# Patient Record
Sex: MALE | Race: BLACK OR AFRICAN AMERICAN | NOT HISPANIC OR LATINO
[De-identification: names, ages, dates, MRNs, and addresses within clinical notes are randomized per-mention and may not be internally consistent; named-entity substitution may affect disease eponyms.]

---

## 2016-11-25 RX ORDER — MYCOPHENOLATE MOFETIL 250 MG/1
1 CAPSULE ORAL
Qty: 42 | Refills: 0 | COMMUNITY
Start: 2016-11-25 | End: 2016-12-09

## 2017-01-02 ENCOUNTER — TRANSCRIPTION ENCOUNTER (OUTPATIENT)
Age: 27
End: 2017-01-02

## 2017-01-03 ENCOUNTER — APPOINTMENT (OUTPATIENT)
Dept: DERMATOLOGY | Facility: CLINIC | Age: 27
End: 2017-01-03

## 2017-01-03 VITALS — SYSTOLIC BLOOD PRESSURE: 120 MMHG | DIASTOLIC BLOOD PRESSURE: 70 MMHG

## 2017-01-03 DIAGNOSIS — L70.0 ACNE VULGARIS: ICD-10-CM

## 2017-01-10 ENCOUNTER — APPOINTMENT (OUTPATIENT)
Dept: ULTRASOUND IMAGING | Facility: HOSPITAL | Age: 27
End: 2017-01-10

## 2017-01-16 ENCOUNTER — FORM ENCOUNTER (OUTPATIENT)
Age: 27
End: 2017-01-16

## 2017-01-16 ENCOUNTER — RESULT REVIEW (OUTPATIENT)
Age: 27
End: 2017-01-16

## 2017-01-17 ENCOUNTER — OUTPATIENT (OUTPATIENT)
Dept: OUTPATIENT SERVICES | Facility: HOSPITAL | Age: 27
LOS: 1 days | End: 2017-01-17
Payer: COMMERCIAL

## 2017-01-17 ENCOUNTER — APPOINTMENT (OUTPATIENT)
Dept: ULTRASOUND IMAGING | Facility: HOSPITAL | Age: 27
End: 2017-01-17

## 2017-01-17 DIAGNOSIS — M32.9 SYSTEMIC LUPUS ERYTHEMATOSUS, UNSPECIFIED: ICD-10-CM

## 2017-01-17 LAB
APTT BLD: 24.9 SEC — LOW (ref 27.5–37.4)
INR BLD: 1.06 RATIO — SIGNIFICANT CHANGE UP (ref 0.88–1.16)
PROTHROM AB SERPL-ACNC: 11.5 SEC — SIGNIFICANT CHANGE UP (ref 10–13.1)

## 2017-01-17 PROCEDURE — 88348 ELECTRON MICROSCOPY DX: CPT

## 2017-01-17 PROCEDURE — 88312 SPECIAL STAINS GROUP 1: CPT

## 2017-01-17 PROCEDURE — 88346 IMFLUOR 1ST 1ANTB STAIN PX: CPT

## 2017-01-17 PROCEDURE — 88313 SPECIAL STAINS GROUP 2: CPT

## 2017-01-17 PROCEDURE — 88305 TISSUE EXAM BY PATHOLOGIST: CPT

## 2017-01-17 PROCEDURE — 85730 THROMBOPLASTIN TIME PARTIAL: CPT

## 2017-01-17 PROCEDURE — 76942 ECHO GUIDE FOR BIOPSY: CPT

## 2017-01-17 PROCEDURE — 85610 PROTHROMBIN TIME: CPT

## 2017-01-17 PROCEDURE — 88350 IMFLUOR EA ADDL 1ANTB STN PX: CPT

## 2017-01-17 PROCEDURE — 50200 RENAL BIOPSY PERQ: CPT

## 2017-01-24 ENCOUNTER — APPOINTMENT (OUTPATIENT)
Dept: RHEUMATOLOGY | Facility: CLINIC | Age: 27
End: 2017-01-24

## 2017-01-24 VITALS
WEIGHT: 154 LBS | SYSTOLIC BLOOD PRESSURE: 127 MMHG | HEIGHT: 68 IN | RESPIRATION RATE: 14 BRPM | OXYGEN SATURATION: 97 % | DIASTOLIC BLOOD PRESSURE: 86 MMHG | BODY MASS INDEX: 23.34 KG/M2 | HEART RATE: 99 BPM

## 2017-01-25 VITALS — SYSTOLIC BLOOD PRESSURE: 110 MMHG | DIASTOLIC BLOOD PRESSURE: 70 MMHG

## 2017-01-25 LAB
ALBUMIN SERPL ELPH-MCNC: 3.1 G/DL
ALP BLD-CCNC: 65 U/L
ALT SERPL-CCNC: 26 U/L
ANION GAP SERPL CALC-SCNC: 12 MMOL/L
APPEARANCE: CLEAR
AST SERPL-CCNC: 20 U/L
BACTERIA: NEGATIVE
BASOPHILS # BLD AUTO: 0.01 K/UL
BASOPHILS NFR BLD AUTO: 0.1 %
BILIRUB SERPL-MCNC: 0.3 MG/DL
BILIRUBIN URINE: NEGATIVE
BLOOD URINE: ABNORMAL
BUN SERPL-MCNC: 15 MG/DL
C3 SERPL-MCNC: 66 MG/DL
C4 SERPL-MCNC: 11 MG/DL
CALCIUM SERPL-MCNC: 8.8 MG/DL
CHLORIDE SERPL-SCNC: 103 MMOL/L
CK SERPL-CCNC: 87 U/L
CO2 SERPL-SCNC: 27 MMOL/L
COLOR: YELLOW
CREAT SERPL-MCNC: 1.11 MG/DL
CREAT SPEC-SCNC: 152 MG/DL
CREAT/PROT UR: 1.2 RATIO
DSDNA AB SER-ACNC: 220 IU/ML
ENA RNP AB SER IA-ACNC: 4.1 AL
ENA SM AB SER IA-ACNC: >8 AL
ENA SS-A AB SER IA-ACNC: 7.3 AL
ENA SS-B AB SER IA-ACNC: <0.2 AL
EOSINOPHIL # BLD AUTO: 0.01 K/UL
EOSINOPHIL NFR BLD AUTO: 0.1 %
GLUCOSE QUALITATIVE U: NORMAL MG/DL
GLUCOSE SERPL-MCNC: 112 MG/DL
HCT VFR BLD CALC: 46.5 %
HGB BLD-MCNC: 14.5 G/DL
HYALINE CASTS: 0 /LPF
IMM GRANULOCYTES NFR BLD AUTO: 0.4 %
INR PPP: 1.98 RATIO
KETONES URINE: NEGATIVE
LEUKOCYTE ESTERASE URINE: NEGATIVE
LYMPHOCYTES # BLD AUTO: 3.04 K/UL
LYMPHOCYTES NFR BLD AUTO: 18.7 %
MAN DIFF?: NORMAL
MCHC RBC-ENTMCNC: 30.3 PG
MCHC RBC-ENTMCNC: 31.2 GM/DL
MCV RBC AUTO: 97.3 FL
MICROSCOPIC-UA: NORMAL
MONOCYTES # BLD AUTO: 0.92 K/UL
MONOCYTES NFR BLD AUTO: 5.7 %
NEUTROPHILS # BLD AUTO: 12.2 K/UL
NEUTROPHILS NFR BLD AUTO: 75 %
NITRITE URINE: NEGATIVE
PH URINE: 6.5
PLATELET # BLD AUTO: 245 K/UL
POTASSIUM SERPL-SCNC: 4.1 MMOL/L
PROT SERPL-MCNC: 5.5 G/DL
PROT UR-MCNC: 186 MG/DL
PROTEIN URINE: 300 MG/DL
PT BLD: 22.5 SEC
RBC # BLD: 4.78 M/UL
RBC # FLD: 14.5 %
RED BLOOD CELLS URINE: 10 /HPF
SODIUM SERPL-SCNC: 142 MMOL/L
SPECIFIC GRAVITY URINE: 1.03
SQUAMOUS EPITHELIAL CELLS: 3 /HPF
UROBILINOGEN URINE: NORMAL MG/DL
WBC # FLD AUTO: 16.25 K/UL
WHITE BLOOD CELLS URINE: 34 /HPF

## 2017-01-26 LAB — SURGICAL PATHOLOGY STUDY: SIGNIFICANT CHANGE UP

## 2017-01-27 ENCOUNTER — APPOINTMENT (OUTPATIENT)
Dept: ELECTROPHYSIOLOGY | Facility: CLINIC | Age: 27
End: 2017-01-27

## 2017-01-27 VITALS
HEART RATE: 83 BPM | BODY MASS INDEX: 24.25 KG/M2 | WEIGHT: 160 LBS | DIASTOLIC BLOOD PRESSURE: 88 MMHG | HEIGHT: 68 IN | SYSTOLIC BLOOD PRESSURE: 134 MMHG | OXYGEN SATURATION: 97 %

## 2017-01-29 ENCOUNTER — MEDICATION RENEWAL (OUTPATIENT)
Age: 27
End: 2017-01-29

## 2017-01-29 LAB
ANA PAT FLD IF-IMP: NORMAL
ANA SER IF-ACNC: ABNORMAL

## 2017-01-31 ENCOUNTER — MEDICATION RENEWAL (OUTPATIENT)
Age: 27
End: 2017-01-31

## 2017-02-06 ENCOUNTER — APPOINTMENT (OUTPATIENT)
Dept: RHEUMATOLOGY | Facility: CLINIC | Age: 27
End: 2017-02-06

## 2017-02-06 ENCOUNTER — APPOINTMENT (OUTPATIENT)
Dept: CV DIAGNOSITCS | Facility: HOSPITAL | Age: 27
End: 2017-02-06

## 2017-02-06 ENCOUNTER — OUTPATIENT (OUTPATIENT)
Dept: OUTPATIENT SERVICES | Facility: HOSPITAL | Age: 27
LOS: 1 days | End: 2017-02-06
Payer: COMMERCIAL

## 2017-02-06 DIAGNOSIS — L95.9 VASCULITIS LIMITED TO THE SKIN, UNSPECIFIED: ICD-10-CM

## 2017-02-06 DIAGNOSIS — I42.9 CARDIOMYOPATHY, UNSPECIFIED: ICD-10-CM

## 2017-02-06 PROCEDURE — 93306 TTE W/DOPPLER COMPLETE: CPT

## 2017-02-06 PROCEDURE — 93306 TTE W/DOPPLER COMPLETE: CPT | Mod: 26

## 2017-02-07 ENCOUNTER — APPOINTMENT (OUTPATIENT)
Dept: RHEUMATOLOGY | Facility: CLINIC | Age: 27
End: 2017-02-07

## 2017-02-07 LAB
BASOPHILS # BLD AUTO: 0.01 K/UL
BASOPHILS NFR BLD AUTO: 0.1 %
CK SERPL-CCNC: 307 U/L
DSDNA AB SER-ACNC: 112 IU/ML
EOSINOPHIL # BLD AUTO: 0 K/UL
EOSINOPHIL NFR BLD AUTO: 0 %
HCT VFR BLD CALC: 45.5 %
HGB BLD-MCNC: 13.7 G/DL
IMM GRANULOCYTES NFR BLD AUTO: 0.6 %
LYMPHOCYTES # BLD AUTO: 1.63 K/UL
LYMPHOCYTES NFR BLD AUTO: 8.4 %
MAN DIFF?: NORMAL
MCHC RBC-ENTMCNC: 29.7 PG
MCHC RBC-ENTMCNC: 30.1 GM/DL
MCV RBC AUTO: 98.5 FL
MONOCYTES # BLD AUTO: 1.08 K/UL
MONOCYTES NFR BLD AUTO: 5.6 %
NEUTROPHILS # BLD AUTO: 16.53 K/UL
NEUTROPHILS NFR BLD AUTO: 85.3 %
PLATELET # BLD AUTO: 269 K/UL
RBC # BLD: 4.62 M/UL
RBC # FLD: 14.3 %
WBC # FLD AUTO: 19.36 K/UL

## 2017-02-08 LAB
APPEARANCE: ABNORMAL
BACTERIA: ABNORMAL
BILIRUBIN URINE: NEGATIVE
BLOOD URINE: ABNORMAL
C3 SERPL-MCNC: 58 MG/DL
C4 SERPL-MCNC: 11 MG/DL
CALCIUM OXALATE CRYSTALS: NEGATIVE
COLOR: YELLOW
GLUCOSE QUALITATIVE U: NORMAL MG/DL
GRANULAR CASTS: 0 /LPF
HYALINE CASTS: 0 /LPF
KETONES URINE: NEGATIVE
LEUKOCYTE ESTERASE URINE: NEGATIVE
MICROSCOPIC-UA: NORMAL
NITRITE URINE: NEGATIVE
PH URINE: 5.5
PROTEIN URINE: 300 MG/DL
RED BLOOD CELLS URINE: 15 /HPF
SPECIFIC GRAVITY URINE: 1.02
SQUAMOUS EPITHELIAL CELLS: 3 /HPF
TRIPLE PHOSPHATE CRYSTALS: NEGATIVE
URIC ACID CRYSTALS: NEGATIVE
UROBILINOGEN URINE: NORMAL MG/DL
WHITE BLOOD CELLS URINE: 10 /HPF

## 2017-03-06 ENCOUNTER — APPOINTMENT (OUTPATIENT)
Dept: RHEUMATOLOGY | Facility: CLINIC | Age: 27
End: 2017-03-06

## 2017-03-06 LAB
ALBUMIN SERPL ELPH-MCNC: 3.3 G/DL
ALP BLD-CCNC: 60 U/L
ALT SERPL-CCNC: 33 U/L
ANION GAP SERPL CALC-SCNC: 18 MMOL/L
AST SERPL-CCNC: 31 U/L
BILIRUB SERPL-MCNC: 0.3 MG/DL
BUN SERPL-MCNC: 21 MG/DL
CALCIUM SERPL-MCNC: 8.7 MG/DL
CHLORIDE SERPL-SCNC: 100 MMOL/L
CO2 SERPL-SCNC: 23 MMOL/L
CREAT SERPL-MCNC: 0.8 MG/DL
CREAT SPEC-SCNC: 122 MG/DL
CREAT/PROT UR: 2.2 RATIO
GLUCOSE SERPL-MCNC: 26 MG/DL
POTASSIUM SERPL-SCNC: 4.2 MMOL/L
PROT SERPL-MCNC: 5.3 G/DL
PROT UR-MCNC: 265 MG/DL
SODIUM SERPL-SCNC: 141 MMOL/L

## 2017-03-20 ENCOUNTER — LABORATORY RESULT (OUTPATIENT)
Age: 27
End: 2017-03-20

## 2017-03-20 ENCOUNTER — APPOINTMENT (OUTPATIENT)
Dept: RHEUMATOLOGY | Facility: CLINIC | Age: 27
End: 2017-03-20

## 2017-03-27 ENCOUNTER — MEDICATION RENEWAL (OUTPATIENT)
Age: 27
End: 2017-03-27

## 2017-03-31 ENCOUNTER — APPOINTMENT (OUTPATIENT)
Dept: CARDIOLOGY | Facility: CLINIC | Age: 27
End: 2017-03-31

## 2017-03-31 VITALS
RESPIRATION RATE: 16 BRPM | HEIGHT: 68 IN | DIASTOLIC BLOOD PRESSURE: 81 MMHG | HEART RATE: 89 BPM | BODY MASS INDEX: 25.61 KG/M2 | SYSTOLIC BLOOD PRESSURE: 130 MMHG | WEIGHT: 169 LBS | OXYGEN SATURATION: 98 %

## 2017-03-31 DIAGNOSIS — Z87.39 PERSONAL HISTORY OF OTHER DISEASES OF THE MUSCULOSKELETAL SYSTEM AND CONNECTIVE TISSUE: ICD-10-CM

## 2017-03-31 DIAGNOSIS — N30.01 ACUTE CYSTITIS WITH HEMATURIA: ICD-10-CM

## 2017-03-31 DIAGNOSIS — I82.409 ACUTE EMBOLISM AND THROMBOSIS OF UNSPECIFIED DEEP VEINS OF UNSPECIFIED LOWER EXTREMITY: ICD-10-CM

## 2017-03-31 DIAGNOSIS — Z86.711 PERSONAL HISTORY OF PULMONARY EMBOLISM: ICD-10-CM

## 2017-03-31 DIAGNOSIS — Z87.898 PERSONAL HISTORY OF OTHER SPECIFIED CONDITIONS: ICD-10-CM

## 2017-03-31 DIAGNOSIS — Z86.718 PERSONAL HISTORY OF OTHER VENOUS THROMBOSIS AND EMBOLISM: ICD-10-CM

## 2017-03-31 DIAGNOSIS — Z86.79 PERSONAL HISTORY OF OTHER DISEASES OF THE CIRCULATORY SYSTEM: ICD-10-CM

## 2017-04-03 ENCOUNTER — APPOINTMENT (OUTPATIENT)
Dept: RHEUMATOLOGY | Facility: CLINIC | Age: 27
End: 2017-04-03

## 2017-04-03 PROBLEM — N30.01 ACUTE CYSTITIS WITH HEMATURIA: Status: RESOLVED | Noted: 2017-03-25 | Resolved: 2017-04-03

## 2017-05-01 ENCOUNTER — APPOINTMENT (OUTPATIENT)
Dept: RHEUMATOLOGY | Facility: CLINIC | Age: 27
End: 2017-05-01

## 2017-05-03 ENCOUNTER — APPOINTMENT (OUTPATIENT)
Dept: CARDIOLOGY | Facility: CLINIC | Age: 27
End: 2017-05-03

## 2017-05-03 VITALS
WEIGHT: 186 LBS | DIASTOLIC BLOOD PRESSURE: 60 MMHG | BODY MASS INDEX: 28.19 KG/M2 | HEIGHT: 68 IN | OXYGEN SATURATION: 99 % | SYSTOLIC BLOOD PRESSURE: 94 MMHG | RESPIRATION RATE: 17 BRPM | HEART RATE: 99 BPM

## 2017-05-03 RX ORDER — CLINDAMYCIN PHOSPHATE 10 MG/ML
1 LOTION TOPICAL TWICE DAILY
Qty: 1 | Refills: 3 | Status: DISCONTINUED | COMMUNITY
Start: 2017-01-03 | End: 2017-05-03

## 2017-05-03 RX ORDER — CIPROFLOXACIN HYDROCHLORIDE 500 MG/1
500 TABLET, FILM COATED ORAL
Qty: 5 | Refills: 1 | Status: DISCONTINUED | COMMUNITY
Start: 2017-03-25 | End: 2017-05-03

## 2017-05-30 ENCOUNTER — APPOINTMENT (OUTPATIENT)
Dept: RHEUMATOLOGY | Facility: CLINIC | Age: 27
End: 2017-05-30

## 2017-05-30 DIAGNOSIS — B35.9 DERMATOPHYTOSIS, UNSPECIFIED: ICD-10-CM

## 2017-06-13 ENCOUNTER — RX RENEWAL (OUTPATIENT)
Age: 27
End: 2017-06-13

## 2017-06-30 ENCOUNTER — APPOINTMENT (OUTPATIENT)
Dept: RHEUMATOLOGY | Facility: CLINIC | Age: 27
End: 2017-06-30

## 2017-06-30 LAB — TSH SERPL-ACNC: 2.02 UIU/ML

## 2017-07-24 ENCOUNTER — APPOINTMENT (OUTPATIENT)
Dept: RHEUMATOLOGY | Facility: CLINIC | Age: 27
End: 2017-07-24

## 2017-08-02 ENCOUNTER — APPOINTMENT (OUTPATIENT)
Dept: CARDIOLOGY | Facility: CLINIC | Age: 27
End: 2017-08-02
Payer: COMMERCIAL

## 2017-08-02 VITALS
SYSTOLIC BLOOD PRESSURE: 120 MMHG | BODY MASS INDEX: 30.01 KG/M2 | HEIGHT: 68 IN | HEART RATE: 81 BPM | OXYGEN SATURATION: 98 % | DIASTOLIC BLOOD PRESSURE: 78 MMHG | RESPIRATION RATE: 18 BRPM | WEIGHT: 198 LBS

## 2017-08-02 PROCEDURE — 93000 ELECTROCARDIOGRAM COMPLETE: CPT

## 2017-08-02 PROCEDURE — 99215 OFFICE O/P EST HI 40 MIN: CPT

## 2017-08-22 ENCOUNTER — MEDICATION RENEWAL (OUTPATIENT)
Age: 27
End: 2017-08-22

## 2017-08-22 ENCOUNTER — APPOINTMENT (OUTPATIENT)
Dept: RHEUMATOLOGY | Facility: CLINIC | Age: 27
End: 2017-08-22

## 2017-09-18 ENCOUNTER — APPOINTMENT (OUTPATIENT)
Dept: RHEUMATOLOGY | Facility: CLINIC | Age: 27
End: 2017-09-18

## 2017-09-21 ENCOUNTER — LABORATORY RESULT (OUTPATIENT)
Age: 27
End: 2017-09-21

## 2017-09-21 ENCOUNTER — APPOINTMENT (OUTPATIENT)
Dept: RHEUMATOLOGY | Facility: CLINIC | Age: 27
End: 2017-09-21

## 2017-10-07 ENCOUNTER — RX RENEWAL (OUTPATIENT)
Age: 27
End: 2017-10-07

## 2017-10-17 ENCOUNTER — APPOINTMENT (OUTPATIENT)
Dept: RHEUMATOLOGY | Facility: CLINIC | Age: 27
End: 2017-10-17

## 2017-11-13 ENCOUNTER — APPOINTMENT (OUTPATIENT)
Dept: RHEUMATOLOGY | Facility: CLINIC | Age: 27
End: 2017-11-13

## 2017-11-17 ENCOUNTER — OTHER (OUTPATIENT)
Age: 27
End: 2017-11-17

## 2017-11-26 ENCOUNTER — INPATIENT (INPATIENT)
Facility: HOSPITAL | Age: 27
LOS: 2 days | Discharge: HOME | DRG: 394 | End: 2017-11-29
Attending: HOSPITALIST | Admitting: EMERGENCY MEDICINE
Payer: COMMERCIAL

## 2017-11-26 VITALS
HEIGHT: 68.5 IN | OXYGEN SATURATION: 95 % | RESPIRATION RATE: 16 BRPM | WEIGHT: 179.9 LBS | TEMPERATURE: 99 F | HEART RATE: 93 BPM | DIASTOLIC BLOOD PRESSURE: 77 MMHG | SYSTOLIC BLOOD PRESSURE: 117 MMHG

## 2017-11-26 DIAGNOSIS — I50.9 HEART FAILURE, UNSPECIFIED: ICD-10-CM

## 2017-11-26 DIAGNOSIS — K52.9 NONINFECTIVE GASTROENTERITIS AND COLITIS, UNSPECIFIED: ICD-10-CM

## 2017-11-26 DIAGNOSIS — R30.0 DYSURIA: ICD-10-CM

## 2017-11-26 DIAGNOSIS — I82.409 ACUTE EMBOLISM AND THROMBOSIS OF UNSPECIFIED DEEP VEINS OF UNSPECIFIED LOWER EXTREMITY: ICD-10-CM

## 2017-11-26 DIAGNOSIS — M32.14 GLOMERULAR DISEASE IN SYSTEMIC LUPUS ERYTHEMATOSUS: ICD-10-CM

## 2017-11-26 DIAGNOSIS — M32.9 SYSTEMIC LUPUS ERYTHEMATOSUS, UNSPECIFIED: ICD-10-CM

## 2017-11-26 DIAGNOSIS — Z29.9 ENCOUNTER FOR PROPHYLACTIC MEASURES, UNSPECIFIED: ICD-10-CM

## 2017-11-26 LAB
ALBUMIN SERPL ELPH-MCNC: 3.9 G/DL — SIGNIFICANT CHANGE UP (ref 3.3–5)
ALP SERPL-CCNC: 71 U/L — SIGNIFICANT CHANGE UP (ref 40–120)
ALT FLD-CCNC: 8 U/L RC — LOW (ref 10–45)
ANION GAP SERPL CALC-SCNC: 14 MMOL/L — SIGNIFICANT CHANGE UP (ref 5–17)
APPEARANCE UR: CLEAR — SIGNIFICANT CHANGE UP
APTT BLD: 35.5 SEC — SIGNIFICANT CHANGE UP (ref 27.5–37.4)
AST SERPL-CCNC: 19 U/L — SIGNIFICANT CHANGE UP (ref 10–40)
BACTERIA # UR AUTO: ABNORMAL /HPF
BASE EXCESS BLDV CALC-SCNC: 1.8 MMOL/L — SIGNIFICANT CHANGE UP (ref -2–2)
BASOPHILS # BLD AUTO: 0.1 K/UL — SIGNIFICANT CHANGE UP (ref 0–0.2)
BASOPHILS NFR BLD AUTO: 0.6 % — SIGNIFICANT CHANGE UP (ref 0–2)
BILIRUB SERPL-MCNC: 0.7 MG/DL — SIGNIFICANT CHANGE UP (ref 0.2–1.2)
BILIRUB UR-MCNC: NEGATIVE — SIGNIFICANT CHANGE UP
BUN SERPL-MCNC: 12 MG/DL — SIGNIFICANT CHANGE UP (ref 7–23)
CA-I SERPL-SCNC: 1.18 MMOL/L — SIGNIFICANT CHANGE UP (ref 1.12–1.3)
CALCIUM SERPL-MCNC: 9.2 MG/DL — SIGNIFICANT CHANGE UP (ref 8.4–10.5)
CHLORIDE BLDV-SCNC: 106 MMOL/L — SIGNIFICANT CHANGE UP (ref 96–108)
CHLORIDE SERPL-SCNC: 102 MMOL/L — SIGNIFICANT CHANGE UP (ref 96–108)
CO2 BLDV-SCNC: 28 MMOL/L — SIGNIFICANT CHANGE UP (ref 22–30)
CO2 SERPL-SCNC: 25 MMOL/L — SIGNIFICANT CHANGE UP (ref 22–31)
COLOR SPEC: YELLOW — SIGNIFICANT CHANGE UP
CREAT SERPL-MCNC: 1.06 MG/DL — SIGNIFICANT CHANGE UP (ref 0.5–1.3)
DIFF PNL FLD: NEGATIVE — SIGNIFICANT CHANGE UP
EOSINOPHIL # BLD AUTO: 0.1 K/UL — SIGNIFICANT CHANGE UP (ref 0–0.5)
EOSINOPHIL NFR BLD AUTO: 0.5 % — SIGNIFICANT CHANGE UP (ref 0–6)
GAS PNL BLDV: 137 MMOL/L — SIGNIFICANT CHANGE UP (ref 136–145)
GAS PNL BLDV: SIGNIFICANT CHANGE UP
GLUCOSE BLDV-MCNC: 89 MG/DL — SIGNIFICANT CHANGE UP (ref 70–99)
GLUCOSE SERPL-MCNC: 93 MG/DL — SIGNIFICANT CHANGE UP (ref 70–99)
GLUCOSE UR QL: NEGATIVE — SIGNIFICANT CHANGE UP
HCO3 BLDV-SCNC: 27 MMOL/L — SIGNIFICANT CHANGE UP (ref 21–29)
HCT VFR BLD CALC: 37.3 % — LOW (ref 39–50)
HCT VFR BLDA CALC: 38 % — LOW (ref 39–50)
HGB BLD CALC-MCNC: 12.5 G/DL — LOW (ref 13–17)
HGB BLD-MCNC: 11.8 G/DL — LOW (ref 13–17)
INR BLD: 1.49 RATIO — HIGH (ref 0.88–1.16)
KETONES UR-MCNC: NEGATIVE — SIGNIFICANT CHANGE UP
LACTATE BLDV-MCNC: 1.6 MMOL/L — SIGNIFICANT CHANGE UP (ref 0.7–2)
LEUKOCYTE ESTERASE UR-ACNC: NEGATIVE — SIGNIFICANT CHANGE UP
LIDOCAIN IGE QN: 31 U/L — SIGNIFICANT CHANGE UP (ref 7–60)
LYMPHOCYTES # BLD AUTO: 2.2 K/UL — SIGNIFICANT CHANGE UP (ref 1–3.3)
LYMPHOCYTES # BLD AUTO: 21.9 % — SIGNIFICANT CHANGE UP (ref 13–44)
MCHC RBC-ENTMCNC: 28.1 PG — SIGNIFICANT CHANGE UP (ref 27–34)
MCHC RBC-ENTMCNC: 31.6 GM/DL — LOW (ref 32–36)
MCV RBC AUTO: 88.9 FL — SIGNIFICANT CHANGE UP (ref 80–100)
MONOCYTES # BLD AUTO: 0.9 K/UL — SIGNIFICANT CHANGE UP (ref 0–0.9)
MONOCYTES NFR BLD AUTO: 8.6 % — SIGNIFICANT CHANGE UP (ref 2–14)
NEUTROPHILS # BLD AUTO: 6.8 K/UL — SIGNIFICANT CHANGE UP (ref 1.8–7.4)
NEUTROPHILS NFR BLD AUTO: 68.4 % — SIGNIFICANT CHANGE UP (ref 43–77)
NITRITE UR-MCNC: NEGATIVE — SIGNIFICANT CHANGE UP
OTHER CELLS CSF MANUAL: 13 ML/DL — LOW (ref 18–22)
PCO2 BLDV: 47 MMHG — SIGNIFICANT CHANGE UP (ref 35–50)
PH BLDV: 7.38 — SIGNIFICANT CHANGE UP (ref 7.35–7.45)
PH UR: 6 — SIGNIFICANT CHANGE UP (ref 5–8)
PLAT MORPH BLD: NORMAL — SIGNIFICANT CHANGE UP
PLATELET # BLD AUTO: 281 K/UL — SIGNIFICANT CHANGE UP (ref 150–400)
PO2 BLDV: 45 MMHG — SIGNIFICANT CHANGE UP (ref 25–45)
POTASSIUM BLDV-SCNC: 4 MMOL/L — SIGNIFICANT CHANGE UP (ref 3.5–5)
POTASSIUM SERPL-MCNC: 3.7 MMOL/L — SIGNIFICANT CHANGE UP (ref 3.5–5.3)
POTASSIUM SERPL-SCNC: 3.7 MMOL/L — SIGNIFICANT CHANGE UP (ref 3.5–5.3)
PROT SERPL-MCNC: 6.9 G/DL — SIGNIFICANT CHANGE UP (ref 6–8.3)
PROT UR-MCNC: 30 MG/DL
PROTHROM AB SERPL-ACNC: 16.4 SEC — HIGH (ref 9.8–12.7)
RBC # BLD: 4.2 M/UL — SIGNIFICANT CHANGE UP (ref 4.2–5.8)
RBC # FLD: 12.4 % — SIGNIFICANT CHANGE UP (ref 10.3–14.5)
RBC BLD AUTO: SIGNIFICANT CHANGE UP
RBC CASTS # UR COMP ASSIST: SIGNIFICANT CHANGE UP /HPF (ref 0–2)
SAO2 % BLDV: 76 % — SIGNIFICANT CHANGE UP (ref 67–88)
SODIUM SERPL-SCNC: 141 MMOL/L — SIGNIFICANT CHANGE UP (ref 135–145)
SP GR SPEC: 1.02 — SIGNIFICANT CHANGE UP (ref 1.01–1.02)
UROBILINOGEN FLD QL: NEGATIVE — SIGNIFICANT CHANGE UP
WBC # BLD: 11.1 K/UL — HIGH (ref 3.8–10.5)
WBC # FLD AUTO: 11.1 K/UL — HIGH (ref 3.8–10.5)
WBC UR QL: SIGNIFICANT CHANGE UP /HPF (ref 0–5)

## 2017-11-26 PROCEDURE — 99285 EMERGENCY DEPT VISIT HI MDM: CPT

## 2017-11-26 PROCEDURE — 74177 CT ABD & PELVIS W/CONTRAST: CPT | Mod: 26

## 2017-11-26 PROCEDURE — 99282 EMERGENCY DEPT VISIT SF MDM: CPT

## 2017-11-26 PROCEDURE — 99223 1ST HOSP IP/OBS HIGH 75: CPT | Mod: GC

## 2017-11-26 PROCEDURE — 71020: CPT | Mod: 26

## 2017-11-26 RX ORDER — CEFEPIME 1 G/1
2000 INJECTION, POWDER, FOR SOLUTION INTRAMUSCULAR; INTRAVENOUS ONCE
Qty: 0 | Refills: 0 | Status: COMPLETED | OUTPATIENT
Start: 2017-11-26 | End: 2017-11-26

## 2017-11-26 RX ORDER — SODIUM CHLORIDE 9 MG/ML
1000 INJECTION INTRAMUSCULAR; INTRAVENOUS; SUBCUTANEOUS ONCE
Qty: 0 | Refills: 0 | Status: COMPLETED | OUTPATIENT
Start: 2017-11-26 | End: 2017-11-26

## 2017-11-26 RX ORDER — LISINOPRIL 2.5 MG/1
10 TABLET ORAL DAILY
Qty: 0 | Refills: 0 | Status: DISCONTINUED | OUTPATIENT
Start: 2017-11-26 | End: 2017-11-29

## 2017-11-26 RX ORDER — ACETAMINOPHEN 500 MG
1000 TABLET ORAL ONCE
Qty: 0 | Refills: 0 | Status: COMPLETED | OUTPATIENT
Start: 2017-11-26 | End: 2017-11-26

## 2017-11-26 RX ORDER — HYDROXYCHLOROQUINE SULFATE 200 MG
200 TABLET ORAL EVERY 12 HOURS
Qty: 0 | Refills: 0 | Status: DISCONTINUED | OUTPATIENT
Start: 2017-11-26 | End: 2017-11-29

## 2017-11-26 RX ORDER — WARFARIN SODIUM 2.5 MG/1
7 TABLET ORAL ONCE
Qty: 0 | Refills: 0 | Status: COMPLETED | OUTPATIENT
Start: 2017-11-26 | End: 2017-11-26

## 2017-11-26 RX ORDER — WARFARIN SODIUM 2.5 MG/1
6 TABLET ORAL ONCE
Qty: 0 | Refills: 0 | Status: DISCONTINUED | OUTPATIENT
Start: 2017-11-26 | End: 2017-11-26

## 2017-11-26 RX ORDER — CARVEDILOL PHOSPHATE 80 MG/1
25 CAPSULE, EXTENDED RELEASE ORAL EVERY 12 HOURS
Qty: 0 | Refills: 0 | Status: DISCONTINUED | OUTPATIENT
Start: 2017-11-26 | End: 2017-11-29

## 2017-11-26 RX ORDER — HEPARIN SODIUM 5000 [USP'U]/ML
5000 INJECTION INTRAVENOUS; SUBCUTANEOUS EVERY 8 HOURS
Qty: 0 | Refills: 0 | Status: DISCONTINUED | OUTPATIENT
Start: 2017-11-26 | End: 2017-11-26

## 2017-11-26 RX ADMIN — SODIUM CHLORIDE 1000 MILLILITER(S): 9 INJECTION INTRAMUSCULAR; INTRAVENOUS; SUBCUTANEOUS at 14:51

## 2017-11-26 RX ADMIN — Medication 1000 MILLIGRAM(S): at 16:38

## 2017-11-26 RX ADMIN — Medication 400 MILLIGRAM(S): at 14:51

## 2017-11-26 RX ADMIN — CEFEPIME 100 MILLIGRAM(S): 1 INJECTION, POWDER, FOR SOLUTION INTRAMUSCULAR; INTRAVENOUS at 19:16

## 2017-11-26 NOTE — H&P ADULT - NSHPSOCIALHISTORY_GEN_ALL_CORE
no tobacco, social etoh, no drugs, live in [place], on disability and prevuious , engaged to girlfriend

## 2017-11-26 NOTE — H&P ADULT - ASSESSMENT
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

## 2017-11-26 NOTE — H&P ADULT - PROBLEM SELECTOR PLAN 2
- will send C3/C4/dsDNA. patient w/o overt signs of typical lupus flare w/ joint swelling/pain however has acute epiploic appendagitis  - patient is noted to be in RCT for belimumab w/ Dr. Mcdonald. Will need day team to inform Rheum during day of admission - will send C3/C4/dsDNA. patient w/o overt signs of typical lupus flare w/ joint swelling/pain however has acute epiploic appendagitis; ?thrombosis  - patient is noted to be in RCT for belimumab w/ Dr. Mcdonald. Will need day team to inform Rheum during day of admission

## 2017-11-26 NOTE — H&P ADULT - PROBLEM SELECTOR PLAN 5
hx of RLE dvt w/ PE  - continue w/ home coumadin dosing  - monitor w/ INR daily c/w hydroxychloroquine home dosing. patient is unsure of home dosing of mycophenolate mofetil and pharmacy could not be reached (med rec indicates possible dosing) and pharmacy closed. Day team to clarify dosing  - check level of mycophenolate

## 2017-11-26 NOTE — H&P ADULT - NSHPLABSRESULTS_GEN_ALL_CORE
Personally reviewed available labs, imaging and ekg     Labs  CBC Full  -  ( 2017 14:45 )  WBC Count : 11.1 K/uL  Hemoglobin : 11.8 g/dL  Hematocrit : 37.3 %  Platelet Count - Automated : 281 K/uL  Mean Cell Volume : 88.9 fl  Mean Cell Hemoglobin : 28.1 pg  Mean Cell Hemoglobin Concentration : 31.6 gm/dL  Auto Neutrophil # : 6.8 K/uL  Auto Lymphocyte # : 2.2 K/uL  Auto Monocyte # : 0.9 K/uL  Auto Eosinophil # : 0.1 K/uL  Auto Basophil # : 0.1 K/uL  Auto Neutrophil % : 68.4 %  Auto Lymphocyte % : 21.9 %  Auto Monocyte % : 8.6 %  Auto Eosinophil % : 0.5 %  Auto Basophil % : 0.6 %        141  |  102  |  12  ----------------------------<  93  3.7   |  25  |  1.06    Ca    9.2      2017 14:45    TPro  6.9  /  Alb  3.9  /  TBili  0.7  /  DBili  x   /  AST  19  /  ALT  8<L>  /  AlkPhos  71      PT/INR - ( 2017 14:45 )   PT: 16.4 sec;   INR: 1.49 ratio         PTT - ( 2017 14:45 )  PTT:35.5 sec  Urinalysis Basic - ( 2017 15:51 )    Color: Yellow / Appearance: Clear / S.023 / pH: x  Gluc: x / Ketone: Negative  / Bili: Negative / Urobili: Negative   Blood: x / Protein: 30 mg/dL / Nitrite: Negative   Leuk Esterase: Negative / RBC: 0-2 /HPF / WBC 3-5 /HPF   Sq Epi: x / Non Sq Epi: x / Bacteria: Few /HPF      CAPILLARY BLOOD GLUCOSE    14:46 - VBG - pH: 7.38  | pCO2: 47    | pO2: 45    | Lactate: 1.6      Imaging  < from: CT Abdomen and Pelvis w/ Oral Cont and w/ IV Cont (17 @ 16:49) >    *** ADDENDUM 2017  ***    The impression should read:    Nonspecific fatty stranding in the left lower quadrant suggestive of   epiploic appendigitis.    < end of copied text >    CXR PA/Lateral: clear lungs Personally reviewed available labs, imaging and ekg     Labs  CBC Full  -  ( 2017 14:45 )  WBC Count : 11.1 K/uL  Hemoglobin : 11.8 g/dL  Hematocrit : 37.3 %  Platelet Count - Automated : 281 K/uL  Mean Cell Volume : 88.9 fl  Mean Cell Hemoglobin : 28.1 pg  Mean Cell Hemoglobin Concentration : 31.6 gm/dL  Auto Neutrophil # : 6.8 K/uL  Auto Lymphocyte # : 2.2 K/uL  Auto Monocyte # : 0.9 K/uL  Auto Eosinophil # : 0.1 K/uL  Auto Basophil # : 0.1 K/uL  Auto Neutrophil % : 68.4 %  Auto Lymphocyte % : 21.9 %  Auto Monocyte % : 8.6 %  Auto Eosinophil % : 0.5 %  Auto Basophil % : 0.6 %        141  |  102  |  12  ----------------------------<  93  3.7   |  25  |  1.06    Ca    9.2      2017 14:45    TPro  6.9  /  Alb  3.9  /  TBili  0.7  /  DBili  x   /  AST  19  /  ALT  8<L>  /  AlkPhos  71      PT/INR - ( 2017 14:45 )   PT: 16.4 sec;   INR: 1.49 ratio         PTT - ( 2017 14:45 )  PTT:35.5 sec  Urinalysis Basic - ( 2017 15:51 )    Color: Yellow / Appearance: Clear / S.023 / pH: x  Gluc: x / Ketone: Negative  / Bili: Negative / Urobili: Negative   Blood: x / Protein: 30 mg/dL / Nitrite: Negative   Leuk Esterase: Negative / RBC: 0-2 /HPF / WBC 3-5 /HPF   Sq Epi: x / Non Sq Epi: x / Bacteria: Few /HPF      CAPILLARY BLOOD GLUCOSE    14:46 - VBG - pH: 7.38  | pCO2: 47    | pO2: 45    | Lactate: 1.6      CT Abdomen and Pelvis w/ Oral Cont and w/ IV Cont reviewed    *** ADDENDUM 2017  ***    The impression should read:    Nonspecific fatty stranding in the left lower quadrant suggestive of   epiploic appendegitis.      CXR PA/Lateral reviewed: clear lungs    Reviewed previous records:  TTE 2017:   mild to moderate global left ventricular systolic dysfunction; EF 40-45%.    EKG 2016: sinus tachycardia

## 2017-11-26 NOTE — ED PROVIDER NOTE - CONSTITUTIONAL, MLM
normal... Well appearing, well nourished, awake, alert, oriented to person, place, time/situation and in no apparent distress. Well appearing, well nourished, awake, alert, oriented to person, place, time/situation and in moderate distress from pain

## 2017-11-26 NOTE — H&P ADULT - PROBLEM SELECTOR PLAN 4
c/w hydroxychloroquine home dosing. patient is unsure of home dosing of mycophenolate mofetil and pharmacy could not be reached (med rec indicates possible dosing) and pharmacy closed. Day team to clarify dosing - UA negative  - no report of hematuria or purulence  - Noted in long term relationship w/ fiance.  - currently symptoms improved as per patient  - no pain on exam. can dos pyridium if continues

## 2017-11-26 NOTE — ED PROVIDER NOTE - OBJECTIVE STATEMENT
28 y/o male hx of lupus complicated by nephritis, myocarditis, dvt and PE in past who presents to the ED for 4 days of abdominal pain. reports mostly localized to llq, febrile to 101.8 at home yesterday. reports was taking tylenol but ran out so has not taken any today. nausea without vomiting or diarrhea. last bm 2 hours ago, normal. on cellcept and plaquenil. no past surgical history

## 2017-11-26 NOTE — ED PROVIDER NOTE - PROGRESS NOTE DETAILS
CT with apparent epiploic appendigitis, otherwise negative workup. given immunocompromise and fever we will admit for monitoring and blood cultures

## 2017-11-26 NOTE — ED ADULT NURSE NOTE - OBJECTIVE STATEMENT
pt presents with left lower abd pain x 4days progressively becoming worse, pt with H/O lupus, pos.nausea, on Coumadin, pt A;Ox3, no acute resp distress noted, v/s stable, abd soft, tender to mid-lower lft pt presents with left lower abd pain x 4days progressively becoming worse, pt with H/O lupus, pos.nausea, on Coumadin, pt A;Ox3, no acute resp distress noted, v/s stable, febrile at home as per patient abd soft, tender to mid-lower lft lower abd pain, described as sharp constant pain, pos. nausea pos urinary frequency, no change in BM, no history of abd sx, pt A;Ox3, no acute resp distress noted, v/s stable, voids freely, ambulates independently without difficulty, MD white, labs sent, will continue to monitor

## 2017-11-26 NOTE — H&P ADULT - PROBLEM SELECTOR PLAN 6
continue w/ coumadin dosing for dvt ppx - UA negative  - no report of hematuria or purulence  - Noted in long term relationship w/ fiance.  - no pain on exam. can dos pyridium if continues hx of RLE dvt w/ PE  - start coumadin 7mg tonight;  - INR subtherapeutic  - monitor w/ INR daily

## 2017-11-26 NOTE — H&P ADULT - HISTORY OF PRESENT ILLNESS
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 Patient reports that 3d ago he developed intermittent sharp, 8/10, nonradiating LLQ pain associated w/ moving or coughing, and alleviated only w/ rest. There has been no reported trauma, no blood per rectum, vomiting, joint pain, joint swelling, rash, or alopecia but the patient does report fever to 101F 2d ago, intermittent burning urination and increased bowel movements however no change in consistency of stool. He has no recent travel or sick contacts. The patient reports that since August he has been involved in RCT w/ placebo/belimumab for his lupus and notes being weaned off steroids since around that time. 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 Patient reports that 3d ago he developed intermittent sharp, 8/10, nonradiating LLQ pain associated w/ moving or coughing, and alleviated only w/ rest. There has been no reported trauma, no blood per rectum, vomiting, joint pain, joint swelling, rash, or alopecia but the patient does report fever to 101F 2d ago, intermittent burning urination(w/o purulence/hematuria) and increased bowel movements however no change in consistency of stool. He has no recent travel or sick contacts. The patient reports that since August he has been involved in RCT w/ placebo/belimumab for his lupus and notes being weaned off steroids since around that time.

## 2017-11-26 NOTE — H&P ADULT - NSHPPHYSICALEXAM_GEN_ALL_CORE
Vital Signs Last 24 Hrs  T(C): 36.7 (26 Nov 2017 19:22), Max: 37.3 (26 Nov 2017 13:47)  T(F): 98 (26 Nov 2017 19:22), Max: 99.1 (26 Nov 2017 13:47)  HR: 87 (26 Nov 2017 19:22) (86 - 93)  BP: 100/58 (26 Nov 2017 19:22) (100/58 - 117/77)  BP(mean): --  RR: 18 (26 Nov 2017 19:22) (16 - 18)  SpO2: 97% (26 Nov 2017 19:22) (95% - 98%)    GENERAL: NAD, well-developed  HEAD:  Atraumatic, Normocephalic  EYES: EOMI, PERRLA, conjunctiva and sclera clear  Mouth: MMM, no lesions  NECK: Supple, no appreciable masses  Lung: normal work of breathing, cta b/l  Chest: S1&S2+, rrr, no m/r/g appreciated  ABDOMEN: bs+, soft, mild LLQ tenderness, nd, no appreciable masses  : No lazo catheter, no CVA tenderness  EXTREMITIES:  Peripheral Pulses Present in all extremities., No clubbing, cyanosis, or edema  Neuro: A&Ox3, no focal deficits  SKIN: warm and dry, no visible rashes, striae on abdomen

## 2017-11-26 NOTE — H&P ADULT - NSHPREVIEWOFSYSTEMS_GEN_ALL_CORE
CONSTITUTIONAL: fever+, no chills  EYES: No eye pain, no visual disturbance  Mouth: no pain in mouth, no cavities  RESPIRATORY: No cough, No sob  CARDIOVASCULAR: No CP, no palpitations  GASTROINTESTINAL: abdominal pain+, no n/v/d  GENITOURINARY: dysuria+, no hematuria  NEUROLOGICAL: No headaches, no weakness  SKIN: No itching, no rashes  MUSCULOSKELETAL: No joint pain, no joint swelling CONSTITUTIONAL: fever+, no chills  EYES: No eye pain, no visual disturbance  Mouth: no pain in mouth, no cavities  RESPIRATORY: No cough, No sob  CARDIOVASCULAR: No CP, no palpitations  GASTROINTESTINAL: abdominal pain+, no n/v/d  GENITOURINARY: dysuria+, no hematuria  NEUROLOGICAL: No headaches, no weakness  SKIN: No itching, no rashes  PSYCH: no depression or anxiety  MUSCULOSKELETAL: No joint pain, no joint swelling

## 2017-11-26 NOTE — CONSULT NOTE ADULT - SUBJECTIVE AND OBJECTIVE BOX
[facility] General Surgery Consultation   Consultation of Dr. SAURABH Guerrero     Patient is a 27y old  Male who presents with a chief complaint of left lower quadrant pain for 4 days. No alleviating factors could be elicited.  He denies any recent trauma. The pain was without chills. He has been passing flatus and having bowel movements. He has tolerated a diet without nausea or vomiting. He endorses a fever at home, 48 hours prior to ED evaluation. He has been afebrile in the ED. He has not had any recent sick exposures and denied having had foreign travel. He has had urinary symptoms of urgency, frequency or dysuria.       MEDICAL & SURGICAL HISTORY: SLE, Myocarditis, DVT  FAMILY HISTORY: (SLE) in mother  SOCIAL HISTORY: NO ETOH/Tobcacco  Home Medications:  Coreg CR 20 mg oral capsule, extended release: 1 cap(s) orally once a day (in the morning) (2017 13:51)  Coumadin 1 mg oral tablet: 6 tab(s) orally once a day (at bedtime) (2017 13:51)  lisinopril 10 mg oral tablet: 1 tab(s) orally once a day (2017 18:50)  Plaquenil 200 mg oral tablet: 1 tab(s) orally 2 times a day (2017 13:51)  Allergies: Vancomycin Hydrochloride (Pruritus; Rash; Hives)    Vital Signs Last 24 Hrs  T(C): 37.1 (2017 14:45), Max: 37.3 (2017 13:47)  HR: 86 (2017 14:45) (86 - 93)  BP: 106/70 (2017 14:45) (106/70 - 117/77)  RR: 18 (2017 14:45) (16 - 18)  SpO2: 98% (2017 14:45) (95% - 98%)  Daily Height in cm: 173.99 (2017 13:47)      Physical Examination  General: WN/WD NAD  Neurology: A&Ox3,   Respiratory: Nonlabored  CV: RRR  Abdominal: Soft, NT, ND no palpable mass  MSK: No edema, + peripheral pulses, FROM all 4 extremity                          11.8   11.1  )-----------( 281      ( 2017 14:45 )             37.3     11-    141  |  102  |  12  ----------------------------<  93  3.7   |  25  |  1.06    Ca    9.2      2017 14:45    TPro  6.9  /  Alb  3.9  /  TBili  0.7  /  DBili  x   /  AST  19  /  ALT  8<L>  /  AlkPhos  71      PT/INR - ( 2017 14:45 )   PT: 16.4 sec;   INR: 1.49 ratio         PTT - ( 2017 14:45 )  PTT:35.5 sec  Urinalysis Basic - ( 2017 15:51 )    Color: Yellow / Appearance: Clear / S.023 / pH: x  Gluc: x / Ketone: Negative  / Bili: Negative / Urobili: Negative   Blood: x / Protein: 30 mg/dL / Nitrite: Negative   Leuk Esterase: Negative / RBC: 0-2 /HPF / WBC 3-5 /HPF   Sq Epi: x / Non Sq Epi: x / Bacteria: Few /HPF        Radiographic Findings:     < from: CT Abdomen and Pelvis w/ Oral Cont and w/ IV Cont (17 @ 16:49) >    EXAM:  CT ABDOMEN AND PELVIS OC IC                            *** ADDENDUM 2017  ***    The impression should read:    Nonspecific fatty stranding in the left lower quadrant suggestive of   epiploic appendigitis.      *** END OF ADDENDUM 2017  ***      PROCEDURE DATE:  2017            INTERPRETATION:  CLINICAL INFORMATION: Left lower quadrant pain.   Abdominal pain. History of lupus.    COMPARISON: CT chest 2016    PROCEDURE:   CT of the Abdomen and Pelvis was performed with intravenous contrast.   Intravenous contrast: 90 ml Omnipaque 350. 10 ml discarded.  Oral contrast: positive contrast was administered.  Sagittal and coronal reformats were performed.    FINDINGS:    LOWER CHEST: Minimal right basilar atelectasis.    LIVER: Within normal limits.  BILE DUCTS: Normal caliber.  GALLBLADDER: Within normal limits.  SPLEEN: Within normal limits.  PANCREAS: Within normal limits.  ADRENALS: Within normal limits.  KIDNEYS/URETERS: Within normal limits.    BLADDER: Within normal limits.  REPRODUCTIVE ORGANS: The prostate is within normal limits.    BOWEL: Nonspecific fatty stranding in the left lower quadrant. The bowel   wall is not thickened. No bowel obstruction. Appendix is normal.  PERITONEUM: No ascites.  VESSELS:  Within normal limits.  RETROPERITONEUM: No lymphadenopathy.    ABDOMINAL WALL: Within normal limits.  BONES: Within normal limits.    IMPRESSION:     Nonspecific fatty stranding in the left lower quadrant suggestive of   epiploic appendicitis..              ***Please see the addendum at the top of this report. It may contain   additional important information or changes.****      ROBERT RODRIGUEZ M.D., RADIOLOGY RESIDENT  This document has been electronically signed.  ROEL DIAZ M.D., ATTENDING RADIOLOGIST  This document has been electronically signed. 2017  5:51PM  Addend:  ROEL DIAZ M.D., ATTENDING RADIOLOGIST  This addendum was electronically signed on: 2017  6:26PM.          < end of copied text >

## 2017-11-26 NOTE — H&P ADULT - PROBLEM SELECTOR PLAN 1
- demonstrated on CT  - CANNOT give NSAID b/c of lupus nephritis. will continue w/ tylenol for pain control. currently 5/10 when at rest. if worsens would use steroids  - surgery consult appreciated and no intervention at this time

## 2017-11-26 NOTE — ED PROVIDER NOTE - ATTENDING CONTRIBUTION TO CARE
ATTENDING MD: I, Ezio Amin, have seen and evaluated this patient with the advance practice clinician.  I have discussed the history, exam ,and aspects of care with the APC.  I have reviewed the APC's note. I agree with the findings  unless other wise stated in the HPI, PE, MDM, and progress notes.

## 2017-11-26 NOTE — ED PROVIDER NOTE - PHYSICAL EXAMINATION
ATTENDING MD Eloisa: well appearing, nontoxic, moderate distress from pain. NCAT, eyes clear and anicteric, extra-occular movements are intact, mucus membranes are moist, oropharynx is within normal limits, neck supple, heart rate regular, S1, S2, no murmurs, rubs, or gallops. lungs clear to auscultation bilaterally, equal breath sounds bilaterally. abd soft, nondistended, LLQ and suprapubic tenderness. skin unremarkable and normal for enthinicty. extremities nontender no swelling. normal mentation, moving all extremities.    PA exam:

## 2017-11-27 DIAGNOSIS — I50.9 HEART FAILURE, UNSPECIFIED: ICD-10-CM

## 2017-11-27 DIAGNOSIS — F32.1 MAJOR DEPRESSIVE DISORDER, SINGLE EPISODE, MODERATE: ICD-10-CM

## 2017-11-27 DIAGNOSIS — R10.32 LEFT LOWER QUADRANT PAIN: ICD-10-CM

## 2017-11-27 DIAGNOSIS — Z86.711 PERSONAL HISTORY OF PULMONARY EMBOLISM: ICD-10-CM

## 2017-11-27 DIAGNOSIS — R19.7 DIARRHEA, UNSPECIFIED: ICD-10-CM

## 2017-11-27 LAB
ANION GAP SERPL CALC-SCNC: 12 MMOL/L — SIGNIFICANT CHANGE UP (ref 5–17)
APTT BLD: 38.6 SEC — HIGH (ref 27.5–37.4)
BASOPHILS # BLD AUTO: 0.02 K/UL — SIGNIFICANT CHANGE UP (ref 0–0.2)
BASOPHILS NFR BLD AUTO: 0.2 % — SIGNIFICANT CHANGE UP (ref 0–2)
BUN SERPL-MCNC: 11 MG/DL — SIGNIFICANT CHANGE UP (ref 7–23)
C3 SERPL-MCNC: 108 MG/DL — SIGNIFICANT CHANGE UP (ref 80–180)
C4 SERPL-MCNC: 21 MG/DL — SIGNIFICANT CHANGE UP (ref 10–45)
CALCIUM SERPL-MCNC: 9.5 MG/DL — SIGNIFICANT CHANGE UP (ref 8.4–10.5)
CHLORIDE SERPL-SCNC: 103 MMOL/L — SIGNIFICANT CHANGE UP (ref 96–108)
CO2 SERPL-SCNC: 24 MMOL/L — SIGNIFICANT CHANGE UP (ref 22–31)
CREAT SERPL-MCNC: 0.93 MG/DL — SIGNIFICANT CHANGE UP (ref 0.5–1.3)
CULTURE RESULTS: NO GROWTH — SIGNIFICANT CHANGE UP
EOSINOPHIL # BLD AUTO: 0.1 K/UL — SIGNIFICANT CHANGE UP (ref 0–0.5)
EOSINOPHIL NFR BLD AUTO: 1.1 % — SIGNIFICANT CHANGE UP (ref 0–6)
GLUCOSE SERPL-MCNC: 70 MG/DL — SIGNIFICANT CHANGE UP (ref 70–99)
HCT VFR BLD CALC: 35.1 % — LOW (ref 39–50)
HGB BLD-MCNC: 11.3 G/DL — LOW (ref 13–17)
IMM GRANULOCYTES NFR BLD AUTO: 0.5 % — SIGNIFICANT CHANGE UP (ref 0–1.5)
INR BLD: 1.59 RATIO — HIGH (ref 0.88–1.16)
LYMPHOCYTES # BLD AUTO: 2.45 K/UL — SIGNIFICANT CHANGE UP (ref 1–3.3)
LYMPHOCYTES # BLD AUTO: 26.3 % — SIGNIFICANT CHANGE UP (ref 13–44)
MAGNESIUM SERPL-MCNC: 2 MG/DL — SIGNIFICANT CHANGE UP (ref 1.6–2.6)
MCHC RBC-ENTMCNC: 27.2 PG — SIGNIFICANT CHANGE UP (ref 27–34)
MCHC RBC-ENTMCNC: 32.2 GM/DL — SIGNIFICANT CHANGE UP (ref 32–36)
MCV RBC AUTO: 84.4 FL — SIGNIFICANT CHANGE UP (ref 80–100)
MONOCYTES # BLD AUTO: 0.83 K/UL — SIGNIFICANT CHANGE UP (ref 0–0.9)
MONOCYTES NFR BLD AUTO: 8.9 % — SIGNIFICANT CHANGE UP (ref 2–14)
NEUTROPHILS # BLD AUTO: 5.88 K/UL — SIGNIFICANT CHANGE UP (ref 1.8–7.4)
NEUTROPHILS NFR BLD AUTO: 63 % — SIGNIFICANT CHANGE UP (ref 43–77)
PHOSPHATE SERPL-MCNC: 3.1 MG/DL — SIGNIFICANT CHANGE UP (ref 2.5–4.5)
PLATELET # BLD AUTO: 312 K/UL — SIGNIFICANT CHANGE UP (ref 150–400)
POTASSIUM SERPL-MCNC: 4.3 MMOL/L — SIGNIFICANT CHANGE UP (ref 3.5–5.3)
POTASSIUM SERPL-SCNC: 4.3 MMOL/L — SIGNIFICANT CHANGE UP (ref 3.5–5.3)
PROTHROM AB SERPL-ACNC: 18.1 SEC — HIGH (ref 10–13.1)
RBC # BLD: 4.16 M/UL — LOW (ref 4.2–5.8)
RBC # FLD: 14.2 % — SIGNIFICANT CHANGE UP (ref 10.3–14.5)
SODIUM SERPL-SCNC: 139 MMOL/L — SIGNIFICANT CHANGE UP (ref 135–145)
SPECIMEN SOURCE: SIGNIFICANT CHANGE UP
WBC # BLD: 9.33 K/UL — SIGNIFICANT CHANGE UP (ref 3.8–10.5)
WBC # FLD AUTO: 9.33 K/UL — SIGNIFICANT CHANGE UP (ref 3.8–10.5)

## 2017-11-27 PROCEDURE — 99233 SBSQ HOSP IP/OBS HIGH 50: CPT | Mod: GC

## 2017-11-27 RX ORDER — MYCOPHENOLATE MOFETIL 250 MG/1
1000 CAPSULE ORAL
Qty: 0 | Refills: 0 | Status: DISCONTINUED | OUTPATIENT
Start: 2017-11-27 | End: 2017-11-29

## 2017-11-27 RX ORDER — HEPARIN SODIUM 5000 [USP'U]/ML
5000 INJECTION INTRAVENOUS; SUBCUTANEOUS EVERY 12 HOURS
Qty: 0 | Refills: 0 | Status: DISCONTINUED | OUTPATIENT
Start: 2017-11-27 | End: 2017-11-28

## 2017-11-27 RX ORDER — TRAMADOL HYDROCHLORIDE 50 MG/1
25 TABLET ORAL EVERY 6 HOURS
Qty: 0 | Refills: 0 | Status: DISCONTINUED | OUTPATIENT
Start: 2017-11-27 | End: 2017-11-28

## 2017-11-27 RX ORDER — ACETAMINOPHEN 500 MG
650 TABLET ORAL EVERY 6 HOURS
Qty: 0 | Refills: 0 | Status: DISCONTINUED | OUTPATIENT
Start: 2017-11-27 | End: 2017-11-28

## 2017-11-27 RX ORDER — HEPARIN SODIUM 5000 [USP'U]/ML
3000 INJECTION INTRAVENOUS; SUBCUTANEOUS EVERY 6 HOURS
Qty: 0 | Refills: 0 | Status: DISCONTINUED | OUTPATIENT
Start: 2017-11-27 | End: 2017-11-27

## 2017-11-27 RX ORDER — HEPARIN SODIUM 5000 [USP'U]/ML
6500 INJECTION INTRAVENOUS; SUBCUTANEOUS EVERY 6 HOURS
Qty: 0 | Refills: 0 | Status: DISCONTINUED | OUTPATIENT
Start: 2017-11-27 | End: 2017-11-27

## 2017-11-27 RX ORDER — MYCOPHENOLATE MOFETIL 250 MG/1
1500 CAPSULE ORAL
Qty: 0 | Refills: 0 | Status: DISCONTINUED | OUTPATIENT
Start: 2017-11-27 | End: 2017-11-29

## 2017-11-27 RX ORDER — WARFARIN SODIUM 2.5 MG/1
7.5 TABLET ORAL ONCE
Qty: 0 | Refills: 0 | Status: COMPLETED | OUTPATIENT
Start: 2017-11-27 | End: 2017-11-27

## 2017-11-27 RX ORDER — HEPARIN SODIUM 5000 [USP'U]/ML
INJECTION INTRAVENOUS; SUBCUTANEOUS
Qty: 25000 | Refills: 0 | Status: DISCONTINUED | OUTPATIENT
Start: 2017-11-27 | End: 2017-11-27

## 2017-11-27 RX ADMIN — TRAMADOL HYDROCHLORIDE 25 MILLIGRAM(S): 50 TABLET ORAL at 21:55

## 2017-11-27 RX ADMIN — MYCOPHENOLATE MOFETIL 1500 MILLIGRAM(S): 250 CAPSULE ORAL at 06:07

## 2017-11-27 RX ADMIN — Medication 200 MILLIGRAM(S): at 06:07

## 2017-11-27 RX ADMIN — Medication 650 MILLIGRAM(S): at 10:30

## 2017-11-27 RX ADMIN — MYCOPHENOLATE MOFETIL 1000 MILLIGRAM(S): 250 CAPSULE ORAL at 21:25

## 2017-11-27 RX ADMIN — TRAMADOL HYDROCHLORIDE 25 MILLIGRAM(S): 50 TABLET ORAL at 21:25

## 2017-11-27 RX ADMIN — LISINOPRIL 10 MILLIGRAM(S): 2.5 TABLET ORAL at 06:07

## 2017-11-27 RX ADMIN — Medication 200 MILLIGRAM(S): at 17:54

## 2017-11-27 RX ADMIN — Medication 650 MILLIGRAM(S): at 10:00

## 2017-11-27 RX ADMIN — WARFARIN SODIUM 7 MILLIGRAM(S): 2.5 TABLET ORAL at 00:24

## 2017-11-27 RX ADMIN — TRAMADOL HYDROCHLORIDE 25 MILLIGRAM(S): 50 TABLET ORAL at 14:49

## 2017-11-27 RX ADMIN — WARFARIN SODIUM 7.5 MILLIGRAM(S): 2.5 TABLET ORAL at 21:26

## 2017-11-27 RX ADMIN — CARVEDILOL PHOSPHATE 25 MILLIGRAM(S): 80 CAPSULE, EXTENDED RELEASE ORAL at 17:58

## 2017-11-27 RX ADMIN — CARVEDILOL PHOSPHATE 25 MILLIGRAM(S): 80 CAPSULE, EXTENDED RELEASE ORAL at 06:07

## 2017-11-27 NOTE — ED ADULT NURSE REASSESSMENT NOTE - COMFORT CARE
ambulated to bathroom
ambulated to bathroom
ambulated to bathroom/plan of care explained/wait time explained
wait time explained/plan of care explained

## 2017-11-27 NOTE — PROGRESS NOTE ADULT - PROBLEM SELECTOR PLAN 3
-Currently follows up with Dr. Jose Wolff (Rheumatology)  -Plaquenil 200 mg BID  -Will continue Cellcept 100 mg am, 1500 mg pm -Currently follows up with Dr. Jose Wolff (Rheumatology)  -Plaquenil 200 mg BID  -Will continue Cellcept 100 mg am, 1500 mg pm  -Ordered C3/C4, DsDNA antibody, nucleated RBC, results pending -Currently follows up with Dr. Jose Wolff (Rheumatology)  -Plaquenil 200 mg BID  -Will continue Cellcept 100 mg am, 1500 mg pm  -Ordered C3/C4, DsDNA antibody, nucleated RBC, results pending  -Will consult rheum to see if adjustments should be made to his regimen in view of possibly GI infection/inflammation. Also, patient is in a clinical trial, so the clinical trial coordinator should be notified.

## 2017-11-27 NOTE — ED ADULT NURSE REASSESSMENT NOTE - NS ED NURSE REASSESS COMMENT FT1
Report given to Holding RN Leann Rodarte. Pt aware of RN re-assignment and that they are still awaiting bed upstairs. Pt AAOx4, NAD, resp nonlabored, resting comfortably in bed. Pt c/o same 0/10 LLQ pain at rest and 6/10 with palpation. Pt denies headache, dizziness, chest pain, palpitations, SOB, n/v/d, fevers, chills, weakness at this time. Vital signs stable. Patient in stable condition. Safety maintained
Pt AAOx4, NAD, resp nonlabored, resting comfortably in bed, pt ambulated independently to bathroom, steady gait noted. Pt reports improvement in LLQ pain to 0/10 at rest and 6/10 "when I lean back or press it." Pt denies headache, dizziness, chest pain, palpitations, SOB, n/v/d, fevers, chills, weakness at this time. Pt taken to CT scan, awaiting CT results. Safety maintained.
Pt AAOx4, NAD, resp nonlabored, resting comfortably in bed, pt ambulates to bathroom independently, steady gait noted. Pt reports same LLQ pain 0/10 at rest and 6/10 with "pressing on it." Pt denies headache, dizziness, chest pain, palpitations, SOB, n/v/d, urinary symptoms, fevers, chills, weakness at this time. Pt awaiting bed. Safety maintained.
Report received from Rosalina CARTER. Pt AAOx4, NAD, resp nonlabored, skin warm/dry, resting comfortably in bed. Pt c/o 8/10 LLQ sharp constant abd pain and urinary frequency. Pt denies headache, dizziness, chest pain, palpitations, SOB, n/v/d, urinary burning/urgency, dysuria, fevers, chills, weakness at this time. Pt awaiting CT and lab results. Safety maintained.

## 2017-11-27 NOTE — PROGRESS NOTE ADULT - PROBLEM SELECTOR PLAN 2
-Finding on CT abdomen, could be explanation for pain however pain appears more localized to L inguinal region  -Blood/urine cultures pending

## 2017-11-27 NOTE — PROGRESS NOTE ADULT - PROBLEM SELECTOR PROBLEM 4
Chronic heart failure, unspecified heart failure type Lupus nephritis Moderate single current episode of major depressive disorder

## 2017-11-27 NOTE — PROGRESS NOTE ADULT - PROBLEM SELECTOR PLAN 1
-Pain control with tylenol -Order urine chlmydia/GC nucleic acid amplification  -Blood/urine cultures pending  -Pt is opiate naive however tylenol not helping with pain, pt cannot take NSAIDS due to lupus nephritis despite Cr of 0.93 currently.  Consider giving tramadol for pain.

## 2017-11-27 NOTE — PROGRESS NOTE ADULT - PROBLEM SELECTOR PLAN 6
-Goal INR 2-3  -INR currently 1.59  -Pt takes 6 mg warfarin at home  -Will increase to 7 mg warfarin until pt becomes therapeutic  -Advise pt to avoid foods high in vitamin K (leafy greens) -History of DVT/PE unprovoked, diagnosed as possible lupus anti-coagulant and put on lifelong anti-coagulation.   -Goal INR 2-3  -INR currently 1.59  -Pt takes 6 mg warfarin at home  -Will increase to 7.5 mg warfarin until pt becomes therapeutic  -Advise pt to avoid foods high in vitamin K (leafy greens)  -Will not bridge with heparin, since patient's INR is often up and down and he usually doesn't require any bridging. Also, patient prefers not to take a heparin drip at this time.

## 2017-11-27 NOTE — PROGRESS NOTE ADULT - ASSESSMENT
Pt is a 28 YO M w/ SLE w/ anticardiolipin ab c/b myocarditis, pericarditis in 2007 on Plaquenil and Cellcept,, lupus nephritis, and history of R DVT/PE on coumadin p/w acute onset LLQ abdominal pain with no fever and CT abdomen significant for nonspecific fatty straining in LLQ suggestive of epiploic appendagitis. Pt is a 26 YO M w/ SLE w/ anticardiolipin ab c/b myocarditis, pericarditis in 2007 on Plaquenil and Cellcept,, lupus nephritis, and history of R DVT/PE on coumadin p/w acute onset LLQ abdominal pain with recorded home temp 101, physical exam findings of L inguinal pain with involuntary guarding, and CT abdomen significant only for nonspecific fatty straining in LLQ suggestive of epiploic appendagitis, however physical exam findings indicate involvement of inguinal region, however CT abdomen did not suggest inguinal hernia or concern for abscess.

## 2017-11-27 NOTE — PROGRESS NOTE ADULT - SUBJECTIVE AND OBJECTIVE BOX
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  No associated fevers, appetite changes, vomiting, nausea, diarrhea, constipation, dysuria, associated with pain.  Pain started 4 days ago, has interfered with regular activities such as being able to walk dog due to pain with walking.  Reports that he used to take ibuprofen for pain but cannot any longer due to his lupus nephritis.  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  Additionally, pt was hospitalized x2 in 2016 for fever.  Pt reports he had been on a high dose of prednisone in the past for "10 year" but was slowly weaned off prednisone and is currently not taking this medication.      Pt additionally endorsing depressed/anxious mood.  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  Pt not interested in medication at this time however is seeking a therapist.    In ED, pt was afebrile (99.1) and hemodynamically stable (HR 93, /77) pt was given 1L IV bolus and IV tylenol on  evening.  INR subtherapeutic (1.59).  BMP WNL (Cr 0.93),  CT abdomen () remarkable for nonspecific fatty straining in LLQ suggestive of epiploic appendigitis.   Blood, urine cultures, C3/C4, DsDNA, and nucleated RBC ordered, results pending.      MEDICATIONS  (STANDING):  carvedilol 25 milliGRAM(s) Oral every 12 hours  hydroxychloroquine 200 milliGRAM(s) Oral every 12 hours  lisinopril 10 milliGRAM(s) Oral daily  mycophenolate mofetil 1500 milliGRAM(s) Oral <User Schedule>  mycophenolate mofetil 1000 milliGRAM(s) Oral <User Schedule>    MEDICATIONS  (PRN):        CAPILLARY BLOOD GLUCOSE        I&O's Summary      PHYSICAL EXAM:  GENERAL: NAD, well-developed  HEAD:  Atraumatic, Normocephalic  EYES: EOMI, PERRLA, conjunctiva and sclera clear  NECK: Supple, No JVD  CHEST/LUNG: Clear to auscultation bilaterally; No wheeze  HEART: Regular rate and rhythm; No murmurs, rubs, or gallops  ABDOMEN: LLQ tenderness with involuntary guarding upon deep palpation, Nondistended; Bowel sounds present  EXTREMITIES:  2+ Peripheral Pulses, No clubbing, cyanosis, or edema  PSYCH: Appears depressed, endorsing depressed mood, anhedonia, increased feelings of guilt, decreased energy, and sleep difficulties, denies SI/HI.  Judgment Fair/ Insight fair; A&O x3  NEUROLOGY: CN II -XII intact, 2/4 achilles reflexes b/l, normal gait, 5/5 strength upper/lower extremities b/l  SKIN: No rashes or lesions    LABS:                        11.3   9.33  )-----------( 312      ( 2017 07:18 )             35.1     WBC Trend: 9.33<--, 11.1<--  1127    139  |  103  |  11  ----------------------------<  70  4.3   |  24  |  0.93    Ca    9.5      2017 07:23  Phos  3.1       Mg     2.0         TPro  6.9  /  Alb  3.9  /  TBili  0.7  /  DBili  x   /  AST  19  /  ALT  8<L>  /  AlkPhos  71      Creatinine Trend: 0.93<--, 1.06<--  PT/INR - ( 2017 07:18 )   PT: 18.1 sec;   INR: 1.59 ratio         PTT - ( 2017 07:18 )  PTT:38.6 sec      Urinalysis Basic - ( 2017 15:51 )    Color: Yellow / Appearance: Clear / S.023 / pH: x  Gluc: x / Ketone: Negative  / Bili: Negative / Urobili: Negative   Blood: x / Protein: 30 mg/dL / Nitrite: Negative   Leuk Esterase: Negative / RBC: 0-2 /HPF / WBC 3-5 /HPF   Sq Epi: x / Non Sq Epi: x / Bacteria: Few /HPF          RADIOLOGY & ADDITIONAL TESTS:    Imaging Personally Reviewed: < from: CT Abdomen and Pelvis w/ Oral Cont and w/ IV Cont (17 @ 16:49) >  FINDINGS:    LOWER CHEST: Minimal right basilar atelectasis.    LIVER: Within normal limits.  BILE DUCTS: Normal caliber.  GALLBLADDER: Within normal limits.  SPLEEN: Within normal limits.  PANCREAS: Within normal limits.  ADRENALS: Within normal limits.  KIDNEYS/URETERS: Within normal limits.    BLADDER: Within normal limits.  REPRODUCTIVE ORGANS: The prostate is within normal limits.    BOWEL: Nonspecific fatty stranding in the left lower quadrant. The bowel   wall is not thickened. No bowel obstruction. Appendix is normal.  PERITONEUM: No ascites.  VESSELS:  Within normal limits.  RETROPERITONEUM: No lymphadenopathy.    ABDOMINAL WALL: Within normal limits.  BONES: Within normal limits.    IMPRESSION:     Nonspecific fatty stranding in the left lower quadrant suggestive of   epiploic appendicitis..    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  Recorded fever at home of 101, denies appetite changes, vomiting, nausea, diarrhea, constipation, dysuria, associated with pain.  Pain started 4 days ago, has interfered with regular activities such as being able to walk dog due to pain with walking.  Reports that he used to take ibuprofen for pain but cannot any longer due to his lupus nephritis.  Of note, pt appears to be opiate naive as he has not been prescribed narcotics at [facility] based on chart review and I-STOP.  Additionally, pt was hospitalized x2 in 2016 for fever.  Pt reports he had been on a high dose of prednisone in the past for "10 year" but was slowly weaned off prednisone and is currently not taking this medication.      Pt additionally endorsing depressed/anxious mood.  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  Pt not interested in medication at this time however is seeking a therapist.    In ED, pt was afebrile (99.1) and hemodynamically stable (HR 93, /77) pt was given 1L IV bolus and IV tylenol on  evening.  INR subtherapeutic (1.59).  BMP WNL (Cr 0.93),  CT abdomen () remarkable for nonspecific fatty straining in LLQ suggestive of epiploic appendagitis.   Blood, urine cultures, C3/C4, DsDNA, and nucleated RBC ordered, results pending.      MEDICATIONS  (STANDING):  carvedilol 25 milliGRAM(s) Oral every 12 hours  hydroxychloroquine 200 milliGRAM(s) Oral every 12 hours  lisinopril 10 milliGRAM(s) Oral daily  mycophenolate mofetil 1500 milliGRAM(s) Oral <User Schedule>  mycophenolate mofetil 1000 milliGRAM(s) Oral <User Schedule>    MEDICATIONS  (PRN):        CAPILLARY BLOOD GLUCOSE        I&O's Summary      PHYSICAL EXAM:  GENERAL: NAD, well-developed  HEAD:  Atraumatic, Normocephalic  EYES: EOMI, PERRLA, conjunctiva and sclera clear  NECK: Supple, No JVD  CHEST/LUNG: Clear to auscultation bilaterally; No wheeze  HEART: Regular rate and rhythm; No murmurs, rubs, or gallops  ABDOMEN: LLQ tenderness with involuntary guarding upon deep palpation, Nondistended; Bowel sounds present  EXTREMITIES:  2+ Peripheral Pulses, No clubbing, cyanosis, or edema  PSYCH: Appears depressed, endorsing depressed mood, anhedonia, increased feelings of guilt, decreased energy, and sleep difficulties, denies SI/HI.  Judgment Fair/ Insight fair; A&O x3  NEUROLOGY: CN II -XII intact, 2/4 achilles reflexes b/l, normal gait, 5/5 strength upper/lower extremities b/l  SKIN: Vertical scab-like lesions over sides of abdomen consistent w/ chronic prednisone use    LABS:                        11.3   9.33  )-----------( 312      ( 2017 07:18 )             35.1     WBC Trend: 9.33<--, 11.1<--  11-27    139  |  103  |  11  ----------------------------<  70  4.3   |  24  |  0.93    Ca    9.5      2017 07:23  Phos  3.1       Mg     2.0         TPro  6.9  /  Alb  3.9  /  TBili  0.7  /  DBili  x   /  AST  19  /  ALT  8<L>  /  AlkPhos  71      Creatinine Trend: 0.93<--, 1.06<--  PT/INR - ( 2017 07:18 )   PT: 18.1 sec;   INR: 1.59 ratio         PTT - ( 2017 07:18 )  PTT:38.6 sec      Urinalysis Basic - ( 2017 15:51 )    Color: Yellow / Appearance: Clear / S.023 / pH: x  Gluc: x / Ketone: Negative  / Bili: Negative / Urobili: Negative   Blood: x / Protein: 30 mg/dL / Nitrite: Negative   Leuk Esterase: Negative / RBC: 0-2 /HPF / WBC 3-5 /HPF   Sq Epi: x / Non Sq Epi: x / Bacteria: Few /HPF          RADIOLOGY & ADDITIONAL TESTS:    Imaging Personally Reviewed: < from: CT Abdomen and Pelvis w/ Oral Cont and w/ IV Cont (17 @ 16:49) >  FINDINGS:    LOWER CHEST: Minimal right basilar atelectasis.    LIVER: Within normal limits.  BILE DUCTS: Normal caliber.  GALLBLADDER: Within normal limits.  SPLEEN: Within normal limits.  PANCREAS: Within normal limits.  ADRENALS: Within normal limits.  KIDNEYS/URETERS: Within normal limits.    BLADDER: Within normal limits.  REPRODUCTIVE ORGANS: The prostate is within normal limits.    BOWEL: Nonspecific fatty stranding in the left lower quadrant. The bowel   wall is not thickened. No bowel obstruction. Appendix is normal.  PERITONEUM: No ascites.  VESSELS:  Within normal limits.  RETROPERITONEUM: No lymphadenopathy.    ABDOMINAL WALL: Within normal limits.  BONES: Within normal limits.    IMPRESSION:     Nonspecific fatty stranding in the left lower quadrant suggestive of   epiploic appendicitis..    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  Recorded fever at home of 101, denies appetite changes, vomiting, nausea, diarrhea, constipation, dysuria, associated with pain.  Pain started 4 days ago, has interfered with regular activities such as being able to walk dog due to pain with walking.  Reports that he used to take ibuprofen for pain but cannot any longer due to his lupus nephritis.  Of note, pt appears to be opiate naive as he has not been prescribed narcotics at [facility] based on chart review and I-STOP.  Additionally, pt was hospitalized x2 in 2016 for fever.  Pt reports he had been on a high dose of prednisone in the past for "10 year" but was slowly weaned off prednisone and is currently not taking this medication.      Pt additionally endorsing depressed/anxious mood.  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  Pt not interested in medication at this time however is seeking a therapist.    Pt reports he lives with his girlfriend however he has not been sexually active for past 2 years, however indicated that he does engage in oral sex regularly.  Pt has no concerns for STI.      In ED, pt was afebrile (99.1) and hemodynamically stable (HR 93, /77) pt was given 1L IV bolus and IV tylenol on  evening.  INR subtherapeutic (1.59).  BMP WNL (Cr 0.93),  CT abdomen () remarkable for nonspecific fatty straining in LLQ suggestive of epiploic appendagitis.   Blood, urine cultures, C3/C4, DsDNA, and nucleated RBC ordered, results pending.      MEDICATIONS  (STANDING):  carvedilol 25 milliGRAM(s) Oral every 12 hours  hydroxychloroquine 200 milliGRAM(s) Oral every 12 hours  lisinopril 10 milliGRAM(s) Oral daily  mycophenolate mofetil 1500 milliGRAM(s) Oral <User Schedule>  mycophenolate mofetil 1000 milliGRAM(s) Oral <User Schedule>    MEDICATIONS  (PRN):        CAPILLARY BLOOD GLUCOSE        I&O's Summary      PHYSICAL EXAM:  GENERAL: NAD, well-developed  HEAD:  Atraumatic, Normocephalic  EYES: EOMI, PERRLA, conjunctiva and sclera clear  NECK: Supple, No JVD  CHEST/LUNG: Clear to auscultation bilaterally; No wheeze  HEART: Regular rate and rhythm; No murmurs, rubs, or gallops  ABDOMEN: LLQ tenderness with involuntary guarding in L inguinal region upon deep palpation, Nondistended; Bowel sounds present  MSK: No flank tenderness  EXTREMITIES:  2+ Peripheral Pulses, No clubbing, cyanosis, or edema  PSYCH: Appears depressed, endorsing depressed mood, anhedonia, increased feelings of guilt, decreased energy, and sleep difficulties, denies SI/HI.  Judgment Fair/ Insight fair; A&O x3  NEUROLOGY: CN II -XII intact, 2/4 achilles reflexes b/l, normal gait, 5/5 strength upper/lower extremities b/l  SKIN: Vertical scab-like lesions over sides of abdomen consistent w/ chronic prednisone use    LABS:                        11.3   9.33  )-----------( 312      ( 2017 07:18 )             35.1     WBC Trend: 9.33<--, 11.1<--      139  |  103  |  11  ----------------------------<  70  4.3   |  24  |  0.93    Ca    9.5      2017 07:23  Phos  3.1       Mg     2.0         TPro  6.9  /  Alb  3.9  /  TBili  0.7  /  DBili  x   /  AST  19  /  ALT  8<L>  /  AlkPhos  71      Creatinine Trend: 0.93<--, 1.06<--  PT/INR - ( 2017 07:18 )   PT: 18.1 sec;   INR: 1.59 ratio         PTT - ( 2017 07:18 )  PTT:38.6 sec      Urinalysis Basic - ( 2017 15:51 )    Color: Yellow / Appearance: Clear / S.023 / pH: x  Gluc: x / Ketone: Negative  / Bili: Negative / Urobili: Negative   Blood: x / Protein: 30 mg/dL / Nitrite: Negative   Leuk Esterase: Negative / RBC: 0-2 /HPF / WBC 3-5 /HPF   Sq Epi: x / Non Sq Epi: x / Bacteria: Few /HPF          RADIOLOGY & ADDITIONAL TESTS:    Imaging Personally Reviewed: < from: CT Abdomen and Pelvis w/ Oral Cont and w/ IV Cont (17 @ 16:49) >  FINDINGS:    LOWER CHEST: Minimal right basilar atelectasis.    LIVER: Within normal limits.  BILE DUCTS: Normal caliber.  GALLBLADDER: Within normal limits.  SPLEEN: Within normal limits.  PANCREAS: Within normal limits.  ADRENALS: Within normal limits.  KIDNEYS/URETERS: Within normal limits.    BLADDER: Within normal limits.  REPRODUCTIVE ORGANS: The prostate is within normal limits.    BOWEL: Nonspecific fatty stranding in the left lower quadrant. The bowel   wall is not thickened. No bowel obstruction. Appendix is normal.  PERITONEUM: No ascites.  VESSELS:  Within normal limits.  RETROPERITONEUM: No lymphadenopathy.    ABDOMINAL WALL: Within normal limits.  BONES: Within normal limits.    IMPRESSION:     Nonspecific fatty stranding in the left lower quadrant suggestive of   epiploic appendicitis..    < end of copied text >

## 2017-11-27 NOTE — ED ADULT NURSE REASSESSMENT NOTE - GENERAL PATIENT STATE
no change observed/resting/sleeping/cooperative/comfortable appearance
smiling/interactive/comfortable appearance/cooperative/improvement verbalized
smiling/interactive/resting/sleeping/comfortable appearance/cooperative/no change observed
resting/sleeping/no change observed/cooperative/comfortable appearance

## 2017-11-27 NOTE — PROGRESS NOTE ADULT - PROBLEM SELECTOR PLAN 8
-DVT prophylaxis with warfarin    Dispo: Discharge home after adequate pain control documented    Matthew Barroso  PGY-1  [phone] -DVT prophylaxis with warfarin, but since subtherapeutic, will add heparin SQ.     Dispo: Discharge home after adequate pain control documented    Matthew Barroso  PGY-1  [phone]

## 2017-11-27 NOTE — ED ADULT NURSE REASSESSMENT NOTE - RESPONSE TO
response to care/treatments:

## 2017-11-27 NOTE — PROGRESS NOTE ADULT - PROBLEM SELECTOR PLAN 7
-Pt asymptomatic currently  -Takes carvedilol "40 mg" daily at home, may be incorrectly reported  -Currently on carvedilol 25 mg BID

## 2017-11-27 NOTE — PROGRESS NOTE ADULT - ATTENDING COMMENTS
-Patient seen and discussed on 11/27/17. -Patient seen and discussed on 11/27/17.  -LLQ pain persists. Will try tramadol 25mg Q6H as needed for pain. Rheum consult. Stool studies for diarrhea. Continue coumadin dosing for DVT/PE history.   -DC planning soon.

## 2017-11-28 LAB
ALBUMIN SERPL ELPH-MCNC: 3.6 G/DL — SIGNIFICANT CHANGE UP (ref 3.3–5)
ALP SERPL-CCNC: 66 U/L — SIGNIFICANT CHANGE UP (ref 40–120)
ALT FLD-CCNC: 7 U/L RC — LOW (ref 10–45)
ANION GAP SERPL CALC-SCNC: 12 MMOL/L — SIGNIFICANT CHANGE UP (ref 5–17)
APTT BLD: 43.2 SEC — HIGH (ref 27.5–37.4)
AST SERPL-CCNC: 18 U/L — SIGNIFICANT CHANGE UP (ref 10–40)
BILIRUB SERPL-MCNC: 0.4 MG/DL — SIGNIFICANT CHANGE UP (ref 0.2–1.2)
BUN SERPL-MCNC: 10 MG/DL — SIGNIFICANT CHANGE UP (ref 7–23)
C TRACH RRNA SPEC QL NAA+PROBE: SIGNIFICANT CHANGE UP
CALCIUM SERPL-MCNC: 8.9 MG/DL — SIGNIFICANT CHANGE UP (ref 8.4–10.5)
CHLORIDE SERPL-SCNC: 102 MMOL/L — SIGNIFICANT CHANGE UP (ref 96–108)
CO2 SERPL-SCNC: 25 MMOL/L — SIGNIFICANT CHANGE UP (ref 22–31)
CREAT SERPL-MCNC: 0.96 MG/DL — SIGNIFICANT CHANGE UP (ref 0.5–1.3)
DSDNA AB SER-ACNC: 90 IU/ML — HIGH
GLUCOSE SERPL-MCNC: 74 MG/DL — SIGNIFICANT CHANGE UP (ref 70–99)
HCT VFR BLD CALC: 37.9 % — LOW (ref 39–50)
HGB BLD-MCNC: 12 G/DL — LOW (ref 13–17)
INR BLD: 2.15 RATIO — HIGH (ref 0.88–1.16)
MAGNESIUM SERPL-MCNC: 2 MG/DL — SIGNIFICANT CHANGE UP (ref 1.6–2.6)
MCHC RBC-ENTMCNC: 28 PG — SIGNIFICANT CHANGE UP (ref 27–34)
MCHC RBC-ENTMCNC: 31.7 GM/DL — LOW (ref 32–36)
MCV RBC AUTO: 88.2 FL — SIGNIFICANT CHANGE UP (ref 80–100)
N GONORRHOEA RRNA SPEC QL NAA+PROBE: SIGNIFICANT CHANGE UP
PHOSPHATE SERPL-MCNC: 3.5 MG/DL — SIGNIFICANT CHANGE UP (ref 2.5–4.5)
PLATELET # BLD AUTO: 296 K/UL — SIGNIFICANT CHANGE UP (ref 150–400)
POTASSIUM SERPL-MCNC: 3.6 MMOL/L — SIGNIFICANT CHANGE UP (ref 3.5–5.3)
POTASSIUM SERPL-SCNC: 3.6 MMOL/L — SIGNIFICANT CHANGE UP (ref 3.5–5.3)
PROT SERPL-MCNC: 6.6 G/DL — SIGNIFICANT CHANGE UP (ref 6–8.3)
PROTHROM AB SERPL-ACNC: 23.6 SEC — HIGH (ref 9.8–12.7)
RBC # BLD: 4.3 M/UL — SIGNIFICANT CHANGE UP (ref 4.2–5.8)
RBC # FLD: 12.5 % — SIGNIFICANT CHANGE UP (ref 10.3–14.5)
SODIUM SERPL-SCNC: 139 MMOL/L — SIGNIFICANT CHANGE UP (ref 135–145)
SPECIMEN SOURCE: SIGNIFICANT CHANGE UP
WBC # BLD: 8.9 K/UL — SIGNIFICANT CHANGE UP (ref 3.8–10.5)
WBC # FLD AUTO: 8.9 K/UL — SIGNIFICANT CHANGE UP (ref 3.8–10.5)

## 2017-11-28 PROCEDURE — 99233 SBSQ HOSP IP/OBS HIGH 50: CPT | Mod: GC

## 2017-11-28 PROCEDURE — 99223 1ST HOSP IP/OBS HIGH 75: CPT | Mod: GC

## 2017-11-28 RX ORDER — OXYCODONE AND ACETAMINOPHEN 5; 325 MG/1; MG/1
1 TABLET ORAL EVERY 6 HOURS
Qty: 0 | Refills: 0 | Status: DISCONTINUED | OUTPATIENT
Start: 2017-11-28 | End: 2017-11-29

## 2017-11-28 RX ORDER — OXYCODONE AND ACETAMINOPHEN 5; 325 MG/1; MG/1
1 TABLET ORAL ONCE
Qty: 0 | Refills: 0 | Status: DISCONTINUED | OUTPATIENT
Start: 2017-11-28 | End: 2017-11-28

## 2017-11-28 RX ORDER — TRAMADOL HYDROCHLORIDE 50 MG/1
50 TABLET ORAL EVERY 6 HOURS
Qty: 0 | Refills: 0 | Status: DISCONTINUED | OUTPATIENT
Start: 2017-11-28 | End: 2017-11-28

## 2017-11-28 RX ORDER — WARFARIN SODIUM 2.5 MG/1
6 TABLET ORAL ONCE
Qty: 0 | Refills: 0 | Status: COMPLETED | OUTPATIENT
Start: 2017-11-28 | End: 2017-11-28

## 2017-11-28 RX ORDER — POTASSIUM CHLORIDE 20 MEQ
20 PACKET (EA) ORAL
Qty: 0 | Refills: 0 | Status: COMPLETED | OUTPATIENT
Start: 2017-11-28 | End: 2017-11-28

## 2017-11-28 RX ADMIN — CARVEDILOL PHOSPHATE 25 MILLIGRAM(S): 80 CAPSULE, EXTENDED RELEASE ORAL at 17:10

## 2017-11-28 RX ADMIN — TRAMADOL HYDROCHLORIDE 25 MILLIGRAM(S): 50 TABLET ORAL at 07:04

## 2017-11-28 RX ADMIN — CARVEDILOL PHOSPHATE 25 MILLIGRAM(S): 80 CAPSULE, EXTENDED RELEASE ORAL at 06:34

## 2017-11-28 RX ADMIN — WARFARIN SODIUM 6 MILLIGRAM(S): 2.5 TABLET ORAL at 22:28

## 2017-11-28 RX ADMIN — TRAMADOL HYDROCHLORIDE 50 MILLIGRAM(S): 50 TABLET ORAL at 13:30

## 2017-11-28 RX ADMIN — Medication 20 MILLIEQUIVALENT(S): at 12:52

## 2017-11-28 RX ADMIN — MYCOPHENOLATE MOFETIL 1500 MILLIGRAM(S): 250 CAPSULE ORAL at 06:34

## 2017-11-28 RX ADMIN — TRAMADOL HYDROCHLORIDE 25 MILLIGRAM(S): 50 TABLET ORAL at 06:34

## 2017-11-28 RX ADMIN — OXYCODONE AND ACETAMINOPHEN 1 TABLET(S): 5; 325 TABLET ORAL at 17:08

## 2017-11-28 RX ADMIN — Medication 200 MILLIGRAM(S): at 17:11

## 2017-11-28 RX ADMIN — LISINOPRIL 10 MILLIGRAM(S): 2.5 TABLET ORAL at 06:34

## 2017-11-28 RX ADMIN — MYCOPHENOLATE MOFETIL 1000 MILLIGRAM(S): 250 CAPSULE ORAL at 22:28

## 2017-11-28 RX ADMIN — Medication 200 MILLIGRAM(S): at 06:34

## 2017-11-28 RX ADMIN — Medication 20 MILLIEQUIVALENT(S): at 10:48

## 2017-11-28 RX ADMIN — TRAMADOL HYDROCHLORIDE 50 MILLIGRAM(S): 50 TABLET ORAL at 12:53

## 2017-11-28 NOTE — CONSULT NOTE ADULT - ATTENDING COMMENTS
Patient seen and examined by me personally with fellow.  Agree with above assessment and plans.  No evidence SLE activity, not likely Rx related either  continue conservative tx and pain rx    Patient is aware and understands our recommendations.  d/w him and fellow at length his ongoing depression, fears of exposure to sun/triggering of SLE flare  Advised he seek counseling w/ a psychologist on d/c   pt agrees    can d/c home
Patient seen and examined.  Chart reviewed.  Resident note confirmed.  Pt is a Patient is a 27y old  Male with a medical history signficant for SLE, Myocarditis, DVT who presents to [facility] with abdominal pain.  Pt reports the pain began 4 days ago and is localized to ronald left lower quadrant pain. There are no assocaited factors.  There are no aggravating or alleviating factors.  He denies any recent trauma. The pain was without chills. He has been passing flatus and having bowel movements. He has tolerated a diet without nausea or vomiting. He has not had any recent sick exposures and denied having had foreign travel. A CT of the abdomen reveals epiploic appendagitis.  Pt is unable to take NSAIDS due to renal disease.  Multimodal medical therapy for pain is suggested.  Mgmt per medical team.  Reconsult as necessary.

## 2017-11-28 NOTE — PROGRESS NOTE ADULT - PROBLEM SELECTOR PLAN 9
-Pt had x2 stools yesterday that were loose/runny, however no stools since, does not meet true definition of diarrhea however as pt is immunocompromised stool studies (ova/parasites, stool culture, CDiff) pending
-Several episodes of watery diarrhea. ?Related to the LLQ epiploic appendagitis.   -Stool studies.   -?Related to immunosuppression.

## 2017-11-28 NOTE — PROGRESS NOTE ADULT - PROBLEM SELECTOR PLAN 1
-Order urine chlmydia/GC nucleic acid amplification  -Blood/urine cultures pending  -Pt is opiate naive however tylenol not helping with pain, pt cannot take NSAIDS due to lupus nephritis despite Cr of 0.93 currently.  Consider giving tramadol for pain. -Order urine chlmydia/GC nucleic acid amplification  -Blood/urine cultures pending  -Pt is opiate naive however tylenol not helping with pain, pt cannot take NSAIDS due to lupus nephritis  -Tramadol 25 mg PO Q6H PRN not sufficient, will increase to 50 mg Q6H PRN -Urine chlamydia/GC nucleic acid amplification negative.   -Blood/urine cultures no growth to date.   -Patient denies any genital rash or inguinal swelling or recent risky sexual exposures to suggest STD related inguinal pain.   -Pt is opiate naive however tylenol not helping with pain, pt cannot take NSAIDS due to lupus nephritis  -Tramadol 25 mg PO Q6H PRN not sufficient, will increase to 50 mg Q6H PRN

## 2017-11-28 NOTE — PROGRESS NOTE ADULT - ASSESSMENT
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  INR now therapeutic.  Of note, chart review from [employer] reveals that pt does not have a history of positive anticardiolipin antibody, however had an unprovoked DVT/PE in 2007/2008 that was significant enough to warrant indefinite anticoagulation.

## 2017-11-28 NOTE — PROGRESS NOTE ADULT - SUBJECTIVE AND OBJECTIVE BOX
Patient is a 27y old  Male who presents with a chief complaint of 27M w/ 3d of LLQ pain (2017 20:26)    SUBJECTIVE / OVERNIGHT EVENTS:  Pt still in pain however is ambulating appropriately.  Did not sleep well due to noise involving other patient in room.  Denies fever, chills, chest pain, nausea, vomiting, dysuria.  Pt refused heparin overnight, reported that generally when he is in the hospital and subtherapeutic, INR increases following day after increase of coumadin dose.  Pt stating that he plans to contact his  regarding work however is anxious about this potential discussion.  Pt still not interested in starting antidepressant medication.      MEDICATIONS  (STANDING):  carvedilol 25 milliGRAM(s) Oral every 12 hours  heparin  Injectable 5000 Unit(s) SubCutaneous every 12 hours  hydroxychloroquine 200 milliGRAM(s) Oral every 12 hours  lisinopril 10 milliGRAM(s) Oral daily  mycophenolate mofetil 1500 milliGRAM(s) Oral <User Schedule>  mycophenolate mofetil 1000 milliGRAM(s) Oral <User Schedule>    MEDICATIONS  (PRN):  acetaminophen   Tablet. 650 milliGRAM(s) Oral every 6 hours PRN Moderate Pain (4 - 6) or fever T >100.4  traMADol 50 milliGRAM(s) Oral every 6 hours PRN Severe Pain (7 - 10)      Vital Signs Last 24 Hrs  T(C): 36.7 (2017 06:27), Max: 36.8 (2017 21:27)  T(F): 98.1 (2017 06:27), Max: 98.3 (2017 21:27)  HR: 78 (2017 06:27) (77 - 89)  BP: 111/68 (2017 06:27) (97/64 - 111/68)  BP(mean): --  RR: 17 (2017 06:27) (17 - 148)  SpO2: 100% (2017 06:27) (98% - 100%)    CAPILLARY BLOOD GLUCOSE      I&O's Summary    2017 07:01  -  2017 07:00  --------------------------------------------------------  IN: 1440 mL / OUT: 550 mL / NET: 890 mL        PHYSICAL EXAM:  GENERAL: NAD, well-developed  HEAD:  Atraumatic, Normocephalic  EYES: EOMI, PERRLA, conjunctiva and sclera clear  CHEST/LUNG: Clear to auscultation bilaterally; No wheeze  HEART: Regular rate and rhythm; No murmurs, rubs, or gallops  ABDOMEN: LLQ tenderness with involuntary guarding in L inguinal region upon deep palpation, Nondistended; Bowel sounds present  EXTREMITIES:  2+ Peripheral Pulses, No clubbing, cyanosis, or edema  PSYCH: Euthymic appearance today. Denies SI/HI.  Judgment Fair/ Insight fair; A&O x3  SKIN: Vertical scab-like lesions over sides of abdomen consistent w/ chronic prednisone use    LABS:    DSDNA- 90  GC/Chlamydia Urine not detected  C3 108  C4 21                        12.0   8.9   )-----------( 296      ( 2017 07:47 )             37.9     WBC Trend: 8.9<--, 9.33<--, 11.1<--  1128    139  |  102  |  10  ----------------------------<  74  3.6   |  25  |  0.96    Ca    8.9      2017 07:47  Phos  3.5       Mg     2.0         TPro  6.6  /  Alb  3.6  /  TBili  0.4  /  DBili  x   /  AST  18  /  ALT  7<L>  /  AlkPhos  66      Creatinine Trend: 0.96<--, 0.93<--, 1.06<--  PT/INR - ( 2017 07:47 )   PT: 23.6 sec;   INR: 2.15 ratio         PTT - ( 2017 07:47 )  PTT:43.2 sec      Urinalysis Basic - ( 2017 15:51 )    Color: Yellow / Appearance: Clear / S.023 / pH: x  Gluc: x / Ketone: Negative  / Bili: Negative / Urobili: Negative   Blood: x / Protein: 30 mg/dL / Nitrite: Negative   Leuk Esterase: Negative / RBC: 0-2 /HPF / WBC 3-5 /HPF   Sq Epi: x / Non Sq Epi: x / Bacteria: Few /HPF      RADIOLOGY & ADDITIONAL TESTS:    Imaging Personally Reviewed:< from: CT Abdomen and Pelvis w/ Oral Cont and w/ IV Cont (17 @ 16:49) >  BLADDER: Within normal limits.  REPRODUCTIVE ORGANS: The prostate is within normal limits.    BOWEL: Nonspecific fatty stranding in the left lower quadrant. The bowel   wall is not thickened. No bowel obstruction. Appendix is normal.  PERITONEUM: No ascites.  VESSELS:  Within normal limits.  RETROPERITONEUM: No lymphadenopathy.    ABDOMINAL WALL: Within normal limits.  BONES: Within normal limits.    IMPRESSION:     Nonspecific fatty stranding in the left lower quadrant suggestive of   epiploic appendicitis..      < end of copied text >      Consultant(s) Notes Reviewed:      Care Discussed with Consultants/Other Providers: Patient is a 27y old  Male who presents with a chief complaint of 27M w/ 3d of LLQ pain (2017 20:26)    SUBJECTIVE / OVERNIGHT EVENTS:  Pt still in pain however is ambulating appropriately.  Did not sleep well due to noise involving other patient in room.  Denies fever, chills, chest pain, nausea, vomiting, dysuria.  Pt refused heparin overnight, reported that generally when he is in the hospital and subtherapeutic, INR increases following day after increase of coumadin dose.  Pt stating that he plans to contact his  regarding work however is anxious about this potential discussion.  Pt still not interested in starting antidepressant medication.      MEDICATIONS  (STANDING):  carvedilol 25 milliGRAM(s) Oral every 12 hours  heparin  Injectable 5000 Unit(s) SubCutaneous every 12 hours  hydroxychloroquine 200 milliGRAM(s) Oral every 12 hours  lisinopril 10 milliGRAM(s) Oral daily  mycophenolate mofetil 1500 milliGRAM(s) Oral <User Schedule>  mycophenolate mofetil 1000 milliGRAM(s) Oral <User Schedule>    MEDICATIONS  (PRN):  acetaminophen   Tablet. 650 milliGRAM(s) Oral every 6 hours PRN Moderate Pain (4 - 6) or fever T >100.4  traMADol 50 milliGRAM(s) Oral every 6 hours PRN Severe Pain (7 - 10)      Vital Signs Last 24 Hrs  T(C): 36.7 (2017 06:27), Max: 36.8 (2017 21:27)  T(F): 98.1 (2017 06:27), Max: 98.3 (2017 21:27)  HR: 78 (2017 06:27) (77 - 89)  BP: 111/68 (2017 06:27) (97/64 - 111/68)  BP(mean): --  RR: 17 (2017 06:27) (17 - 148)  SpO2: 100% (2017 06:27) (98% - 100%)    CAPILLARY BLOOD GLUCOSE      I&O's Summary    2017 07:01  -  2017 07:00  --------------------------------------------------------  IN: 1440 mL / OUT: 550 mL / NET: 890 mL        PHYSICAL EXAM:  GENERAL: NAD, well-developed  HEAD:  Atraumatic, Normocephalic  EYES: EOMI, PERRLA, conjunctiva and sclera clear  CHEST/LUNG: Clear to auscultation bilaterally; No wheeze  HEART: Regular rate and rhythm; No murmurs, rubs, or gallops  ABDOMEN: LLQ tenderness with involuntary guarding in L inguinal region upon deep palpation, Nondistended; Bowel sounds present  EXTREMITIES:  2+ Peripheral Pulses, No clubbing, cyanosis, or edema  PSYCH: Euthymic appearance today. Denies SI/HI.  Judgment Fair/ Insight fair; A&O x3  SKIN: Vertical scab-like lesions over sides of abdomen consistent w/ chronic prednisone use    LABS:    DSDNA- 90  GC/Chlamydia Urine not detected  C3 108  C4 21                        12.0   8.9   )-----------( 296      ( 2017 07:47 )             37.9     WBC Trend: 8.9<--, 9.33<--, 11.1<--  1128    139  |  102  |  10  ----------------------------<  74  3.6   |  25  |  0.96    Ca    8.9      2017 07:47  Phos  3.5       Mg     2.0         TPro  6.6  /  Alb  3.6  /  TBili  0.4  /  DBili  x   /  AST  18  /  ALT  7<L>  /  AlkPhos  66      Creatinine Trend: 0.96<--, 0.93<--, 1.06<--  PT/INR - ( 2017 07:47 )   PT: 23.6 sec;   INR: 2.15 ratio         PTT - ( 2017 07:47 )  PTT:43.2 sec      Urinalysis Basic - ( 2017 15:51 )    Color: Yellow / Appearance: Clear / S.023 / pH: x  Gluc: x / Ketone: Negative  / Bili: Negative / Urobili: Negative   Blood: x / Protein: 30 mg/dL / Nitrite: Negative   Leuk Esterase: Negative / RBC: 0-2 /HPF / WBC 3-5 /HPF   Sq Epi: x / Non Sq Epi: x / Bacteria: Few /HPF      RADIOLOGY & ADDITIONAL TESTS:    Imaging Personally Reviewed:< from: CT Abdomen and Pelvis w/ Oral Cont and w/ IV Cont (17 @ 16:49) >  BLADDER: Within normal limits.  REPRODUCTIVE ORGANS: The prostate is within normal limits.    BOWEL: Nonspecific fatty stranding in the left lower quadrant. The bowel   wall is not thickened. No bowel obstruction. Appendix is normal.  PERITONEUM: No ascites.  VESSELS:  Within normal limits.  RETROPERITONEUM: No lymphadenopathy.    ABDOMINAL WALL: Within normal limits.  BONES: Within normal limits.    IMPRESSION:     Nonspecific fatty stranding in the left lower quadrant suggestive of   epiploic appendicitis..      < end of copied text >      Consultant(s) Notes Reviewed: Rheumatology.      Care Discussed with Consultants/Other Providers:

## 2017-11-28 NOTE — CONSULT NOTE ADULT - SUBJECTIVE AND OBJECTIVE BOX
TODD DONALD  [id number]    HISTORY OF PRESENT ILLNESS:  Pt is a 27yoM hx of SLE (dx ) w/ Lupus Nephritis Class IV/V currently in Belimumab study followed by Dr. Mcdonald p/w LLQ abdominal pain. CT A/P suggestive of epiploic appendigitis. Rheum consulted for further eval.     Pt states that LLQ pain started acutely - now a/w N/V/C/D, fevers or chills. He does not recall eating anything out of the ordinary.   Denies pain elsewhere. Denies CP/SOB/headache/blurry vision/rashes/joint pains.   Has been following in clinic regularly for Belimumab study- states that his steroids have been tapered down and his SLE has been under control.     PAST MEDICAL & SURGICAL HISTORY:  Systemic lupus  Myocarditis  DVT (deep venous thrombosis)  Lupus  No significant past surgical history      Review of Systems:  Gen:  No fevers/chills, weight loss  HEENT: No blurry vision  CVS: No chest pain/palpitations  Resp: No SOB/wheezing  GI: No N/V/C/D  +LLQ/inguinal pain  : No hematuria/dysuria  MSK: No joint pains  Skin: No new rashes  Neuro: No headaches    MEDICATIONS  (STANDING):  carvedilol 25 milliGRAM(s) Oral every 12 hours  hydroxychloroquine 200 milliGRAM(s) Oral every 12 hours  lisinopril 10 milliGRAM(s) Oral daily  mycophenolate mofetil 1500 milliGRAM(s) Oral <User Schedule>  mycophenolate mofetil 1000 milliGRAM(s) Oral <User Schedule>  warfarin 6 milliGRAM(s) Oral once    MEDICATIONS  (PRN):  oxyCODONE    5 mG/acetaminophen 325 mG 1 Tablet(s) Oral once PRN Severe Pain (7 - 10)      Allergies    Vancomycin Hydrochloride (Pruritus; Rash; Hives)    Intolerances        PERTINENT MEDICATION HISTORY:    SOCIAL HISTORY: No toxic habits, social alcohol use. Currently lives at home with his girlfrind  OCCUPATION: Unemployed    FAMILY HISTORY:  Family history of systemic lupus erythematosus (SLE) in mother (Mother)      Vital Signs Last 24 Hrs  T(C): 36.9 (2017 13:34), Max: 36.9 (2017 13:34)  T(F): 98.5 (2017 13:34), Max: 98.5 (2017 13:34)  HR: 83 (2017 13:34) (77 - 86)  BP: 102/67 (2017 13:34) (100/62 - 111/68)  BP(mean): --  RR: 18 (2017 13:34) (17 - 18)  SpO2: 95% (2017 13:34) (95% - 100%)    Daily     Daily     Physical Exam:  General: No apparent distress  HEENT: EOMI, MMM  CVS: +S1/S2, RRR, no murmurs/rubs/gallops  Resp: CTA b/l. No crackles/wheezing  GI: soft, +LLQ tenderness +L inguinal tenderness, no masses appreciated  MSK:  Shoulders: wnl  Elbows: wnl  Wrists: wnl  MCPs: wnl  PIPs: wnl  DIPs: wnl   Hips: wnl  Knees: wnl   Ankle: wnl  Neuro: AAOx3  Skin: no visible rashes    LABS:                        12.0   8.9   )-----------( 296      ( 2017 07:47 )             37.9         139  |  102  |  10  ----------------------------<  74  3.6   |  25  |  0.96    Ca    8.9      2017 07:47  Phos  3.5       Mg     2.0         TPro  6.6  /  Alb  3.6  /  TBili  0.4  /  DBili  x   /  AST  18  /  ALT  7<L>  /  AlkPhos  66  -28    PT/INR - ( 2017 07:47 )   PT: 23.6 sec;   INR: 2.15 ratio         PTT - ( 2017 07:47 )  PTT:43.2 sec  Urinalysis Basic - ( 2017 15:51 )    Color: Yellow / Appearance: Clear / S.023 / pH: x  Gluc: x / Ketone: Negative  / Bili: Negative / Urobili: Negative   Blood: x / Protein: 30 mg/dL / Nitrite: Negative   Leuk Esterase: Negative / RBC: 0-2 /HPF / WBC 3-5 /HPF   Sq Epi: x / Non Sq Epi: x / Bacteria: Few /HPF        RADIOLOGY & ADDITIONAL STUDIES:  < from: CT Abdomen and Pelvis w/ Oral Cont and w/ IV Cont (11..17 @ 16:49) >  Nonspecific fatty stranding in the left lower quadrant suggestive of   epiploic appendigitis.    < end of copied text >

## 2017-11-28 NOTE — PROGRESS NOTE ADULT - PROBLEM SELECTOR PLAN 2
-Finding on CT abdomen, could be explanation for pain however pain appears more localized to L inguinal region  -Blood/urine cultures pending -Finding on CT abdomen, could be explanation for pain however pain appears more localized to L inguinal region  -Blood/urine cultures no growth to date.

## 2017-11-28 NOTE — PROGRESS NOTE ADULT - ATTENDING COMMENTS
-Patient required increase in tramadol this morning to 50mg Q6H as needed for pain. By the afternoon his pain was a little better. Will give trial of percocet this evening to see if this provides better pain relief.   -Patient denies dysuria, hematuria, urinary frequency, diarrhea, or vomiting. Loose stools from yesterday resolved. Unlikely urinary/renal source. No findings on CT abdomen to suggest that as well. Likely the pain is caused from the epiploic appendagitis.    -Of note, patient reports history of hip issues due to his lupus. Possibility of referred pain from his left hip to his left inguinal area. He denies any recent trauma or heavy lifting/straining, however he works in a  and is moving/carrying things as part of his job. The pain is worse with movement. Will review CT pelvis with radiology and see if further imaging of the hips may be warranted.   -Rheum recs appreciated. They are ok with DC home, without any adjustments to his medication regimen or further rheum work up. Will DC patient home tomorrow if pain better controlled and otherwise remains stable.   -Coumadin 6mg (home dose) tonight now that INR therapeutic. -Patient required increase in tramadol this morning to 50mg Q6H as needed for pain. By the afternoon his pain was a little better. Will give trial of percocet this evening to see if this provides better pain relief.   -Patient denies dysuria, hematuria, urinary frequency, diarrhea, or vomiting. Loose stools from yesterday resolved. Unlikely urinary/renal source. No findings on CT abdomen to suggest that as well. Likely the pain is caused from the epiploic appendagitis.    -Of note, patient reports history of hip issues due to his lupus. Possibility of referred pain from his left hip to his left inguinal area. He denies any recent trauma or heavy lifting/straining, however he works in a  and is moving/carrying things as part of his job. The pain is worse with movement. Will review CT pelvis with radiology, get left hip/pelvis X-ray, and see if further imaging of the hips may be warranted.   -Rheum recs appreciated. They are ok with DC home, without any adjustments to his medication regimen or further rheum work up. Will DC patient home tomorrow if pain better controlled and otherwise remains stable.   -Coumadin 6mg (home dose) tonight now that INR therapeutic. -Patient required increase in tramadol this morning to 50mg Q6H as needed for pain. By the afternoon his pain was a little better. Will give trial of percocet this evening to see if this provides better pain relief.   -Patient denies dysuria, hematuria, urinary frequency, diarrhea, or vomiting. Loose stools from yesterday resolved. Unlikely urinary/renal source. No findings on CT abdomen to suggest that as well. Likely the pain is caused from the epiploic appendagitis.    -Of note, patient reports history of hip issues due to his lupus. Possibility of referred pain from his left hip to his left inguinal area. He denies any recent trauma or heavy lifting/straining, however he works in a  and is moving/carrying things as part of his job. The pain is worse with movement. He also has history of chronic steroid use which makes him at risk for AVN and osteoporosis. Will review CT pelvis with radiology, get left hip/pelvis X-ray, and see if further imaging of the hips may be warranted.   -Rheum recs appreciated. They are ok with DC home, without any adjustments to his medication regimen or further rheum work up. Will DC patient home tomorrow if pain better controlled and otherwise remains stable.   -Coumadin 6mg (home dose) tonight now that INR therapeutic.

## 2017-11-28 NOTE — PROVIDER CONTACT NOTE (OTHER) - ASSESSMENT
Pt a/ox4, vss, no s/s of distress, refusing heparin. Pt currently taking coumadin, ambulating regularly.

## 2017-11-28 NOTE — CONSULT NOTE ADULT - ASSESSMENT
27yoM hx of SLE (dx 2011) w/ Lupus Nephritis Class IV/V currently in Belimumab study followed by Dr. Mcdonald p/w LLQ abdominal pain. CT A/P suggestive of epiploic appendigitis. Rheum consulted to evaluate.     1) Epliploic Appendigitis- likely cause of pt's LLQ/inguinal pain.  However, can consider other etiologies for pain including  as pt's pain appears to be more focused in the L inguinal region.   His symptoms currently are likely not related to SLE flare  - c/w pain control    2) SLE/LN- currently in Belimumab study.    * final recs pending d/w attending 27yoM hx of SLE (dx 2011) w/ Lupus Nephritis Class IV/V currently in Belimumab study followed by Dr. Mcdonald p/w LLQ abdominal pain. CT A/P suggestive of epiploic appendigitis. Rheum consulted to evaluate.     1) Epliploic Appendigitis- likely cause of pt's LLQ/inguinal pain.  However, can consider other etiologies for pain including  as pt's pain appears to be more focused in the L inguinal region.   His symptoms currently are likely not related to SLE flare  - c/w pain control    2) SLE/LN- currently in Belimumab study.  - c/w Cellcept as home dose  - c/w Plaquenil    3) Depression symptoms- pt reports feeling very down and depressed lately and wanted input on next steps  - Advised pt to seek help via therapist on dc and if no success, then to see psychiatry for further evaluation    No contraindication for d/c home from rheumatological perspective  Please recall if further questions

## 2017-11-28 NOTE — PROVIDER CONTACT NOTE (OTHER) - ACTION/TREATMENT ORDERED:
MD Alba made aware, no new interventions at this time, pt educated on purpose and importance of heparin, pt will continue to ambulate regularly, will continue to monitor.

## 2017-11-28 NOTE — PROGRESS NOTE ADULT - PROBLEM SELECTOR PLAN 8
-DVT prophylaxis with warfarin, but since subtherapeutic, will add heparin SQ.     Dispo: Discharge home after adequate pain control documented    Matthew Barroso  PGY-1  [phone] -DVT prophylaxis with warfarin.    Dispo: Discharge home after adequate pain control documented    Matthew Barroso  PGY-1  [phone]

## 2017-11-28 NOTE — PROGRESS NOTE ADULT - PROBLEM SELECTOR PLAN 6
-History of DVT/PE unprovoked, diagnosed as possible lupus anti-coagulant and put on lifelong anti-coagulation.   -Goal INR 2-3  -INR currently 1.59  -Pt takes 6 mg warfarin at home  -Will increase to 7.5 mg warfarin until pt becomes therapeutic  -Advise pt to avoid foods high in vitamin K (leafy greens)  -Will not bridge with heparin, since patient's INR is often up and down and he usually doesn't require any bridging. Also, patient prefers not to take a heparin drip at this time. -History of DVT/PE unprovoked, diagnosed as possible lupus anti-coagulant and put on lifelong anti-coagulation.   -Will send Beta-2 glycoprotein Ab in am for Lupus AC work up.   -Goal INR 2-3  -INR currently 2.15  -Pt takes 6 mg warfarin at home  -Will resume home dose of warfarin now that patient's INR is therapeutic  -Advise pt to avoid foods high in vitamin K (leafy greens) -History of DVT/PE unprovoked, diagnosed as possible lupus anti-coagulant and put on lifelong anti-coagulation.   -Beta-2 glycoprotein Ab and anti-cardiolipin Ab were both negative in 12/2016 (reviewed Dr. Mcdonald's outpatient records).   -Goal INR 2-3  -INR currently 2.15  -Pt takes 6 mg warfarin at home  -Will resume home dose of warfarin now that patient's INR is therapeutic  -Advise pt to avoid foods high in vitamin K (leafy greens)

## 2017-11-28 NOTE — PROGRESS NOTE ADULT - PROBLEM SELECTOR PLAN 3
-Currently follows up with Dr. Jose Wolff (Rheumatology)  -Plaquenil 200 mg BID  -Will continue Cellcept 100 mg am, 1500 mg pm  -Ordered C3/C4, DsDNA antibody, nucleated RBC, results pending  -Rheumatology notified that pt is in hospital, will follow up today regarding consult for potential GI infection/inflammation. -Currently follows up with Dr. Jose Wolff (Rheumatology)  -Plaquenil 200 mg BID  -Will continue Cellcept 100 mg am, 1500 mg pm  -C3/C4 normal, DsDNA antibody elevated, nucleated RBC cancelled.   -Rheumatology notified that pt is in hospital, will follow up today regarding consult for potential GI infection/inflammation and if any further work up or modifications in his immunosuppression are necessary.   -Will inform clinical trial  of event/hospitalization.

## 2017-11-29 ENCOUNTER — TRANSCRIPTION ENCOUNTER (OUTPATIENT)
Age: 27
End: 2017-11-29

## 2017-11-29 VITALS
OXYGEN SATURATION: 99 % | TEMPERATURE: 99 F | SYSTOLIC BLOOD PRESSURE: 95 MMHG | HEART RATE: 83 BPM | DIASTOLIC BLOOD PRESSURE: 66 MMHG | RESPIRATION RATE: 18 BRPM

## 2017-11-29 LAB
ANION GAP SERPL CALC-SCNC: 13 MMOL/L — SIGNIFICANT CHANGE UP (ref 5–17)
BUN SERPL-MCNC: 11 MG/DL — SIGNIFICANT CHANGE UP (ref 7–23)
CALCIUM SERPL-MCNC: 9.3 MG/DL — SIGNIFICANT CHANGE UP (ref 8.4–10.5)
CHLORIDE SERPL-SCNC: 101 MMOL/L — SIGNIFICANT CHANGE UP (ref 96–108)
CO2 SERPL-SCNC: 24 MMOL/L — SIGNIFICANT CHANGE UP (ref 22–31)
CREAT SERPL-MCNC: 0.89 MG/DL — SIGNIFICANT CHANGE UP (ref 0.5–1.3)
GLUCOSE SERPL-MCNC: 83 MG/DL — SIGNIFICANT CHANGE UP (ref 70–99)
HCT VFR BLD CALC: 40 % — SIGNIFICANT CHANGE UP (ref 39–50)
HGB BLD-MCNC: 12.2 G/DL — LOW (ref 13–17)
INR BLD: 2.55 RATIO — HIGH (ref 0.88–1.16)
MCHC RBC-ENTMCNC: 26.9 PG — LOW (ref 27–34)
MCHC RBC-ENTMCNC: 30.5 GM/DL — LOW (ref 32–36)
MCV RBC AUTO: 88.2 FL — SIGNIFICANT CHANGE UP (ref 80–100)
PLATELET # BLD AUTO: 308 K/UL — SIGNIFICANT CHANGE UP (ref 150–400)
POTASSIUM SERPL-MCNC: 3.8 MMOL/L — SIGNIFICANT CHANGE UP (ref 3.5–5.3)
POTASSIUM SERPL-SCNC: 3.8 MMOL/L — SIGNIFICANT CHANGE UP (ref 3.5–5.3)
PROTHROM AB SERPL-ACNC: 28.3 SEC — HIGH (ref 9.8–12.7)
RBC # BLD: 4.54 M/UL — SIGNIFICANT CHANGE UP (ref 4.2–5.8)
RBC # FLD: 12.3 % — SIGNIFICANT CHANGE UP (ref 10.3–14.5)
SODIUM SERPL-SCNC: 138 MMOL/L — SIGNIFICANT CHANGE UP (ref 135–145)
WBC # BLD: 8.2 K/UL — SIGNIFICANT CHANGE UP (ref 3.8–10.5)
WBC # FLD AUTO: 8.2 K/UL — SIGNIFICANT CHANGE UP (ref 3.8–10.5)

## 2017-11-29 PROCEDURE — 87040 BLOOD CULTURE FOR BACTERIA: CPT

## 2017-11-29 PROCEDURE — 82330 ASSAY OF CALCIUM: CPT

## 2017-11-29 PROCEDURE — 83690 ASSAY OF LIPASE: CPT

## 2017-11-29 PROCEDURE — 82947 ASSAY GLUCOSE BLOOD QUANT: CPT

## 2017-11-29 PROCEDURE — 82565 ASSAY OF CREATININE: CPT

## 2017-11-29 PROCEDURE — 80053 COMPREHEN METABOLIC PANEL: CPT

## 2017-11-29 PROCEDURE — 80048 BASIC METABOLIC PNL TOTAL CA: CPT

## 2017-11-29 PROCEDURE — 82435 ASSAY OF BLOOD CHLORIDE: CPT

## 2017-11-29 PROCEDURE — 74177 CT ABD & PELVIS W/CONTRAST: CPT

## 2017-11-29 PROCEDURE — 84132 ASSAY OF SERUM POTASSIUM: CPT

## 2017-11-29 PROCEDURE — 87086 URINE CULTURE/COLONY COUNT: CPT

## 2017-11-29 PROCEDURE — 86160 COMPLEMENT ANTIGEN: CPT

## 2017-11-29 PROCEDURE — 96374 THER/PROPH/DIAG INJ IV PUSH: CPT | Mod: XU

## 2017-11-29 PROCEDURE — 99285 EMERGENCY DEPT VISIT HI MDM: CPT | Mod: 25

## 2017-11-29 PROCEDURE — 85014 HEMATOCRIT: CPT

## 2017-11-29 PROCEDURE — 84295 ASSAY OF SERUM SODIUM: CPT

## 2017-11-29 PROCEDURE — 86225 DNA ANTIBODY NATIVE: CPT

## 2017-11-29 PROCEDURE — 84100 ASSAY OF PHOSPHORUS: CPT

## 2017-11-29 PROCEDURE — 73502 X-RAY EXAM HIP UNI 2-3 VIEWS: CPT

## 2017-11-29 PROCEDURE — 83735 ASSAY OF MAGNESIUM: CPT

## 2017-11-29 PROCEDURE — 85730 THROMBOPLASTIN TIME PARTIAL: CPT

## 2017-11-29 PROCEDURE — 81001 URINALYSIS AUTO W/SCOPE: CPT

## 2017-11-29 PROCEDURE — 73502 X-RAY EXAM HIP UNI 2-3 VIEWS: CPT | Mod: 26,LT

## 2017-11-29 PROCEDURE — 82803 BLOOD GASES ANY COMBINATION: CPT

## 2017-11-29 PROCEDURE — 83605 ASSAY OF LACTIC ACID: CPT

## 2017-11-29 PROCEDURE — 85610 PROTHROMBIN TIME: CPT

## 2017-11-29 PROCEDURE — 71046 X-RAY EXAM CHEST 2 VIEWS: CPT

## 2017-11-29 PROCEDURE — 99239 HOSP IP/OBS DSCHRG MGMT >30: CPT

## 2017-11-29 PROCEDURE — 85027 COMPLETE CBC AUTOMATED: CPT

## 2017-11-29 RX ORDER — SERTRALINE 25 MG/1
50 TABLET, FILM COATED ORAL DAILY
Qty: 0 | Refills: 0 | Status: DISCONTINUED | OUTPATIENT
Start: 2017-11-29 | End: 2017-11-29

## 2017-11-29 RX ORDER — SERTRALINE 25 MG/1
1 TABLET, FILM COATED ORAL
Qty: 30 | Refills: 0 | OUTPATIENT
Start: 2017-11-29 | End: 2017-12-29

## 2017-11-29 RX ADMIN — LISINOPRIL 10 MILLIGRAM(S): 2.5 TABLET ORAL at 06:27

## 2017-11-29 RX ADMIN — OXYCODONE AND ACETAMINOPHEN 1 TABLET(S): 5; 325 TABLET ORAL at 00:35

## 2017-11-29 RX ADMIN — OXYCODONE AND ACETAMINOPHEN 1 TABLET(S): 5; 325 TABLET ORAL at 06:26

## 2017-11-29 RX ADMIN — MYCOPHENOLATE MOFETIL 1500 MILLIGRAM(S): 250 CAPSULE ORAL at 06:27

## 2017-11-29 RX ADMIN — Medication 200 MILLIGRAM(S): at 06:27

## 2017-11-29 RX ADMIN — CARVEDILOL PHOSPHATE 25 MILLIGRAM(S): 80 CAPSULE, EXTENDED RELEASE ORAL at 06:27

## 2017-11-29 RX ADMIN — SERTRALINE 50 MILLIGRAM(S): 25 TABLET, FILM COATED ORAL at 12:46

## 2017-11-29 RX ADMIN — OXYCODONE AND ACETAMINOPHEN 1 TABLET(S): 5; 325 TABLET ORAL at 01:05

## 2017-11-29 NOTE — PROGRESS NOTE ADULT - PROBLEM SELECTOR PLAN 3
-Currently follows up with Dr. Jose Wolff (Rheumatology)  -In belimumab study, primary researcher notified 11/29/17, as Dr. Wolff is at [facility] research team has access to records of current hospitalization already.  -Plaquenil 200 mg BID  -Will continue Cellcept 100 mg am, 1500 mg pm  -C3/C4 normal, DsDNA antibody elevated  -Rheumatology consulted, not concerned for acute flare-up currently -Currently follows up with Dr. Jose Mcdonald (Rheumatology)  -In belimumab study, primary researcher notified 11/29/17, as Dr. Mcdonald is at [facility] research team has access to records of current hospitalization already.  -Plaquenil 200 mg BID  -Will continue Cellcept 100 mg am, 1500 mg pm  -C3/C4 normal, DsDNA antibody elevated  -Rheumatology consulted, not concerned for acute flare-up currently

## 2017-11-29 NOTE — DISCHARGE NOTE ADULT - SECONDARY DIAGNOSIS.
Moderate single current episode of major depressive disorder Left inguinal pain Systemic lupus erythematosus, unspecified SLE type, unspecified organ involvement status Antiphospholipid syndrome History of pulmonary embolus (PE) Chronic diastolic heart failure

## 2017-11-29 NOTE — PROGRESS NOTE ADULT - PROBLEM SELECTOR PLAN 6
-History of DVT/PE unprovoked, diagnosed as possible lupus anti-coagulant and put on lifelong anti-coagulation.   -Beta-2 glycoprotein Ab and anti-cardiolipin Ab were both negative in 12/2016 (reviewed Dr. Mcdonald's outpatient records).   -INR currently 2.55, therapeutic  -Pt takes 6 mg warfarin at home, will continue  -Advise pt to avoid foods high in vitamin K (leafy greens)

## 2017-11-29 NOTE — PROGRESS NOTE ADULT - PROBLEM SELECTOR PLAN 1
-Urine chlamydia/GC nucleic acid amplification negative.   -Blood/urine cultures no growth to date.   -Patient denies any genital rash or inguinal swelling or recent risky sexual exposures to suggest STD related inguinal pain.   -Pt is opiate naive however tylenol not helping with pain, pt cannot take NSAIDS due to lupus nephritis  -Started percocet 5-325 Q6H PRN, demonstrating adequate pain control  -XR L hip ordered

## 2017-11-29 NOTE — DISCHARGE NOTE ADULT - CARE PLAN
Principal Discharge DX:	Epiploic appendagitis  Goal:	Pain control  Instructions for follow-up, activity and diet:	You were found to have abnormal twisting of abdominal fat in your left lower abdomen which is a likely explanation of your pain.  Your pain was well controlled with Percocet.  We have sent you a 5-day supply of Percocet (take no more than 3 a day) for pain control, take only when needed.  If your pain severely worsens, or if you develop a fever, please notify your doctor or come to the hospital.  Secondary Diagnosis:	Moderate single current episode of major depressive disorder  Goal:	Treat depression  Instructions for follow-up, activity and diet:	Your inability to go to work is a sign that you have underlying depression that can be treated with medication and therapy.  It is a great sign that you are willing to get treatment.  Please call [facility] at [phone] to set up a therapy appointment.  Take sertraline daily, if you feel too tired during the day, you can take this medication at night.  Follow up with your doctor.  Secondary Diagnosis:	Left inguinal pain  Goal:	Pain control  Instructions for follow-up, activity and diet:	Your pain was primarily in your groin, which is what the Percocet is targeting.  Again, if you notice any worsening of this pain, please contact your doctor or return to the hospital.  Secondary Diagnosis:	Systemic lupus erythematosus, unspecified SLE type, unspecified organ involvement status  Goal:	Treat lupus  Instructions for follow-up, activity and diet:	Please continue to take your home medications and follow up with Dr. Mcdonald.  Make sure you continue your Belimumab, which you should take next week.  If you are concerned of a flare-up (rash, joint pain, fatigue, difficulty breathing, chest pain) please notify your doctor.  Secondary Diagnosis:	Antiphospholipid syndrome  Goal:	Treat antiphospholipid syndrome, prevent clotting  Instructions for follow-up, activity and diet:	After looking through your outpatient records and speaking with rheumatology, we have confirmed that you have a condition known as "Seronegative antiphospholipid syndrome."  This can occur in some people with lupus.  This condition places you at an increased risk for getting a clot in your lungs.  Please continue to take your lupus medications and remain on coumadin.  Follow up with Dr. Mcdonald.  Secondary Diagnosis:	History of pulmonary embolus (PE)  Goal:	Prevent clotting  Instructions for follow-up, activity and diet:	Please continue to take coumadin.  Your goal INR is between 2 and 3.  If you feel short of breath, notify your doctor.  Secondary Diagnosis:	Chronic diastolic heart failure  Goal:	Take heart medications  Instructions for follow-up, activity and diet:	Continue to take coreg and lisinopril as you were at home.  Follow up with your doctor. Principal Discharge DX:	Epiploic appendagitis  Goal:	Pain control  Instructions for follow-up, activity and diet:	You were found to have abnormal twisting of abdominal fat in your left lower abdomen which is a likely explanation of your pain.  Your pain was well controlled with Percocet.  We have sent you a 5-day supply of Percocet (take no more than 3 a day) for pain control, take only when needed.  If your pain severely worsens, or if you develop a fever, please notify your doctor or come to the hospital.  Secondary Diagnosis:	Moderate single current episode of major depressive disorder  Goal:	Treat depression  Instructions for follow-up, activity and diet:	Your inability to go to work is a sign that you have underlying depression that can be treated with medication and therapy.  It is a great sign that you are willing to get treatment.  Please call [facility] at [phone] to set up a therapy appointment.  Take sertraline daily, if you feel too tired during the day, you can take this medication at night.  Follow up with your doctor.  Secondary Diagnosis:	Left inguinal pain  Goal:	Pain control  Instructions for follow-up, activity and diet:	Your pain was primarily in your groin, which is what the Percocet is targeting.  If pain persists, we recommend a follow-up MRI of your hip in the outpatient setting.  Again, if you notice any worsening of this pain, please contact your doctor or return to the hospital.  Secondary Diagnosis:	Systemic lupus erythematosus, unspecified SLE type, unspecified organ involvement status  Goal:	Treat lupus  Instructions for follow-up, activity and diet:	Please continue to take your home medications and follow up with Dr. Mcdonald.  Make sure you continue your Belimumab, which you should take next week.  If you are concerned of a flare-up (rash, joint pain, fatigue, difficulty breathing, chest pain) please notify your doctor.  Secondary Diagnosis:	Antiphospholipid syndrome  Goal:	Treat antiphospholipid syndrome, prevent clotting  Instructions for follow-up, activity and diet:	After looking through your outpatient records and speaking with rheumatology, we have confirmed that you have a condition known as "Seronegative antiphospholipid syndrome."  This can occur in some people with lupus.  This condition places you at an increased risk for getting a clot in your lungs.  Please continue to take your lupus medications and remain on coumadin.  Follow up with Dr. Mcdonald.  Secondary Diagnosis:	History of pulmonary embolus (PE)  Goal:	Prevent clotting  Instructions for follow-up, activity and diet:	Please continue to take coumadin.  Your goal INR is between 2 and 3.  If you feel short of breath, notify your doctor.  Secondary Diagnosis:	Chronic diastolic heart failure  Goal:	Take heart medications  Instructions for follow-up, activity and diet:	Continue to take coreg and lisinopril as you were at home.  Follow up with your doctor. Principal Discharge DX:	Epiploic appendagitis  Goal:	Pain control  Instructions for follow-up, activity and diet:	You were found to have abnormal twisting of abdominal fat in your left lower abdomen which is a likely explanation of your pain.  Your pain was well controlled with Percocet.  We have sent you a 5-day supply of Percocet (take no more than 3 a day) for pain control, take only when needed.  If your pain severely worsens, or if you develop a fever, please notify your doctor or come to the hospital.  Secondary Diagnosis:	Moderate single current episode of major depressive disorder  Goal:	Treat depression  Instructions for follow-up, activity and diet:	Your inability to go to work is a sign that you have underlying depression that can be treated with therapy.  It is a great sign that you are willing to get treatment.  Please call [facility] at [phone] to set up a therapy appointment and potentially explore starting medication.  Follow up with your doctor.  Secondary Diagnosis:	Left inguinal pain  Goal:	Pain control  Instructions for follow-up, activity and diet:	Your pain was primarily in your groin, which is what the Percocet is targeting.  If pain persists, we recommend a follow-up MRI of your hip in the outpatient setting.  Again, if you notice any worsening of this pain, please contact your doctor or return to the hospital.  Secondary Diagnosis:	Systemic lupus erythematosus, unspecified SLE type, unspecified organ involvement status  Goal:	Treat lupus  Instructions for follow-up, activity and diet:	Please continue to take your home medications and follow up with Dr. Mcdonald.  Make sure you continue your Belimumab, which you should take next week.  If you are concerned of a flare-up (rash, joint pain, fatigue, difficulty breathing, chest pain) please notify your doctor.  Secondary Diagnosis:	Antiphospholipid syndrome  Goal:	Treat antiphospholipid syndrome, prevent clotting  Instructions for follow-up, activity and diet:	After looking through your outpatient records and speaking with rheumatology, we have confirmed that you have a condition known as "Seronegative antiphospholipid syndrome."  This can occur in some people with lupus.  This condition places you at an increased risk for getting a clot in your lungs.  Please continue to take your lupus medications and remain on coumadin.  Follow up with Dr. Mcdonald.  Secondary Diagnosis:	History of pulmonary embolus (PE)  Goal:	Prevent clotting  Instructions for follow-up, activity and diet:	Please continue to take coumadin.  Your goal INR is between 2 and 3.  If you feel short of breath, notify your doctor.  Secondary Diagnosis:	Chronic diastolic heart failure  Goal:	Take heart medications  Instructions for follow-up, activity and diet:	Continue to take coreg and lisinopril as you were at home.  Follow up with your doctor. Principal Discharge DX:	Epiploic appendagitis  Goal:	Pain control  Instructions for follow-up, activity and diet:	You were found to have abnormal twisting of abdominal fat in your left lower abdomen which is a likely explanation of your pain.  Your pain was well controlled with Percocet.  We have sent you a 5-day supply of Percocet (take no more than 3 a day) for pain control, take only when needed.  If your pain severely worsens, or if you develop a fever, please notify your doctor or come to the hospital.  Secondary Diagnosis:	Moderate single current episode of major depressive disorder  Goal:	Treat depression  Instructions for follow-up, activity and diet:	Your inability to go to work is a sign that you have underlying depression that can be treated with therapy.  It is a great sign that you are willing to get treatment.  Please call [facility] at [phone] to set up a therapy appointment and potentially explore starting medication.  Follow up with your doctor. You should also establish care with a primary care physician such as Dr. Thompson to help you manage your chronic conditions on an outpatient basis.  Secondary Diagnosis:	Left inguinal pain  Goal:	Pain control  Instructions for follow-up, activity and diet:	Your pain was primarily in your groin, which is what the Percocet is targeting.  If pain persists, we recommend a follow-up MRI of your hip in the outpatient setting.  Again, if you notice any worsening of this pain, please contact your doctor or return to the hospital.  Secondary Diagnosis:	Systemic lupus erythematosus, unspecified SLE type, unspecified organ involvement status  Goal:	Treat lupus  Instructions for follow-up, activity and diet:	Please continue to take your home medications and follow up with Dr. Mcdonald.  Make sure you continue your Belimumab, which you should take next week.  If you are concerned of a flare-up (rash, joint pain, fatigue, difficulty breathing, chest pain) please notify your doctor.  Secondary Diagnosis:	Antiphospholipid syndrome  Goal:	Treat antiphospholipid syndrome, prevent clotting  Instructions for follow-up, activity and diet:	After looking through your outpatient records and speaking with rheumatology, we have confirmed that you have a condition known as "Seronegative antiphospholipid syndrome."  This can occur in some people with lupus.  This condition places you at an increased risk for getting a clot in your lungs.  Please continue to take your lupus medications and remain on coumadin.  Follow up with Dr. Mcdonald.  Secondary Diagnosis:	History of pulmonary embolus (PE)  Goal:	Prevent clotting  Instructions for follow-up, activity and diet:	Please continue to take coumadin.  Your goal INR is between 2 and 3.  If you feel short of breath, notify your doctor.  Secondary Diagnosis:	Chronic diastolic heart failure  Goal:	Take heart medications  Instructions for follow-up, activity and diet:	Continue to take coreg and lisinopril as you were at home.  Follow up with your doctor. Principal Discharge DX:	Epiploic appendagitis  Goal:	Pain control  Instructions for follow-up, activity and diet:	You were found to have abnormal twisting of abdominal fat in your left lower abdomen which is a likely explanation of your pain.  Your pain was well controlled with Percocet.  We have sent you a 5-day supply of Percocet (take no more than 3 a day) for pain control, take only when needed.  If your pain severely worsens, or if you develop a fever, please notify your doctor or come to the hospital.  Secondary Diagnosis:	Moderate single current episode of major depressive disorder  Goal:	Treat depression  Instructions for follow-up, activity and diet:	Your inability to go to work is a sign that you have underlying depression that can be treated with therapy.  It is a great sign that you are willing to get treatment.  Please call [facility] at [phone] to set up a therapy appointment and potentially explore starting medication.  Follow up with your doctor. You should also establish care with a primary care physician such as Dr. Thompson to help you manage your chronic conditions on an outpatient basis.  Secondary Diagnosis:	Left inguinal pain  Goal:	Pain control  Instructions for follow-up, activity and diet:	Your pain was primarily in your groin, which is what the Percocet is targeting.  If pain persists, we recommend a follow-up MRI of your Left hip in the outpatient setting.  Again, if you notice any worsening of this pain, please contact your doctor or return to the hospital.  Secondary Diagnosis:	Systemic lupus erythematosus, unspecified SLE type, unspecified organ involvement status  Goal:	Treat lupus  Instructions for follow-up, activity and diet:	Please continue to take your home medications and follow up with Dr. Mcdonald.  Make sure you continue your Belimumab, which you should take next week.  If you are concerned of a flare-up (rash, joint pain, fatigue, difficulty breathing, chest pain) please notify your doctor.  Secondary Diagnosis:	Antiphospholipid syndrome  Goal:	Treat antiphospholipid syndrome, prevent clotting  Instructions for follow-up, activity and diet:	After looking through your outpatient records and speaking with rheumatology, we have confirmed that you have a condition known as "Seronegative antiphospholipid syndrome."  This can occur in some people with lupus.  This condition places you at an increased risk for getting a clot in your lungs.  Please continue to take your lupus medications and remain on coumadin.  Follow up with Dr. Mcdonald.  Secondary Diagnosis:	History of pulmonary embolus (PE)  Goal:	Prevent clotting  Instructions for follow-up, activity and diet:	Please continue to take coumadin.  Your goal INR is between 2 and 3.  If you feel short of breath, notify your doctor.  Secondary Diagnosis:	Chronic diastolic heart failure  Goal:	Take heart medications  Instructions for follow-up, activity and diet:	Continue to take coreg and lisinopril as you were at home.  Follow up with your doctor.

## 2017-11-29 NOTE — PROGRESS NOTE ADULT - PROBLEM SELECTOR PLAN 8
-DVT prophylaxis with warfarin.    Dispo: Discharge home after adequate pain control documented    Matthew Barroso  PGY-1  [phone]

## 2017-11-29 NOTE — DISCHARGE NOTE ADULT - CARE PROVIDERS DIRECT ADDRESSES
[email or web address]

## 2017-11-29 NOTE — PROGRESS NOTE ADULT - PROBLEM SELECTOR PLAN 4
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

## 2017-11-29 NOTE — DISCHARGE NOTE ADULT - PLAN OF CARE
Pain control You were found to have abnormal twisting of abdominal fat in your left lower abdomen which is a likely explanation of your pain.  Your pain was well controlled with Percocet.  We have sent you a 5-day supply of Percocet (take no more than 3 a day) for pain control, take only when needed.  If your pain severely worsens, or if you develop a fever, please notify your doctor or come to the hospital. Treat depression Your inability to go to work is a sign that you have underlying depression that can be treated with medication and therapy.  It is a great sign that you are willing to get treatment.  Please call [facility] at [phone] to set up a therapy appointment.  Take sertraline daily, if you feel too tired during the day, you can take this medication at night.  Follow up with your doctor. Your pain was primarily in your groin, which is what the Percocet is targeting.  Again, if you notice any worsening of this pain, please contact your doctor or return to the hospital. Treat lupus Please continue to take your home medications and follow up with Dr. Mcdonald.  Make sure you continue your Belimumab, which you should take next week.  If you are concerned of a flare-up (rash, joint pain, fatigue, difficulty breathing, chest pain) please notify your doctor. Treat antiphospholipid syndrome, prevent clotting After looking through your outpatient records and speaking with rheumatology, we have confirmed that you have a condition known as "Seronegative antiphospholipid syndrome."  This can occur in some people with lupus.  This condition places you at an increased risk for getting a clot in your lungs.  Please continue to take your lupus medications and remain on coumadin.  Follow up with Dr. Mcdonald. Prevent clotting Please continue to take coumadin.  Your goal INR is between 2 and 3.  If you feel short of breath, notify your doctor. Take heart medications Continue to take coreg and lisinopril as you were at home.  Follow up with your doctor. Your pain was primarily in your groin, which is what the Percocet is targeting.  If pain persists, we recommend a follow-up MRI of your hip in the outpatient setting.  Again, if you notice any worsening of this pain, please contact your doctor or return to the hospital. Your inability to go to work is a sign that you have underlying depression that can be treated with therapy.  It is a great sign that you are willing to get treatment.  Please call [facility] at [phone] to set up a therapy appointment and potentially explore starting medication.  Follow up with your doctor. Your inability to go to work is a sign that you have underlying depression that can be treated with therapy.  It is a great sign that you are willing to get treatment.  Please call [facility] at [phone] to set up a therapy appointment and potentially explore starting medication.  Follow up with your doctor. You should also establish care with a primary care physician such as Dr. Thompson to help you manage your chronic conditions on an outpatient basis. Your pain was primarily in your groin, which is what the Percocet is targeting.  If pain persists, we recommend a follow-up MRI of your Left hip in the outpatient setting.  Again, if you notice any worsening of this pain, please contact your doctor or return to the hospital.

## 2017-11-29 NOTE — DISCHARGE NOTE ADULT - MEDICATION SUMMARY - MEDICATIONS TO TAKE
I will START or STAY ON the medications listed below when I get home from the hospital:    oxyCODONE-acetaminophen 5 mg-325 mg oral tablet  -- 1 tab(s) by mouth every 8 hours as needed for severe pain, take less as tolerated.  Take no more than 3 pills a day. MDD:3 pills  -- Indication: For Left inguinal pain    lisinopril 10 mg oral tablet  -- 1 tab(s) by mouth once a day  -- Indication: For Lupus nephritis    Coumadin 1 mg oral tablet  -- 6 tab(s) by mouth once a day (at bedtime)  -- Indication: For Antiphospholipid Syndrome    sertraline 50 mg oral tablet  -- 1 tab(s) by mouth once a day  -- Indication: For Depression    Plaquenil 200 mg oral tablet  -- 1 tab(s) by mouth 2 times a day  -- Indication: For Systemic lupus    Coreg CR 40 mg oral capsule, extended release  -- 1 cap(s) by mouth once a day (in the morning)  -- Indication: For Chronic heart failure, unspecified heart failure type    CellCept 500 mg oral tablet  -- 3 tab(s) in the morning and 2 tab(s) in the evening  -- Do not take this drug if you are pregnant.    -- Indication: For Systemic lupus I will START or STAY ON the medications listed below when I get home from the hospital:    oxyCODONE-acetaminophen 5 mg-325 mg oral tablet  -- 1 tab(s) by mouth every 8 hours as needed for severe pain, take less as tolerated.  Take no more than 3 pills a day. MDD:3 pills  -- Indication: For Pain    lisinopril 10 mg oral tablet  -- 1 tab(s) by mouth once a day  -- Indication: For Hypertension    Coumadin 1 mg oral tablet  -- 6 tab(s) by mouth once a day (at bedtime)  -- Indication: For DVT (deep venous thrombosis)    Plaquenil 200 mg oral tablet  -- 1 tab(s) by mouth 2 times a day  -- Indication: For Lupus nephritis    Coreg CR 40 mg oral capsule, extended release  -- 1 cap(s) by mouth once a day (in the morning)  -- Indication: For Hypertension    CellCept 500 mg oral tablet  -- 3 tab(s) in the morning and 2 tab(s) in the evening  -- Do not take this drug if you are pregnant.    -- Indication: For Lupus

## 2017-11-29 NOTE — DISCHARGE NOTE ADULT - CARE PROVIDER_API CALL
Jose Mcdonald), Internal Medicine; Rheumatology  [address] [place] [address]  Phone: [phone]  Fax: [phone] Jose Mcdonald), Internal Medicine; Rheumatology  [address] [address]  Phone: [phone]  Fax: [phone]    Sammi Thompson), Internal Medicine  [address] [place] [address]  Phone: [phone]  Fax: [phone]

## 2017-11-29 NOTE — PROGRESS NOTE ADULT - SUBJECTIVE AND OBJECTIVE BOX
Patient is a 27y old  Male who presents with a chief complaint of 27M w/ 3d of LLQ pain (26 Nov 2017 20:26)      SUBJECTIVE / OVERNIGHT EVENTS:    MEDICATIONS  (STANDING):  carvedilol 25 milliGRAM(s) Oral every 12 hours  hydroxychloroquine 200 milliGRAM(s) Oral every 12 hours  lisinopril 10 milliGRAM(s) Oral daily  mycophenolate mofetil 1500 milliGRAM(s) Oral <User Schedule>  mycophenolate mofetil 1000 milliGRAM(s) Oral <User Schedule>  sertraline 50 milliGRAM(s) Oral daily    MEDICATIONS  (PRN):  oxyCODONE    5 mG/acetaminophen 325 mG 1 Tablet(s) Oral every 6 hours PRN Severe Pain (7 - 10)        CAPILLARY BLOOD GLUCOSE        I&O's Summary    28 Nov 2017 07:01  -  29 Nov 2017 07:00  --------------------------------------------------------  IN: 920 mL / OUT: 0 mL / NET: 920 mL        PHYSICAL EXAM:  GENERAL: NAD, well-developed  HEAD:  Atraumatic, Normocephalic  EYES: EOMI, PERRLA, conjunctiva and sclera clear  NECK: Supple, No JVD  CHEST/LUNG: Clear to auscultation bilaterally; No wheeze  HEART: Regular rate and rhythm; No murmurs, rubs, or gallops  ABDOMEN: Soft, Nontender, Nondistended; Bowel sounds present  EXTREMITIES:  2+ Peripheral Pulses, No clubbing, cyanosis, or edema  PSYCH: AAOx3  NEUROLOGY: non-focal  SKIN: No rashes or lesions    LABS:                        12.2   8.2   )-----------( 308      ( 29 Nov 2017 07:22 )             40.0     WBC Trend: 8.2<--, 8.9<--, 9.33<--  11-29    138  |  101  |  11  ----------------------------<  83  3.8   |  24  |  0.89    Ca    9.3      29 Nov 2017 07:22  Phos  3.5     11-28  Mg     2.0     11-28    TPro  6.6  /  Alb  3.6  /  TBili  0.4  /  DBili  x   /  AST  18  /  ALT  7<L>  /  AlkPhos  66  11-28    Creatinine Trend: 0.89<--, 0.96<--, 0.93<--, 1.06<--  PT/INR - ( 29 Nov 2017 07:22 )   PT: 28.3 sec;   INR: 2.55 ratio         PTT - ( 28 Nov 2017 07:47 )  PTT:43.2 sec            RADIOLOGY & ADDITIONAL TESTS:    Imaging Personally Reviewed:    Consultant(s) Notes Reviewed:      Care Discussed with Consultants/Other Providers: Patient is a 27y old  Male who presents with a chief complaint of 27M w/ 3d of LLQ pain (26 Nov 2017 20:26)    SUBJECTIVE / OVERNIGHT EVENTS: Pt reports better pain control with percocet, states he is able to function better.  Pt additionally expressing interest in starting antidepressant medication today, stating that he is motivated to get help.  Pt did not have BM yesterday.  Denies fever, chills, chest pain, dyspnea, nausea, vomiting, diarrhea, bloating, n/t in extremities.      MEDICATIONS  (STANDING):  carvedilol 25 milliGRAM(s) Oral every 12 hours  hydroxychloroquine 200 milliGRAM(s) Oral every 12 hours  lisinopril 10 milliGRAM(s) Oral daily  mycophenolate mofetil 1500 milliGRAM(s) Oral <User Schedule>  mycophenolate mofetil 1000 milliGRAM(s) Oral <User Schedule>  sertraline 50 milliGRAM(s) Oral daily    MEDICATIONS  (PRN):  oxyCODONE    5 mG/acetaminophen 325 mG 1 Tablet(s) Oral every 6 hours PRN Severe Pain (7 - 10)      Vital Signs Last 24 Hrs  T(C): 36.7 (29 Nov 2017 06:31), Max: 36.9 (28 Nov 2017 13:34)  T(F): 98 (29 Nov 2017 06:31), Max: 98.5 (28 Nov 2017 13:34)  HR: 69 (29 Nov 2017 06:31) (69 - 83)  BP: 116/77 (29 Nov 2017 06:31) (98/66 - 116/77)  BP(mean): --  RR: 16 (29 Nov 2017 06:31) (16 - 18)  SpO2: 98% (29 Nov 2017 06:31) (95% - 99%)    CAPILLARY BLOOD GLUCOSE        I&O's Summary    28 Nov 2017 07:01  -  29 Nov 2017 07:00  --------------------------------------------------------  IN: 920 mL / OUT: 0 mL / NET: 920 mL        PHYSICAL EXAM:  GENERAL: NAD, well-developed  HEAD:  Atraumatic, Normocephalic  EYES: EOMI, PERRLA, conjunctiva and sclera clear  CHEST/LUNG: Clear to auscultation bilaterally; No wheeze  HEART: Regular rate and rhythm; No murmurs, rubs, or gallops  ABDOMEN: LLQ tenderness with involuntary guarding in L inguinal region upon deep palpation, Nondistended; Bowel sounds present  EXTREMITIES:  2+ Peripheral Pulses, No clubbing, cyanosis, or edema  MSK: No pain elicited upon internal/external rotation of L hip  PSYCH: Euthymic appearance today. Denies SI/HI.  Judgment Good (willing to start antidepressant medication)/ Insight fair; A&O x3  SKIN: Vertical scab-like lesions over sides of abdomen consistent w/ chronic prednisone use    LABS:    DSDNA- 90  GC/Chlamydia Urine not detected  C3 108  C4 21      LABS:                        12.2   8.2   )-----------( 308      ( 29 Nov 2017 07:22 )             40.0     WBC Trend: 8.2<--, 8.9<--, 9.33<--  11-29    138  |  101  |  11  ----------------------------<  83  3.8   |  24  |  0.89    Ca    9.3      29 Nov 2017 07:22  Phos  3.5     11-28  Mg     2.0     11-28    TPro  6.6  /  Alb  3.6  /  TBili  0.4  /  DBili  x   /  AST  18  /  ALT  7<L>  /  AlkPhos  66  11-28    Creatinine Trend: 0.89<--, 0.96<--, 0.93<--, 1.06<--  PT/INR - ( 29 Nov 2017 07:22 )   PT: 28.3 sec;   INR: 2.55 ratio         PTT - ( 28 Nov 2017 07:47 )  PTT:43.2 sec        RADIOLOGY & ADDITIONAL TESTS:    Consultant(s) Notes Reviewed:  Rheumatology    Care Discussed with Consultants/Other Providers: Rheumatology

## 2017-11-29 NOTE — PROGRESS NOTE ADULT - PROBLEM SELECTOR PLAN 2
-Finding on CT abdomen, could be explanation for pain however pain appears more localized to L inguinal region  -Blood/urine cultures no growth to date.

## 2017-11-29 NOTE — DISCHARGE NOTE ADULT - PATIENT PORTAL LINK FT
“You can access the Follow[employer] Patient Portal, offered by [facility], by registering with the following website: [email or web address]”

## 2017-11-29 NOTE — DISCHARGE NOTE ADULT - HOSPITAL COURSE
Pt is a 26 YO M w/ SLE c/b myocarditis, pericarditis in 2007 on Plaquenil, Cellcept, and currently in Belimumab study (receives monthly, next dosing 12/8/17), lupus nephritis, seronegative antiphospholipid syndrome with history of R DVT/PE on coumadin presenting to [facility] on 11/26/17 with acute onset LLQ abdominal pain with recorded home temp 101.   Pain started 4 days prior, had interfered with regular activities such as being able to walk dog due to pain with walking.  Reported that he used to take ibuprofen for pain but cannot any longer due to his lupus nephritis.  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  On initial presentation, physical exam findings of L inguinal pain with involuntary guarding.  CT abdomen (11/27) significant only for nonspecific fatty straining in LLQ suggestive of epiploic appendagitis, however did not suggest inguinal hernia or concern for abscess.      Of note, chart review from [employer] reveals that pt does not have a history of positive anticardiolipin antibody, however had an unprovoked DVT/PE in 2007/2008 and is suspected to have seronegative antiphospholipid syndrome. Pt is a 28 YO M w/ SLE c/b myocarditis, pericarditis in 2007 on Plaquenil, Cellcept, and currently in Belimumab study (receives monthly, next dosing 12/8/17), lupus nephritis, seronegative antiphospholipid syndrome with history of R DVT/PE on coumadin presenting to [facility] on 11/26/17 with acute onset LLQ abdominal pain with recorded home temp 101.  Also reported x2 loose stools on day of admission.   Pain started 4 days prior, had interfered with regular activities such as being able to walk dog due to pain with walking.  Reported that he used to take ibuprofen for pain but cannot any longer due to his lupus nephritis.  Of note, pt was considered opiate naive as he had not been prescribed narcotics at [facility] based on chart review and I-STOP.  On initial presentation, physical exam findings of L inguinal pain with involuntary guarding.  CT abdomen (11/27) significant only for nonspecific fatty straining in LLQ suggestive of epiploic appendagitis, however did not suggest inguinal hernia or concern for abscess.  Labs significant for subtherapeutic INR (1.59, pt stated he did not take coumadin x2 days previously), elevation in Anti-DsDNA (90), normal C3/C4.  CBC showed Hb 11.3, this remained stable throughout hospitalization.  Other labs were unremarkable, including CMP (cr 0.96).  Rheumatology consulted, evaluated pt on 11/28/17.  Expressed no concern of flare-up of SLE or anti-phospholipid syndrome, recommended continuation of home lupus medications and coumadin.  During hospitalization, pt did not record a fever.  Blood pressure and heart rate were well controlled.      Pt additionally endorsed depressed mood, anhedonia, hypersomnia, feelings of guilt, and difficulty concentrating consistent with MDD.  Pt reported he had not had motivation to go to work for past month.  Pt documented willingness to treat depression with psychotherapy, was hesitant to starting antidepressant therapy but after consideration agreed to start sertraline 50 mg PO daily, which was cleared by research investigator.  Pt provided contact for [facility].      Of note, chart review from [employer] reveals that pt does not have a history of positive anticardiolipin antibody, however had an unprovoked DVT/PE in 2007/2008 and is suspected to have seronegative antiphospholipid syndrome. Pt is a 26 YO M w/ SLE c/b myocarditis, pericarditis in 2007 on Plaquenil, Cellcept, and currently in Belimumab study (receives monthly, next dosing 12/8/17), lupus nephritis, seronegative antiphospholipid syndrome with history of R DVT/PE on coumadin presenting to [facility] on 11/26/17 with acute onset LLQ abdominal pain with recorded home temp 101.  Also reported x2 loose stools on day of admission.   Pain started 4 days prior, had interfered with regular activities such as being able to walk dog due to pain with walking.  Reported that he used to take ibuprofen for pain but cannot any longer due to his lupus nephritis.  Of note, pt was considered opiate naive as he had not been prescribed narcotics at [facility] based on chart review and I-STOP.  On initial presentation, physical exam findings of L inguinal pain with involuntary guarding.  CT abdomen (11/27) significant only for nonspecific fatty straining in LLQ suggestive of epiploic appendagitis, however did not suggest inguinal hernia or concern for abscess.  XR L hip (11/29/17) significant for 2 mm deposition of hydroxyapatite in L greater tuberosity. Labs significant for subtherapeutic INR (1.59, pt stated he did not take coumadin x2 days previously), elevation in Anti-DsDNA (90), normal C3/C4.  CBC showed Hb 11.3, this remained stable throughout hospitalization.  Other labs were unremarkable, including CMP (cr 0.96).  Rheumatology consulted, evaluated pt on 11/28/17.  Expressed no concern of flare-up of SLE or anti-phospholipid syndrome, recommended continuation of home lupus medications and coumadin.  During hospitalization, pt did not record a fever.  Blood pressure and heart rate were well controlled.      Pt additionally endorsed depressed mood, anhedonia, hypersomnia, feelings of guilt, and difficulty concentrating consistent with MDD.  Pt reported he had not had motivation to go to work for past month.  Pt documented willingness to treat depression with psychotherapy, was hesitant to starting antidepressant therapy but after consideration agreed to start sertraline 50 mg PO daily, which was cleared by research investigator.  Pt provided contact for [facility].      Of note, chart review from [employer] reveals that pt does not have a history of positive anticardiolipin antibody, however had an unprovoked DVT/PE in 2007/2008 and is suspected to have seronegative antiphospholipid syndrome. Pt is a 28 YO M w/ SLE c/b myocarditis, pericarditis in 2007 on Plaquenil, Cellcept, and currently in Belimumab study (receives monthly, next dosing 12/8/17), lupus nephritis, seronegative antiphospholipid syndrome with history of R DVT/PE on coumadin presenting to [facility] on 11/26/17 with acute onset LLQ abdominal pain with recorded home temp 101.  Also reported x2 loose stools on day of admission.   Pain started 4 days prior, had interfered with regular activities such as being able to walk dog due to pain with walking.  Reported that he used to take ibuprofen for pain but cannot any longer due to his lupus nephritis.  Of note, pt was considered opiate naive as he had not been prescribed narcotics at [facility] based on chart review and I-STOP.  On initial presentation, physical exam findings of L inguinal pain with involuntary guarding.  CT abdomen (11/27) significant only for nonspecific fatty straining in LLQ suggestive of epiploic appendagitis, however did not suggest inguinal hernia or concern for abscess.  XR L hip (11/29/17) significant for 2 mm deposition of hydroxyapatite in L greater tuberosity. Labs significant for subtherapeutic INR (1.59, pt stated he did not take coumadin x2 days previously), elevation in Anti-DsDNA (90), normal C3/C4.  CBC showed Hb 11.3, this remained stable throughout hospitalization.  Other labs were unremarkable, including CMP (cr 0.96).  Rheumatology consulted, evaluated pt on 11/28/17.  Expressed no concern of flare-up of SLE or anti-phospholipid syndrome, recommended continuation of home lupus medications and coumadin.  During hospitalization, pt did not record a fever.  Blood pressure and heart rate were well controlled.      Pt additionally endorsed depressed mood, anhedonia, hypersomnia, feelings of guilt, and difficulty concentrating consistent with MDD.  Pt reported he had not had motivation to go to work for past month.  Pt documented willingness to treat depression with psychotherapy.  Pt provided contact for [facility].      Of note, chart review from [employer] reveals that pt does not have a history of positive anticardiolipin antibody, however had an unprovoked DVT/PE in 2007/2008 and is suspected to have seronegative antiphospholipid syndrome. Pt is a 28 YO M w/ SLE c/b myocarditis, pericarditis in 2007 on Plaquenil, Cellcept, and currently in Belimumab study (receives monthly, next dosing 12/8/17), lupus nephritis, seronegative antiphospholipid syndrome with history of R DVT/PE on coumadin presenting to [facility] on 11/26/17 with acute onset LLQ abdominal pain with recorded home temp 101.  Also reported x2 loose stools on day of admission.  Pain started 4 days prior, had interfered with regular activities such as being able to walk dog due to pain with walking.  Reported that he used to take ibuprofen for pain but cannot any longer due to his lupus nephritis.  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 based on chart review and I-STOP.  On initial presentation, physical exam findings of L inguinal pain with involuntary guarding.  CT abdomen (11/27) significant only for nonspecific fatty straining in LLQ suggestive of epiploic appendagitis, however did not suggest inguinal hernia or concern for abscess.  XR L hip (11/29/17) significant for 2 mm deposition of hydroxyapatite in L greater tuberosity. Labs significant for subtherapeutic INR (1.59, pt stated he did not take coumadin x2 days previously), elevation in Anti-DsDNA (90), normal C3/C4.  CBC showed Hb 11.3, this remained stable throughout hospitalization.  Other labs were unremarkable, including CMP (cr 0.96).  Rheumatology consulted, evaluated pt on 11/28/17.  Expressed no concern of flare-up of SLE or anti-phospholipid syndrome, recommended continuation of home lupus medications and coumadin.  During hospitalization, pt did not record a fever.  Blood pressure and heart rate were well controlled. The patient did not require antibiotics. The patient's pain was initially treated with tramadol with minimal improvement, then was given percocet and had better relief. His INR was therapeutic by discharge on coumadin 6mg at bedtime.     Pt additionally endorsed depressed mood, anhedonia, hypersomnia, feelings of guilt, and difficulty concentrating consistent with MDD.  Pt reported he had not had motivation to go to work for past month.  Pt documented willingness to treat depression with psychotherapy.  Pt provided contact for [facility].      Of note, chart review from [employer] reveals that pt does not have a history of positive anticardiolipin antibody, however had an unprovoked DVT/PE in 2007/2008 and is suspected to have seronegative antiphospholipid syndrome.      He was discharged to home in stable and improved condition. He was advised to have an MRI of the left hip as an outpatient. He was advised to see rheumatology, establish a PMD, and see a psychologist/psychiatrist as an outpatient. Pt is a 26 YO M w/ SLE c/b myocarditis, pericarditis in 2007 on Plaquenil, Cellcept, and currently in Belimumab study (receives monthly, next dosing 12/8/17), lupus nephritis, seronegative antiphospholipid syndrome with history of R DVT/PE on coumadin presenting to [facility] on 11/26/17 with acute onset LLQ abdominal pain with recorded home temp 101.  Also reported x2 loose stools on day of admission.  Pain started 4 days prior, had interfered with regular activities such as being able to walk dog due to pain with walking.  Reported that he used to take ibuprofen for pain but cannot any longer due to his lupus nephritis.  Of note, pt was considered opiate naive as he had not been prescribed narcotics at [facility] based on chart review and I-STOP (Ref # [id number])  On initial presentation, physical exam findings of L inguinal pain with involuntary guarding.  CT abdomen (11/27) significant only for nonspecific fatty straining in LLQ suggestive of epiploic appendagitis, however did not suggest inguinal hernia or concern for abscess.  XR L hip (11/29/17) significant for 2 mm deposition of hydroxyapatite in L greater tuberosity. Labs significant for subtherapeutic INR (1.59, pt stated he did not take coumadin x2 days previously), elevation in Anti-DsDNA (90), normal C3/C4.  CBC showed Hb 11.3, this remained stable throughout hospitalization.  Other labs were unremarkable, including CMP (cr 0.96).  Rheumatology consulted, evaluated pt on 11/28/17.  Expressed no concern of flare-up of SLE or anti-phospholipid syndrome, recommended continuation of home lupus medications and coumadin.  During hospitalization, pt did not record a fever.  Blood pressure and heart rate were well controlled. The patient did not require antibiotics. The patient's pain was initially treated with tramadol with minimal improvement, then was given percocet and had better relief. His INR was therapeutic by discharge on coumadin 6mg at bedtime.     Pt additionally endorsed depressed mood, anhedonia, hypersomnia, feelings of guilt, and difficulty concentrating consistent with MDD.  Pt reported he had not had motivation to go to work for past month.  Pt documented willingness to treat depression with psychotherapy.  Pt provided contact for [facility].      Of note, chart review from [employer] reveals that pt does not have a history of positive anticardiolipin antibody, however had an unprovoked DVT/PE in 2007/2008 and is suspected to have seronegative antiphospholipid syndrome.      He was discharged to home in stable and improved condition. He was advised to have an MRI of the left hip as an outpatient. He was advised to see rheumatology, establish a PMD, and see a psychologist/psychiatrist as an outpatient. Pt discharged on Percocet 5-325 mg x 15.

## 2017-11-29 NOTE — PROGRESS NOTE ADULT - PROBLEM SELECTOR PLAN 7
-Pt asymptomatic currently  -Takes carvedilol "40 mg" daily at home, may be incorrectly reported  -Currently on carvedilol 25 mg BID -Pt asymptomatic currently  -Takes carvedilol 40 mg CR daily at home.  -Currently on carvedilol 25 mg BID here

## 2017-11-29 NOTE — DISCHARGE NOTE ADULT - OTHER SIGNIFICANT FINDINGS
< from: CT Abdomen and Pelvis w/ Oral Cont and w/ IV Cont (11.26.17 @ 16:49) >  IMPRESSION:     Nonspecific fatty stranding in the left lower quadrant suggestive of   epiploic appendicitis..    < end of copied text >    IMPRESSION:    2 mm ossific density adjacent to the greater tuberosity likely represents   calcium hydroxyapatite deposition.     No acute fracture or dislocation of the head. < from: CT Abdomen and Pelvis w/ Oral Cont and w/ IV Cont (11.26.17 @ 16:49) >  IMPRESSION:     Nonspecific fatty stranding in the left lower quadrant suggestive of   epiploic appendicitis..    < end of copied text >    Left hip X-ray:     IMPRESSION:    2 mm ossific density adjacent to the greater tuberosity likely represents   calcium hydroxyapatite deposition.     No acute fracture or dislocation of the head.

## 2017-11-29 NOTE — PROGRESS NOTE ADULT - ATTENDING COMMENTS
-Patient's left hip X-ray showed 2 mm ossific density adjacent to the greater tuberosity likely represents calcium hydroxyapatite deposition. No acute fracture or dislocation of the head. Patient also with pain in the left inguinal region worse with external rotation of the hip and with flexion at the hip joint. Possibility that the patient's pain is coming from the hip. Recommended left hip MRI as an outpatient.   -Patient's pain responded well to percocet, will give 1 tab Q8H as needed at home for 5 days, however advised patient not to take it unless he really needs it. Advised him to see his rheumatologist within 1-2 weeks for follow up.   -Patient wanted to start anti-depressant today and Dr. Mcdonald's  (Maciej) said that Dr. Mcdonald was ok with the patient starting zoloft, but the covering rheumatology attending didn't feel comfortable with him starting it yet. The  will discuss with Dr. Mcdonald regarding starting the zoloft outpatient. Patient should have a PMD and psychologist/psychiatrist outpatient. We gave him Dr. Thompson's name and the contact info for Samantha i[place]rmingham in the [place] paperwork.   -Discussed with  the hospital course and the need for follow up. DC patient to home today.

## 2017-11-29 NOTE — PROGRESS NOTE ADULT - PROBLEM SELECTOR PROBLEM 7
Chronic heart failure, unspecified heart failure type

## 2017-11-29 NOTE — DISCHARGE NOTE ADULT - ADDITIONAL INSTRUCTIONS
To set up a therapy appointment for depression, please call [phone] To set up a therapy appointment for depression, please call [phone]. Also, set up an appointment to establish a primary care physician to see them in the next 1-2 weeks, we've given you the name/number of Dr. Thompson below.

## 2017-11-29 NOTE — PROGRESS NOTE ADULT - ASSESSMENT
Pt is a 26 YO M w/ SLE c/b myocarditis, pericarditis in 2007 on Plaquenil, Cellcept, and currently in Belimumab study (receives monthly, next dosing 12/8/17), lupus nephritis, seronegative antiphospholipid syndrome with history of R DVT/PE on coumadin p/w acute onset LLQ abdominal pain with recorded home temp 101, physical exam findings of L inguinal pain with involuntary guarding, and CT abdomen significant only for nonspecific fatty straining in LLQ suggestive of epiploic appendagitis, however physical exam findings indicate involvement of inguinal region, however CT abdomen did not suggest inguinal hernia or concern for abscess.  INR now therapeutic.  Of note, chart review from [employer] reveals that pt does not have a history of positive anticardiolipin antibody, however had an unprovoked DVT/PE in 2007/2008 and is suspected to have seronegative antiphospholipid syndrome.  Pain is currently in adequate control with percocet 5-325 Q6H PRN.  Of note, pt is opiate naive.  Pt additionally requesting to start antidepressant medication today.

## 2017-12-01 LAB
CULTURE RESULTS: SIGNIFICANT CHANGE UP
CULTURE RESULTS: SIGNIFICANT CHANGE UP
SPECIMEN SOURCE: SIGNIFICANT CHANGE UP
SPECIMEN SOURCE: SIGNIFICANT CHANGE UP

## 2017-12-08 ENCOUNTER — LABORATORY RESULT (OUTPATIENT)
Age: 27
End: 2017-12-08

## 2017-12-08 ENCOUNTER — APPOINTMENT (OUTPATIENT)
Dept: RHEUMATOLOGY | Facility: CLINIC | Age: 27
End: 2017-12-08

## 2018-01-08 ENCOUNTER — APPOINTMENT (OUTPATIENT)
Dept: RHEUMATOLOGY | Facility: CLINIC | Age: 28
End: 2018-01-08

## 2018-02-05 ENCOUNTER — APPOINTMENT (OUTPATIENT)
Dept: RHEUMATOLOGY | Facility: CLINIC | Age: 28
End: 2018-02-05

## 2018-03-05 ENCOUNTER — APPOINTMENT (OUTPATIENT)
Dept: RHEUMATOLOGY | Facility: CLINIC | Age: 28
End: 2018-03-05

## 2018-03-06 LAB — BACTERIA UR CULT: NORMAL

## 2018-03-12 LAB
BACTERIA BLD CULT: NORMAL
BACTERIA BLD CULT: NORMAL

## 2018-04-03 ENCOUNTER — APPOINTMENT (OUTPATIENT)
Dept: RHEUMATOLOGY | Facility: CLINIC | Age: 28
End: 2018-04-03

## 2018-04-30 ENCOUNTER — APPOINTMENT (OUTPATIENT)
Dept: RHEUMATOLOGY | Facility: CLINIC | Age: 28
End: 2018-04-30

## 2018-05-31 ENCOUNTER — LABORATORY RESULT (OUTPATIENT)
Age: 28
End: 2018-05-31

## 2018-05-31 ENCOUNTER — APPOINTMENT (OUTPATIENT)
Dept: RHEUMATOLOGY | Facility: CLINIC | Age: 28
End: 2018-05-31

## 2018-06-01 ENCOUNTER — MEDICATION RENEWAL (OUTPATIENT)
Age: 28
End: 2018-06-01

## 2018-06-10 ENCOUNTER — INPATIENT (INPATIENT)
Facility: HOSPITAL | Age: 28
LOS: 2 days | Discharge: NURSING, REHAB OR LONG-TERM CARE | DRG: 872 | End: 2018-06-13
Attending: HOSPITALIST | Admitting: HOSPITALIST
Payer: MEDICAID

## 2018-06-10 VITALS
OXYGEN SATURATION: 98 % | SYSTOLIC BLOOD PRESSURE: 93 MMHG | HEART RATE: 125 BPM | RESPIRATION RATE: 20 BRPM | TEMPERATURE: 100 F | DIASTOLIC BLOOD PRESSURE: 57 MMHG

## 2018-06-10 DIAGNOSIS — K52.9 NONINFECTIVE GASTROENTERITIS AND COLITIS, UNSPECIFIED: ICD-10-CM

## 2018-06-10 DIAGNOSIS — R63.8 OTHER SYMPTOMS AND SIGNS CONCERNING FOOD AND FLUID INTAKE: ICD-10-CM

## 2018-06-10 DIAGNOSIS — A41.9 SEPSIS, UNSPECIFIED ORGANISM: ICD-10-CM

## 2018-06-10 DIAGNOSIS — N17.9 ACUTE KIDNEY FAILURE, UNSPECIFIED: ICD-10-CM

## 2018-06-10 DIAGNOSIS — Z29.9 ENCOUNTER FOR PROPHYLACTIC MEASURES, UNSPECIFIED: ICD-10-CM

## 2018-06-10 DIAGNOSIS — M32.9 SYSTEMIC LUPUS ERYTHEMATOSUS, UNSPECIFIED: ICD-10-CM

## 2018-06-10 DIAGNOSIS — D68.61 ANTIPHOSPHOLIPID SYNDROME: ICD-10-CM

## 2018-06-10 LAB
ALBUMIN SERPL ELPH-MCNC: 4 G/DL — SIGNIFICANT CHANGE UP (ref 3.3–5)
ALP SERPL-CCNC: 99 U/L — SIGNIFICANT CHANGE UP (ref 40–120)
ALT FLD-CCNC: 9 U/L — LOW (ref 10–45)
ANION GAP SERPL CALC-SCNC: 14 MMOL/L — SIGNIFICANT CHANGE UP (ref 5–17)
ANION GAP SERPL CALC-SCNC: 18 MMOL/L — HIGH (ref 5–17)
APPEARANCE UR: CLEAR — SIGNIFICANT CHANGE UP
APTT BLD: 54.2 SEC — HIGH (ref 27.5–37.4)
AST SERPL-CCNC: 19 U/L — SIGNIFICANT CHANGE UP (ref 10–40)
BACTERIA # UR AUTO: ABNORMAL /HPF
BILIRUB SERPL-MCNC: 0.6 MG/DL — SIGNIFICANT CHANGE UP (ref 0.2–1.2)
BILIRUB UR-MCNC: NEGATIVE — SIGNIFICANT CHANGE UP
BUN SERPL-MCNC: 6 MG/DL — LOW (ref 7–23)
BUN SERPL-MCNC: 9 MG/DL — SIGNIFICANT CHANGE UP (ref 7–23)
C DIFF GDH STL QL: NEGATIVE — SIGNIFICANT CHANGE UP
C DIFF GDH STL QL: SIGNIFICANT CHANGE UP
CALCIUM SERPL-MCNC: 8.5 MG/DL — SIGNIFICANT CHANGE UP (ref 8.4–10.5)
CALCIUM SERPL-MCNC: 9.2 MG/DL — SIGNIFICANT CHANGE UP (ref 8.4–10.5)
CHLORIDE SERPL-SCNC: 97 MMOL/L — SIGNIFICANT CHANGE UP (ref 96–108)
CHLORIDE SERPL-SCNC: 99 MMOL/L — SIGNIFICANT CHANGE UP (ref 96–108)
CO2 SERPL-SCNC: 20 MMOL/L — LOW (ref 22–31)
CO2 SERPL-SCNC: 23 MMOL/L — SIGNIFICANT CHANGE UP (ref 22–31)
COLOR SPEC: YELLOW — SIGNIFICANT CHANGE UP
CREAT ?TM UR-MCNC: 137 MG/DL — SIGNIFICANT CHANGE UP
CREAT ?TM UR-MCNC: 157 MG/DL — SIGNIFICANT CHANGE UP
CREAT SERPL-MCNC: 1.15 MG/DL — SIGNIFICANT CHANGE UP (ref 0.5–1.3)
CREAT SERPL-MCNC: 1.42 MG/DL — HIGH (ref 0.5–1.3)
CULTURE RESULTS: SIGNIFICANT CHANGE UP
DIFF PNL FLD: ABNORMAL
EPI CELLS # UR: SIGNIFICANT CHANGE UP /HPF
GAS PNL BLDV: SIGNIFICANT CHANGE UP
GAS PNL BLDV: SIGNIFICANT CHANGE UP
GLUCOSE SERPL-MCNC: 104 MG/DL — HIGH (ref 70–99)
GLUCOSE SERPL-MCNC: 99 MG/DL — SIGNIFICANT CHANGE UP (ref 70–99)
GLUCOSE UR QL: NEGATIVE — SIGNIFICANT CHANGE UP
HCT VFR BLD CALC: 39.1 % — SIGNIFICANT CHANGE UP (ref 39–50)
HGB BLD-MCNC: 12.6 G/DL — LOW (ref 13–17)
INR BLD: 5.15 RATIO — CRITICAL HIGH (ref 0.88–1.16)
KETONES UR-MCNC: ABNORMAL
LEGIONELLA AG UR QL: NEGATIVE — SIGNIFICANT CHANGE UP
LEUKOCYTE ESTERASE UR-ACNC: NEGATIVE — SIGNIFICANT CHANGE UP
LYMPHOCYTES # BLD AUTO: 1.4 K/UL — SIGNIFICANT CHANGE UP (ref 1–3.3)
LYMPHOCYTES # BLD AUTO: 11 % — LOW (ref 13–44)
MAGNESIUM SERPL-MCNC: 1.6 MG/DL — SIGNIFICANT CHANGE UP (ref 1.6–2.6)
MCHC RBC-ENTMCNC: 26.9 PG — LOW (ref 27–34)
MCHC RBC-ENTMCNC: 32.2 GM/DL — SIGNIFICANT CHANGE UP (ref 32–36)
MCV RBC AUTO: 83.5 FL — SIGNIFICANT CHANGE UP (ref 80–100)
MONOCYTES # BLD AUTO: 0.9 K/UL — SIGNIFICANT CHANGE UP (ref 0–0.9)
MONOCYTES NFR BLD AUTO: 7 % — SIGNIFICANT CHANGE UP (ref 2–14)
NEUTROPHILS # BLD AUTO: 11.5 K/UL — HIGH (ref 1.8–7.4)
NEUTROPHILS NFR BLD AUTO: 79 % — HIGH (ref 43–77)
NITRITE UR-MCNC: NEGATIVE — SIGNIFICANT CHANGE UP
PH UR: 6.5 — SIGNIFICANT CHANGE UP (ref 5–8)
PHOSPHATE SERPL-MCNC: 1.8 MG/DL — LOW (ref 2.5–4.5)
PLATELET # BLD AUTO: 309 K/UL — SIGNIFICANT CHANGE UP (ref 150–400)
POTASSIUM SERPL-MCNC: 3.1 MMOL/L — LOW (ref 3.5–5.3)
POTASSIUM SERPL-MCNC: 3.2 MMOL/L — LOW (ref 3.5–5.3)
POTASSIUM SERPL-SCNC: 3.1 MMOL/L — LOW (ref 3.5–5.3)
POTASSIUM SERPL-SCNC: 3.2 MMOL/L — LOW (ref 3.5–5.3)
PROT ?TM UR-MCNC: 32 MG/DL — HIGH (ref 0–12)
PROT SERPL-MCNC: 8.2 G/DL — SIGNIFICANT CHANGE UP (ref 6–8.3)
PROT UR-MCNC: 150 MG/DL
PROT/CREAT UR-RTO: 0.2 RATIO — SIGNIFICANT CHANGE UP (ref 0–0.2)
PROTHROM AB SERPL-ACNC: 57.5 SEC — HIGH (ref 9.8–12.7)
RAPID RVP RESULT: SIGNIFICANT CHANGE UP
RBC # BLD: 4.68 M/UL — SIGNIFICANT CHANGE UP (ref 4.2–5.8)
RBC # FLD: 15.9 % — HIGH (ref 10.3–14.5)
RBC CASTS # UR COMP ASSIST: ABNORMAL /HPF (ref 0–2)
SODIUM SERPL-SCNC: 135 MMOL/L — SIGNIFICANT CHANGE UP (ref 135–145)
SODIUM SERPL-SCNC: 136 MMOL/L — SIGNIFICANT CHANGE UP (ref 135–145)
SODIUM UR-SCNC: 49 MMOL/L — SIGNIFICANT CHANGE UP
SP GR SPEC: 1.02 — SIGNIFICANT CHANGE UP (ref 1.01–1.02)
SPECIMEN SOURCE: SIGNIFICANT CHANGE UP
UROBILINOGEN FLD QL: NEGATIVE — SIGNIFICANT CHANGE UP
WBC # BLD: 13.9 K/UL — HIGH (ref 3.8–10.5)
WBC # FLD AUTO: 13.9 K/UL — HIGH (ref 3.8–10.5)
WBC UR QL: SIGNIFICANT CHANGE UP /HPF (ref 0–5)

## 2018-06-10 PROCEDURE — 74176 CT ABD & PELVIS W/O CONTRAST: CPT | Mod: 26

## 2018-06-10 PROCEDURE — 99285 EMERGENCY DEPT VISIT HI MDM: CPT | Mod: 25

## 2018-06-10 PROCEDURE — 99223 1ST HOSP IP/OBS HIGH 75: CPT | Mod: GC

## 2018-06-10 PROCEDURE — 71046 X-RAY EXAM CHEST 2 VIEWS: CPT | Mod: 26

## 2018-06-10 PROCEDURE — 93010 ELECTROCARDIOGRAM REPORT: CPT

## 2018-06-10 RX ORDER — MYCOPHENOLATE MOFETIL 250 MG/1
1500 CAPSULE ORAL
Qty: 0 | Refills: 0 | Status: DISCONTINUED | OUTPATIENT
Start: 2018-06-10 | End: 2018-06-10

## 2018-06-10 RX ORDER — MORPHINE SULFATE 50 MG/1
4 CAPSULE, EXTENDED RELEASE ORAL EVERY 6 HOURS
Qty: 0 | Refills: 0 | Status: DISCONTINUED | OUTPATIENT
Start: 2018-06-10 | End: 2018-06-13

## 2018-06-10 RX ORDER — MYCOPHENOLATE MOFETIL 250 MG/1
500 CAPSULE ORAL
Qty: 0 | Refills: 0 | Status: DISCONTINUED | OUTPATIENT
Start: 2018-06-10 | End: 2018-06-10

## 2018-06-10 RX ORDER — ONDANSETRON 8 MG/1
4 TABLET, FILM COATED ORAL ONCE
Qty: 0 | Refills: 0 | Status: COMPLETED | OUTPATIENT
Start: 2018-06-10 | End: 2018-06-10

## 2018-06-10 RX ORDER — CIPROFLOXACIN LACTATE 400MG/40ML
400 VIAL (ML) INTRAVENOUS EVERY 12 HOURS
Qty: 0 | Refills: 0 | Status: DISCONTINUED | OUTPATIENT
Start: 2018-06-10 | End: 2018-06-11

## 2018-06-10 RX ORDER — CIPROFLOXACIN LACTATE 400MG/40ML
400 VIAL (ML) INTRAVENOUS ONCE
Qty: 0 | Refills: 0 | Status: COMPLETED | OUTPATIENT
Start: 2018-06-10 | End: 2018-06-10

## 2018-06-10 RX ORDER — POTASSIUM PHOSPHATE, MONOBASIC POTASSIUM PHOSPHATE, DIBASIC 236; 224 MG/ML; MG/ML
15 INJECTION, SOLUTION INTRAVENOUS ONCE
Qty: 0 | Refills: 0 | Status: COMPLETED | OUTPATIENT
Start: 2018-06-10 | End: 2018-06-10

## 2018-06-10 RX ORDER — CIPROFLOXACIN LACTATE 400MG/40ML
VIAL (ML) INTRAVENOUS
Qty: 0 | Refills: 0 | Status: DISCONTINUED | OUTPATIENT
Start: 2018-06-10 | End: 2018-06-11

## 2018-06-10 RX ORDER — SODIUM CHLORIDE 9 MG/ML
1000 INJECTION INTRAMUSCULAR; INTRAVENOUS; SUBCUTANEOUS ONCE
Qty: 0 | Refills: 0 | Status: COMPLETED | OUTPATIENT
Start: 2018-06-10 | End: 2018-06-10

## 2018-06-10 RX ORDER — MORPHINE SULFATE 50 MG/1
2 CAPSULE, EXTENDED RELEASE ORAL EVERY 4 HOURS
Qty: 0 | Refills: 0 | Status: DISCONTINUED | OUTPATIENT
Start: 2018-06-10 | End: 2018-06-13

## 2018-06-10 RX ORDER — ACETAMINOPHEN 500 MG
1000 TABLET ORAL ONCE
Qty: 0 | Refills: 0 | Status: COMPLETED | OUTPATIENT
Start: 2018-06-10 | End: 2018-06-10

## 2018-06-10 RX ORDER — METRONIDAZOLE 500 MG
500 TABLET ORAL EVERY 8 HOURS
Qty: 0 | Refills: 0 | Status: DISCONTINUED | OUTPATIENT
Start: 2018-06-10 | End: 2018-06-11

## 2018-06-10 RX ORDER — CEFTRIAXONE 500 MG/1
1 INJECTION, POWDER, FOR SOLUTION INTRAMUSCULAR; INTRAVENOUS ONCE
Qty: 0 | Refills: 0 | Status: COMPLETED | OUTPATIENT
Start: 2018-06-10 | End: 2018-06-10

## 2018-06-10 RX ORDER — MORPHINE SULFATE 50 MG/1
4 CAPSULE, EXTENDED RELEASE ORAL ONCE
Qty: 0 | Refills: 0 | Status: DISCONTINUED | OUTPATIENT
Start: 2018-06-10 | End: 2018-06-10

## 2018-06-10 RX ORDER — HYDROXYCHLOROQUINE SULFATE 200 MG
200 TABLET ORAL EVERY 12 HOURS
Qty: 0 | Refills: 0 | Status: DISCONTINUED | OUTPATIENT
Start: 2018-06-10 | End: 2018-06-13

## 2018-06-10 RX ORDER — ACETAMINOPHEN 500 MG
650 TABLET ORAL EVERY 6 HOURS
Qty: 0 | Refills: 0 | Status: DISCONTINUED | OUTPATIENT
Start: 2018-06-10 | End: 2018-06-13

## 2018-06-10 RX ORDER — POTASSIUM CHLORIDE 20 MEQ
40 PACKET (EA) ORAL ONCE
Qty: 0 | Refills: 0 | Status: COMPLETED | OUTPATIENT
Start: 2018-06-10 | End: 2018-06-10

## 2018-06-10 RX ORDER — AZITHROMYCIN 500 MG/1
500 TABLET, FILM COATED ORAL ONCE
Qty: 0 | Refills: 0 | Status: COMPLETED | OUTPATIENT
Start: 2018-06-10 | End: 2018-06-10

## 2018-06-10 RX ORDER — METRONIDAZOLE 500 MG
500 TABLET ORAL ONCE
Qty: 0 | Refills: 0 | Status: COMPLETED | OUTPATIENT
Start: 2018-06-10 | End: 2018-06-10

## 2018-06-10 RX ORDER — SODIUM CHLORIDE 9 MG/ML
1000 INJECTION INTRAMUSCULAR; INTRAVENOUS; SUBCUTANEOUS
Qty: 0 | Refills: 0 | Status: DISCONTINUED | OUTPATIENT
Start: 2018-06-10 | End: 2018-06-13

## 2018-06-10 RX ADMIN — Medication 200 MILLIGRAM(S): at 22:52

## 2018-06-10 RX ADMIN — POTASSIUM PHOSPHATE, MONOBASIC POTASSIUM PHOSPHATE, DIBASIC 62.5 MILLIMOLE(S): 236; 224 INJECTION, SOLUTION INTRAVENOUS at 17:30

## 2018-06-10 RX ADMIN — ONDANSETRON 4 MILLIGRAM(S): 8 TABLET, FILM COATED ORAL at 05:53

## 2018-06-10 RX ADMIN — Medication 100 MILLIGRAM(S): at 15:22

## 2018-06-10 RX ADMIN — SODIUM CHLORIDE 1000 MILLILITER(S): 9 INJECTION INTRAMUSCULAR; INTRAVENOUS; SUBCUTANEOUS at 04:21

## 2018-06-10 RX ADMIN — Medication 200 MILLIGRAM(S): at 10:49

## 2018-06-10 RX ADMIN — MORPHINE SULFATE 4 MILLIGRAM(S): 50 CAPSULE, EXTENDED RELEASE ORAL at 12:30

## 2018-06-10 RX ADMIN — AZITHROMYCIN 250 MILLIGRAM(S): 500 TABLET, FILM COATED ORAL at 05:30

## 2018-06-10 RX ADMIN — Medication 500 MILLIGRAM(S): at 07:39

## 2018-06-10 RX ADMIN — Medication 100 MILLIGRAM(S): at 21:38

## 2018-06-10 RX ADMIN — Medication 200 MILLIGRAM(S): at 16:57

## 2018-06-10 RX ADMIN — SODIUM CHLORIDE 1000 MILLILITER(S): 9 INJECTION INTRAMUSCULAR; INTRAVENOUS; SUBCUTANEOUS at 04:22

## 2018-06-10 RX ADMIN — MORPHINE SULFATE 4 MILLIGRAM(S): 50 CAPSULE, EXTENDED RELEASE ORAL at 04:22

## 2018-06-10 RX ADMIN — MORPHINE SULFATE 4 MILLIGRAM(S): 50 CAPSULE, EXTENDED RELEASE ORAL at 18:34

## 2018-06-10 RX ADMIN — CEFTRIAXONE 100 GRAM(S): 500 INJECTION, POWDER, FOR SOLUTION INTRAMUSCULAR; INTRAVENOUS at 04:36

## 2018-06-10 RX ADMIN — Medication 400 MILLIGRAM(S): at 04:22

## 2018-06-10 RX ADMIN — MORPHINE SULFATE 4 MILLIGRAM(S): 50 CAPSULE, EXTENDED RELEASE ORAL at 12:05

## 2018-06-10 RX ADMIN — SODIUM CHLORIDE 100 MILLILITER(S): 9 INJECTION INTRAMUSCULAR; INTRAVENOUS; SUBCUTANEOUS at 10:49

## 2018-06-10 RX ADMIN — MORPHINE SULFATE 4 MILLIGRAM(S): 50 CAPSULE, EXTENDED RELEASE ORAL at 19:05

## 2018-06-10 RX ADMIN — Medication 40 MILLIEQUIVALENT(S): at 16:57

## 2018-06-10 NOTE — MEDICAL STUDENT ADULT H&P (EDUCATION) - NS MD HP STUD MEDICATIONS FT
mycophenolate, 500mg, 2x in morning 3x at night  Plaquenil, 200mg, BID  Lisinopril: 10mg, 1/day  Coremg 1/day  Sertraline: 100mg, 1/day  Warfarin, 6mg, 1/day

## 2018-06-10 NOTE — ED PROVIDER NOTE - MEDICAL DECISION MAKING DETAILS
27M hx htn lupus pe in the past presents to the ED with cough congestion fevers. Symptoms started a few days ago. Started with fever and cough. fever to 103. cough non-productive. after 2 days started developing abdominal cramping and diarrhea. non-bloody. no nausea or vomiting. decreased urine output. no skin rash. no leg swelling. doesn't feel like previous PE. On exam pt tachycardic clear lungs abd with mild luq ttp no rebound or guarding no cvat. no leg swelling normal peripheral pulses. Likely pna vs viral uri vs legionella. Unlikely PE on intraabdominal pathology. will obtain labs xray ekg legionella abx cultures and admit. Danie Dillon M.D., Attending Physician

## 2018-06-10 NOTE — H&P ADULT - NSHPLABSRESULTS_GEN_ALL_CORE
12.6   13.9  )-----------( 309      ( 10 Shad 2018 04:50 )             39.1       06-10    135  |  97  |  9   ----------------------------<  99  3.2<L>   |  20<L>  |  1.42<H>    Ca    9.2      10 Shad 2018 04:50    TPro  8.2  /  Alb  4.0  /  TBili  0.6  /  DBili  x   /  AST  19  /  ALT  9<L>  /  AlkPhos  99  06-10              Urinalysis Basic - ( 10 Shad 2018 06:28 )    Color: Yellow / Appearance: Clear / S.016 / pH: x  Gluc: x / Ketone: Trace  / Bili: Negative / Urobili: Negative   Blood: x / Protein: 150 mg/dL / Nitrite: Negative   Leuk Esterase: Negative / RBC: 5-10 /HPF / WBC 6-10 /HPF   Sq Epi: x / Non Sq Epi: OCC /HPF / Bacteria: Few /HPF        PT/INR - ( 10 Shad 2018 04:50 )   PT: 57.5 sec;   INR: 5.15 ratio         PTT - ( 10 Shad 2018 04:50 )  PTT:54.2 sec    Lactate Trend            CAPILLARY BLOOD GLUCOSE    IMPRESSION: Mild wall thickening of the transverse, descending, and   proximal sigmoid colon which is compatible with colitis of   infectious/inflammatory etiology.

## 2018-06-10 NOTE — H&P ADULT - NSHPPHYSICALEXAM_GEN_ALL_CORE
GENERAL: No acute distress, well-developed  HEAD:  Atraumatic, Normocephalic  ENT: EOMI, PERRLA, conjunctiva and sclera clear, Neck supple, No JVD, moist mucosa, no pharyngeal erythema, no tonsillar enlargement or exudate  CHEST/LUNG: Clear to auscultation bilaterally; No wheeze, equal breath sounds bilaterally   HEART: Regular rate and rhythm; No murmurs, rubs, or gallops  ABDOMEN: Soft, mild tenderness to palpation in lower quadrants, Nondistended; Bowel sounds present, no organomegaly  EXTREMITIES:  No clubbing, cyanosis, or edema  PSYCH: Nl behavior, nl affect  NEUROLOGY: AAOx3, non-focal, cranial nerves intact  SKIN: Normal color, No rashes or lesions

## 2018-06-10 NOTE — ED PROVIDER NOTE - PROGRESS NOTE DETAILS
Macho SIMON - patient with some LLQ tenderness on reeval. will ct abd pelvis non con as patient not tolerating po, and DANI. patient aware and agrees. vitals improved after fluid resus. then admit. lactate 0.8.

## 2018-06-10 NOTE — ED PROVIDER NOTE - ATTENDING CONTRIBUTION TO CARE
27M hx htn lupus pe in the past presents to the ED with cough congestion fevers. Symptoms started a few days ago. Started with fever and cough. fever to 103. cough non-productive. after 2 days started developing abdominal cramping and diarrhea. non-bloody. no nausea or vomiting. decreased urine output. no skin rash. no leg swelling. doesn't feel like previous PE. On exam pt tachycardic clear lungs abd with mild luq ttp no rebound or guarding no cvat. no leg swelling normal peripheral pulses. Likely pna vs viral uri vs legionella. Unlikely PE on intraabdominal pathology. will obtain labs xray ekg legionella abx cultures and admit.     Constitutional: + fever and chills  Eyes: No visual changes  HEENT: No throat pain  CV: No chest pain  Resp: + SOB and cough  GI: + abd pain, no nausea or vomiting+ diarrhea  : No dysuria  MSK: No musculoskeletal pain  Skin: No rash  Neuro: No headache     Constitutional: mild distress AAOx3  Eyes: PERRLA EOMI  Head: Normocephalic atraumatic  Mouth: MMM  Cardiac: tachycardic  Resp: Lungs CTAB  GI: Abd soft mild ttp in luq no rebound or guarding no cvat  Neuro: CN2-12 intact  Skin: No rashes     Danie Dillon M.D., Attending Physician

## 2018-06-10 NOTE — H&P ADULT - ASSESSMENT
26 y/o M hx SLE c/b lupus nephritis, myocarditis, DVT/PE (2012), APLS on coumadin presents with sepsis secondary to colitis.

## 2018-06-10 NOTE — MEDICAL STUDENT ADULT H&P (EDUCATION) - NS MD HP STUD RESULTS RAD FT
Chest X ray: Clear lungs  CT ab and Pel w/o contrast: Mild wall thickening of the transverse, descending, and   proximal sigmoid colon which is compatible with colitis of   infectious/inflammatory etiology.

## 2018-06-10 NOTE — H&P ADULT - PROBLEM SELECTOR PLAN 1
Patient was two day hx of abdominal pain, fever and watery diarrhea, concerning for infectious colitis bacterial vs viral. Given patient presented with severe sepsis with bandemia and DANI, with treat with antibiotics ciprofloxacin and metronidazole.   -s/p 2L IVF in ER. no significant lactate elevation. continue with maintenance IVF.   -continue with antibiotics as above  -check stool GI PCR, O&P, culture   -patient has no C Diff risk factors; denies recent abx or hospitalizations.   -follow up pending blood cultures.   -serial abdominal examinations and stool count  -morphine prn severe pain

## 2018-06-10 NOTE — MEDICAL STUDENT ADULT H&P (EDUCATION) - NS MD HP STUD ASPLAN ASSES FT
This is a 27 year old man with a PMHx of SLE, lupus nephritis, APLS who presents with upper respiratory symptoms, fever, nausea and vomiting, and diarrhea for two days. Labs significant for hypokalemia, likely 2/2 diarrhea. Elevated creatinine indicative of DNAI. Physical exam significant for hyperactive bowl sounds, LLQ tenderness, decreased skin turgor. CT abdomen and pelvis demonstrates colitis. Differential at this point includes viral gastroenteritis vs. bacterial gastroenteritis vs. undetermined IBD vs. c. diff.

## 2018-06-10 NOTE — ED ADULT TRIAGE NOTE - CHIEF COMPLAINT QUOTE
cough, sore throat, fever, n/v/d, abdominal pain  hx of lupus  last dose of tylenol yesterday morning; negative sick contacts

## 2018-06-10 NOTE — ED ADULT NURSE NOTE - OBJECTIVE STATEMENT
26 yo male pt with history lupus, pe on coumadin presents to ed complaining of cough, fevers as high as 103 at home, and congestion. pt also endorses watery diarrhea "for a couple of days, and I can't eat or drink anything because it comes right out." 104 rectal temp, md chase aware. pt complaining of generalized 8/10 pain. abd nontender and nondistended. pt states the cough is non productive, and after two days he started to have abd cramping and then the diarrhea started. pt denies blood in stool or urine. no leg swelling noted. pt placed on cardiac monitor Sinus Tach . md chase aware. cap refill brisk. radial pulses strong and equal bilaterally. skin warm dry and intact. pt non diaphoretic. pt denies headache/vision changes/n/v/dizziness/weakness/chest pain/sob/numbness/tingling/sick contacts/recent travel. breath sounds clear and equal bilaterally. md chase at bedside.

## 2018-06-10 NOTE — MEDICAL STUDENT ADULT H&P (EDUCATION) - NS MD HP STUD SOCIAL HX FT
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 He is a graduate for the [employer] and pursues photography as a hobby.

## 2018-06-10 NOTE — H&P ADULT - PROBLEM SELECTOR PLAN 2
Patient meets SIRS at admission with tachycardia, fever, and leukocytosis with suspected GI source.   -follow up pending blood and urine cultures  -c/w fluid resuscitation  -trend fever curve and hemodynamics  -Tylenol PRN fever

## 2018-06-10 NOTE — H&P ADULT - NSHPREVIEWOFSYSTEMS_GEN_ALL_CORE
REVIEW OF SYSTEMS:    CONSTITUTIONAL: Endorses fever  EYES/ENT: No visual changes;  No vertigo or throat pain   NECK: No pain or stiffness  RESPIRATORY: No cough, wheezing, hemoptysis; No shortness of breath  CARDIOVASCULAR: No chest pain or palpitations  GASTROINTESTINAL: Endorses abdominal pain and nausea. No vomiting, or hematemesis; endorses diarrhea. No melena or hematochezia.  GENITOURINARY: No dysuria, frequency or hematuria  NEUROLOGICAL: No numbness or weakness  SKIN: No itching, burning, rashes, or lesions   All other review of systems is negative unless indicated above.

## 2018-06-10 NOTE — MEDICAL STUDENT ADULT H&P (EDUCATION) - NS MD HP STUD FAM HX FT
Mother, SLE,  2/2 to complications from SLE  Father, HLD  Paternal grandmother and grandfather: HTN, T2DM

## 2018-06-10 NOTE — H&P ADULT - PROBLEM SELECTOR PLAN 5
INR currently supra-therapeutic  no signs or symptoms of bleeding or indication for reversal at this time  hold coumadin today and trend INR

## 2018-06-10 NOTE — H&P ADULT - HISTORY OF PRESENT ILLNESS
CC/HPI:     28 y/o M hx SLE c/b lupus nephritis, DVT/PE, APLS on coumadin presents with several days of fever.     Patient reports 3-4 days of high fevers at home up to 104 with chills. He reports nonproductive cough and congestion. Denies chest pain or shortness of breath, headache, dizziness, or sore throat. In the last two days, he has had lower abdominal pain which is described as crampy, intermittent and severe in intensity without radiation. He has had multiple loose, watery bowel movements over the last two days without hematochezia or melena. He has had minimal appetite and oral intake. Patient denies recent travel, sick contacts, exposures, hospitalizations or antibiotic use. Over the last one day, he noticed decrease in amount of urine which along with the continued fevers, prompted him to present to emergency room.     In the ER, Tmax was 104, -120, BP 90-110s systolic, RR 18,  on RA.   He received 2L NS, ceftriaxone, azithromycin, and metronidazole. CC/HPI:     28 y/o M hx SLE c/b lupus nephritis, myocarditis (2007) DVT/PE (2012), APLS on coumadin presents with several days of fever.     Patient reports 3-4 days of high fevers at home up to 104 with chills. He reports nonproductive cough and congestion. Denies chest pain or shortness of breath, headache, dizziness, or sore throat. In the last two days, he has had lower abdominal pain which is described as crampy, intermittent and severe in intensity without radiation. He has had multiple loose, watery bowel movements over the last two days without hematochezia or melena. He has had minimal appetite and oral intake. He ate at a new [employer] restaurant two days ago however his symptoms preceded then and no other contacts are sick with similar symptoms.  Patient denies recent travel, sick contacts, exposures, hospitalizations or antibiotic use. Over the last one day, he noticed decrease in amount of urine which along with the continued fevers, prompted him to present to emergency room.     He denies any recent changes in medications. Patient follows with Dr. Mcdonald from rheumatology. He was last hospitalized in 11/2017 for left lower quadrant pain; at that time, workup was revealing for nonspecific fatty stranding suggestive of epiploic appendicitis. At that time, abdominal exam was not significant per notes, surgical consultation was obtained and he was managed medically with pain control.     In the ER, Tmax was 104, -120, BP 90-110s systolic, RR 18,  on RA.   He received 2L NS, ceftriaxone, azithromycin, and metronidazole.

## 2018-06-10 NOTE — MEDICAL STUDENT ADULT H&P (EDUCATION) - NS MD HP STUD PMH FT
- Myocarditis / pericarditis: 2007. Required PICU admission.   - Cardiomyopathy: 2/2 myocarditis in 2007  - DVT right leg with secondary PE: 2012  - Antiphospholipid syndrome: +Lupus anticoagulant, now on Warfarin  -Lupus nephritis: 2 years ago, now in clinical trial for treatment. Stable.   - Appendicitis 2017  -MDD diagnosed in 2017. Now on Sertraline, 100mg. Partial remission.

## 2018-06-10 NOTE — MEDICAL STUDENT ADULT H&P (EDUCATION) - NS MD HP STUD PE VITALS FT
Vital Signs Last 24 Hrs  T(C): 36.6 (10 Shad 2018 09:25), Max: 40 (10 Shad 2018 04:26)  T(F): 97.8 (10 Shad 2018 09:25), Max: 104 (10 Shad 2018 04:26)  HR: 83 (10 Shad 2018 07:30) (83 - 125)  BP: 98/68 (10 Shad 2018 09:25) (93/57 - 123/84)  BP(mean): 90 (10 Shad 2018 04:26) (90 - 90)  RR: 16 (10 Shad 2018 09:25) (16 - 20)  SpO2: 95% (10 Shad 2018 09:25) (95% - 100%)

## 2018-06-10 NOTE — H&P ADULT - ATTENDING COMMENTS
26 yo male with hx listed above presents suspected sepsis 2/2 infectious diarrhea.  Other differentials include inflammatory diarrhea although less likely given acute onset of symptoms.  Labs are also significant for DANI, likely pre-renal azotemia in the setting of volume loss.  In the setting of being on immunosuppressants, pt is at higher risk for bacterial etiology of his diarrhea and is also at risk for lupus nephritis flare.  Check stool studies including C.diff as out lined above, trend BMP, renally dose medications.  C/w IVF given ongoing fluid loss from diarrhea.  Rest of plan as outlined above.

## 2018-06-10 NOTE — MEDICAL STUDENT ADULT H&P (EDUCATION) - NS MD HP STUD RESULTS LAB FT
12.6   13.9  )-----------( 309      ( 10 Shad 2018 04:50 )             39.1   06-10  135  |  97  |  9   ----------------------------<  99  3.2<L>   |  20<L>  |  1.42<H>    Ca    9.2      10 Shad 2018 04:50  TPro  8.2  /  Alb  4.0  /  TBili  0.6  /  DBili  x   /  AST  19  /  ALT  9<L>  /  AlkPhos  99  06-10  Urinalysis Basic - ( 10 Shad 2018 06:28 )  Color: Yellow / Appearance: Clear / S.016 / pH: x  Gluc: x / Ketone: Trace  / Bili: Negative / Urobili: Negative   Blood: x / Protein: 150 mg/dL / Nitrite: Negative   Leuk Esterase: Negative / RBC: 5-10 /HPF / WBC 6-10 /HPF   Sq Epi: x / Non Sq Epi: OCC /HPF / Bacteria: Few /HPF  PT/INR - ( 10 Shad 2018 04:50 )   PT: 57.5 sec;   INR: 5.15 ratio    PTT - ( 10 Shad 2018 04:50 )  PTT:54.2 sec

## 2018-06-10 NOTE — H&P ADULT - PROBLEM SELECTOR PLAN 3
suspect pre-renal etiology secondary to sepsis and relative hypoperfusion. s/p 2L IVF and maintenance fluids.   -trend BUN/creatinine  -now endorsing copious urine output  -renally dose all medications

## 2018-06-10 NOTE — MEDICAL STUDENT ADULT H&P (EDUCATION) - NS MD HP STUD ASPLAN PLAN FT
- BMPs daily  - Maintenance fluids, NS 0.9% 100mL/hour  - Cipro/Flagyl IV  - Morphine, 2mg IV, PRN   - Tylenol for fever control

## 2018-06-10 NOTE — ED PROVIDER NOTE - CARE PLAN
Principal Discharge DX:	Sepsis  Secondary Diagnosis:	Colitis  Secondary Diagnosis:	DANI (acute kidney injury)

## 2018-06-10 NOTE — MEDICAL STUDENT ADULT H&P (EDUCATION) - NS MD HP STUD HX OF PRESENT ILLNESS FT
This is a 27 year old man with a past history of SLE, lupus nephritis, antiphospholipid syndrome, and MDD who presented due to significant abdominal pain. Since Wednesday the patient has had a cough. Two days ago he also developed fevers up to 104 taken rectally, nausea, vomiting (nonbloody), diarrhea (green, watery non-bloody) and 9/10 abdominal pain. The patient notes that he has had significant difficulty with sleep 2/2 to the nausea. He denies travel, he does not report travel, he has not changed his diet, he has 3 cats and 1 dog. The patient does not know what could be causing this. He is worried that this may be a sign of a lupus flare and this is significantly distressing him.       ED course: Patient had fever in the ER up to 104. He was tachy up to 125. BP was slightly low at 93/57. Patient received two 1000mL NS bolus, Azithromycin, Cipro/Flagyl. CT performed.

## 2018-06-10 NOTE — ED ADULT NURSE REASSESSMENT NOTE - NS ED NURSE REASSESS COMMENT FT1
pt ambulated to bathroom with steady gait. urine drawn and sent to lab as per md bach order. pt complaining of abd pain states "I have a cramping feeling and I feel like I have to pass gas." md bach aware at bedside.

## 2018-06-11 DIAGNOSIS — A02.0 SALMONELLA ENTERITIS: ICD-10-CM

## 2018-06-11 LAB
-  CANDIDA ALBICANS: SIGNIFICANT CHANGE UP
-  CANDIDA GLABRATA: SIGNIFICANT CHANGE UP
-  CANDIDA KRUSEI: SIGNIFICANT CHANGE UP
-  CANDIDA PARAPSILOSIS: SIGNIFICANT CHANGE UP
-  CANDIDA TROPICALIS: SIGNIFICANT CHANGE UP
-  COAGULASE NEGATIVE STAPHYLOCOCCUS: SIGNIFICANT CHANGE UP
-  K. PNEUMONIAE GROUP: SIGNIFICANT CHANGE UP
-  KPC RESISTANCE GENE: SIGNIFICANT CHANGE UP
-  STREPTOCOCCUS SP. (NOT GRP A, B OR S PNEUMONIAE): SIGNIFICANT CHANGE UP
A BAUMANNII DNA SPEC QL NAA+PROBE: SIGNIFICANT CHANGE UP
ANION GAP SERPL CALC-SCNC: 15 MMOL/L — SIGNIFICANT CHANGE UP (ref 5–17)
BASOPHILS # BLD AUTO: 0.01 K/UL — SIGNIFICANT CHANGE UP (ref 0–0.2)
BASOPHILS NFR BLD AUTO: 0.1 % — SIGNIFICANT CHANGE UP (ref 0–2)
BUN SERPL-MCNC: 5 MG/DL — LOW (ref 7–23)
C3 SERPL-MCNC: 120 MG/DL — SIGNIFICANT CHANGE UP (ref 81–157)
C4 SERPL-MCNC: 25 MG/DL — SIGNIFICANT CHANGE UP (ref 13–39)
CALCIUM SERPL-MCNC: 8.5 MG/DL — SIGNIFICANT CHANGE UP (ref 8.4–10.5)
CHLORIDE SERPL-SCNC: 99 MMOL/L — SIGNIFICANT CHANGE UP (ref 96–108)
CO2 SERPL-SCNC: 22 MMOL/L — SIGNIFICANT CHANGE UP (ref 22–31)
CREAT SERPL-MCNC: 1.04 MG/DL — SIGNIFICANT CHANGE UP (ref 0.5–1.3)
CULTURE RESULTS: NO GROWTH — SIGNIFICANT CHANGE UP
E CLOAC COMP DNA BLD POS QL NAA+PROBE: SIGNIFICANT CHANGE UP
E COLI DNA BLD POS QL NAA+NON-PROBE: SIGNIFICANT CHANGE UP
ENTEROCOC DNA BLD POS QL NAA+NON-PROBE: SIGNIFICANT CHANGE UP
ENTEROCOC DNA BLD POS QL NAA+NON-PROBE: SIGNIFICANT CHANGE UP
EOSINOPHIL # BLD AUTO: 0.05 K/UL — SIGNIFICANT CHANGE UP (ref 0–0.5)
EOSINOPHIL NFR BLD AUTO: 0.4 % — SIGNIFICANT CHANGE UP (ref 0–6)
GLUCOSE SERPL-MCNC: 115 MG/DL — HIGH (ref 70–99)
GP B STREP DNA BLD POS QL NAA+NON-PROBE: SIGNIFICANT CHANGE UP
GRAM STN FLD: SIGNIFICANT CHANGE UP
GRAM STN FLD: SIGNIFICANT CHANGE UP
HAEM INFLU DNA BLD POS QL NAA+NON-PROBE: SIGNIFICANT CHANGE UP
HCT VFR BLD CALC: 32.7 % — LOW (ref 39–50)
HGB BLD-MCNC: 10.1 G/DL — LOW (ref 13–17)
IMM GRANULOCYTES NFR BLD AUTO: 0.4 % — SIGNIFICANT CHANGE UP (ref 0–1.5)
INR BLD: 6.47 RATIO — CRITICAL HIGH (ref 0.88–1.16)
K OXYTOCA DNA BLD POS QL NAA+NON-PROBE: SIGNIFICANT CHANGE UP
L MONOCYTOG DNA BLD POS QL NAA+NON-PROBE: SIGNIFICANT CHANGE UP
LYMPHOCYTES # BLD AUTO: 15.5 % — SIGNIFICANT CHANGE UP (ref 13–44)
LYMPHOCYTES # BLD AUTO: 2.12 K/UL — SIGNIFICANT CHANGE UP (ref 1–3.3)
MCHC RBC-ENTMCNC: 24.7 PG — LOW (ref 27–34)
MCHC RBC-ENTMCNC: 30.9 GM/DL — LOW (ref 32–36)
MCV RBC AUTO: 80 FL — SIGNIFICANT CHANGE UP (ref 80–100)
METHOD TYPE: SIGNIFICANT CHANGE UP
MONOCYTES # BLD AUTO: 1.48 K/UL — HIGH (ref 0–0.9)
MONOCYTES NFR BLD AUTO: 10.8 % — SIGNIFICANT CHANGE UP (ref 2–14)
MRSA SPEC QL CULT: SIGNIFICANT CHANGE UP
MSSA DNA SPEC QL NAA+PROBE: SIGNIFICANT CHANGE UP
N MEN ISLT CULT: SIGNIFICANT CHANGE UP
NEUTROPHILS # BLD AUTO: 9.98 K/UL — HIGH (ref 1.8–7.4)
NEUTROPHILS NFR BLD AUTO: 72.8 % — SIGNIFICANT CHANGE UP (ref 43–77)
P AERUGINOSA DNA BLD POS NAA+NON-PROBE: SIGNIFICANT CHANGE UP
PLATELET # BLD AUTO: 258 K/UL — SIGNIFICANT CHANGE UP (ref 150–400)
POTASSIUM SERPL-MCNC: 3.1 MMOL/L — LOW (ref 3.5–5.3)
POTASSIUM SERPL-SCNC: 3.1 MMOL/L — LOW (ref 3.5–5.3)
PROTHROM AB SERPL-ACNC: 76 SEC — HIGH (ref 10–13.1)
RBC # BLD: 4.09 M/UL — LOW (ref 4.2–5.8)
RBC # FLD: 17.5 % — HIGH (ref 10.3–14.5)
S MARCESCENS DNA BLD POS NAA+NON-PROBE: SIGNIFICANT CHANGE UP
S PNEUM DNA BLD POS QL NAA+NON-PROBE: SIGNIFICANT CHANGE UP
S PYO DNA BLD POS QL NAA+NON-PROBE: SIGNIFICANT CHANGE UP
SODIUM SERPL-SCNC: 136 MMOL/L — SIGNIFICANT CHANGE UP (ref 135–145)
SPECIMEN SOURCE: SIGNIFICANT CHANGE UP
SPECIMEN SOURCE: SIGNIFICANT CHANGE UP
WBC # BLD: 13.69 K/UL — HIGH (ref 3.8–10.5)
WBC # FLD AUTO: 13.69 K/UL — HIGH (ref 3.8–10.5)

## 2018-06-11 PROCEDURE — 99223 1ST HOSP IP/OBS HIGH 75: CPT | Mod: GC

## 2018-06-11 PROCEDURE — 99233 SBSQ HOSP IP/OBS HIGH 50: CPT | Mod: GC

## 2018-06-11 RX ORDER — DAPTOMYCIN 500 MG/10ML
560 INJECTION, POWDER, LYOPHILIZED, FOR SOLUTION INTRAVENOUS EVERY 24 HOURS
Qty: 0 | Refills: 0 | Status: DISCONTINUED | OUTPATIENT
Start: 2018-06-12 | End: 2018-06-13

## 2018-06-11 RX ORDER — DAPTOMYCIN 500 MG/10ML
INJECTION, POWDER, LYOPHILIZED, FOR SOLUTION INTRAVENOUS
Qty: 0 | Refills: 0 | Status: DISCONTINUED | OUTPATIENT
Start: 2018-06-11 | End: 2018-06-11

## 2018-06-11 RX ORDER — PIPERACILLIN AND TAZOBACTAM 4; .5 G/20ML; G/20ML
3.38 INJECTION, POWDER, LYOPHILIZED, FOR SOLUTION INTRAVENOUS EVERY 8 HOURS
Qty: 0 | Refills: 0 | Status: DISCONTINUED | OUTPATIENT
Start: 2018-06-11 | End: 2018-06-11

## 2018-06-11 RX ORDER — CEFTRIAXONE 500 MG/1
2 INJECTION, POWDER, FOR SOLUTION INTRAMUSCULAR; INTRAVENOUS EVERY 24 HOURS
Qty: 0 | Refills: 0 | Status: DISCONTINUED | OUTPATIENT
Start: 2018-06-11 | End: 2018-06-11

## 2018-06-11 RX ORDER — CEFTRIAXONE 500 MG/1
2 INJECTION, POWDER, FOR SOLUTION INTRAMUSCULAR; INTRAVENOUS EVERY 24 HOURS
Qty: 0 | Refills: 0 | Status: DISCONTINUED | OUTPATIENT
Start: 2018-06-11 | End: 2018-06-12

## 2018-06-11 RX ORDER — DAPTOMYCIN 500 MG/10ML
410 INJECTION, POWDER, LYOPHILIZED, FOR SOLUTION INTRAVENOUS ONCE
Qty: 0 | Refills: 0 | Status: COMPLETED | OUTPATIENT
Start: 2018-06-11 | End: 2018-06-11

## 2018-06-11 RX ORDER — POTASSIUM CHLORIDE 20 MEQ
40 PACKET (EA) ORAL EVERY 4 HOURS
Qty: 0 | Refills: 0 | Status: COMPLETED | OUTPATIENT
Start: 2018-06-11 | End: 2018-06-11

## 2018-06-11 RX ORDER — DAPTOMYCIN 500 MG/10ML
410 INJECTION, POWDER, LYOPHILIZED, FOR SOLUTION INTRAVENOUS ONCE
Qty: 0 | Refills: 0 | Status: DISCONTINUED | OUTPATIENT
Start: 2018-06-11 | End: 2018-06-11

## 2018-06-11 RX ADMIN — Medication 200 MILLIGRAM(S): at 17:47

## 2018-06-11 RX ADMIN — Medication 40 MILLIEQUIVALENT(S): at 13:57

## 2018-06-11 RX ADMIN — CEFTRIAXONE 100 GRAM(S): 500 INJECTION, POWDER, FOR SOLUTION INTRAMUSCULAR; INTRAVENOUS at 14:29

## 2018-06-11 RX ADMIN — Medication 100 MILLIGRAM(S): at 06:07

## 2018-06-11 RX ADMIN — Medication 200 MILLIGRAM(S): at 05:01

## 2018-06-11 RX ADMIN — SODIUM CHLORIDE 100 MILLILITER(S): 9 INJECTION INTRAMUSCULAR; INTRAVENOUS; SUBCUTANEOUS at 01:19

## 2018-06-11 RX ADMIN — Medication 40 MILLIEQUIVALENT(S): at 08:40

## 2018-06-11 RX ADMIN — DAPTOMYCIN 116.4 MILLIGRAM(S): 500 INJECTION, POWDER, LYOPHILIZED, FOR SOLUTION INTRAVENOUS at 13:58

## 2018-06-11 RX ADMIN — Medication 650 MILLIGRAM(S): at 09:56

## 2018-06-11 RX ADMIN — SODIUM CHLORIDE 100 MILLILITER(S): 9 INJECTION INTRAMUSCULAR; INTRAVENOUS; SUBCUTANEOUS at 13:58

## 2018-06-11 RX ADMIN — Medication 650 MILLIGRAM(S): at 01:16

## 2018-06-11 RX ADMIN — Medication 200 MILLIGRAM(S): at 05:03

## 2018-06-11 NOTE — CONSULT NOTE ADULT - SUBJECTIVE AND OBJECTIVE BOX
HPI:  CC/HPI:     28 y/o M hx SLE c/b lupus nephritis, myocarditis (2007) DVT/PE (2012), APLS on coumadin presents with several days of fever.     Patient reports 3-4 days of high fevers at home up to 104 with chills. He reports nonproductive cough and congestion. Denies chest pain or shortness of breath, headache, dizziness, or sore throat. In the last two days, he has had lower abdominal pain which is described as crampy, intermittent and severe in intensity without radiation. He has had multiple loose, watery bowel movements over the last two days without hematochezia or melena. He has had minimal appetite and oral intake. He ate at a new [employer] restaurant two days ago however his symptoms preceded then and no other contacts are sick with similar symptoms.  Patient denies recent travel, sick contacts, exposures, hospitalizations or antibiotic use. Over the last one day, he noticed decrease in amount of urine which along with the continued fevers, prompted him to present to emergency room.     He denies any recent changes in medications. Patient follows with Dr. Mcdonald from rheumatology. He was last hospitalized in 11/2017 for left lower quadrant pain; at that time, workup was revealing for nonspecific fatty stranding suggestive of epiploic appendicitis. At that time, abdominal exam was not significant per notes, surgical consultation was obtained and he was managed medically with pain control.       PAST MEDICAL & SURGICAL HISTORY:  Systemic lupus  Myocarditis  DVT (deep venous thrombosis)  Lupus  No significant past surgical history      Allergies    Vancomycin Hydrochloride (Pruritus; Rash; Hives)    Intolerances        ANTIMICROBIALS:  ciprofloxacin   IVPB 400 every 12 hours  ciprofloxacin   IVPB    hydroxychloroquine 200 every 12 hours  metroNIDAZOLE  IVPB 500 every 8 hours      OTHER MEDS:  acetaminophen   Tablet 650 milliGRAM(s) Oral every 6 hours PRN  morphine  - Injectable 2 milliGRAM(s) IV Push every 4 hours PRN  morphine  - Injectable 4 milliGRAM(s) IV Push every 6 hours PRN  potassium chloride    Tablet ER 40 milliEquivalent(s) Oral every 4 hours  sodium chloride 0.9%. 1000 milliLiter(s) IV Continuous <Continuous>      SOCIAL HISTORY:    Marital Status:  (   )    (x  ) Single    (   )    (  )   Occupation:   Lives with: (  ) alone  (  ) children   (  ) spouse   (  ) parents  ( x ) other    Substance Use (street drugs): (  x) never used  (  ) other:  Tobacco Usage:  ( x ) never smoked   (   ) former smoker   (   ) current smoker  (     ) pack year  (        ) last cigarette date  Alcohol Usage: Social EtOH    FAMILY HISTORY:  Family history of systemic lupus erythematosus (SLE) in mother (Mother)      ROS:  Unobtainable because:   All other systems negative     Constitutional:  fever, no chills, no weight loss, no night sweats  HEENT:  no vision changes, no sore throat, no rhinorrhea  Cardiovascular:  no chest pain, no palpitation  Respiratory:  no SOB,  cough  GI:   abd pain, no vomiting,  diarrhea  urinary: no dysuria, no hematuria, no flank pain  : no vaginal  discharge or bleeding  musculoskeletal:  no joint pain, no joint swelling  skin:  no rash  neurology:  no headache, no seizure, no change in mental status  psych: no anxiety, no depression     Physical Exam:    General:    NAD, non toxic, A&O x3  HEENT:   no oropharyngeal lesions, no LAD, neck supple  Cardio:    regular S1,S2, no murmur  Respiratory:   clear b/l, no wheezing  abd:   soft, BS +, not tender, no hepatosplenomegaly  :     no CVAT, no spurapubic tenderness, no lzao  Musculoskeletal : no joint swelling, no edema  Skin:    no rash  vascular: no lines, normal pulses  Neurologic:     no focal deficits  psych: normal affect, no suicidal ideation      Drug Dosing Weight  Height (cm): 172.7 (10 Shad 2018 10:28)  Weight (kg): 68.5 (10 Shad 2018 10:28)  BMI (kg/m2): 23 (10 Shad 2018 10:28)  BSA (m2): 1.81 (10 Shad 2018 10:28)    Vital Signs Last 24 Hrs  T(F): 102.6 (06-11-18 @ 09:50), Max: 104 (06-10-18 @ 04:26)    Vital Signs Last 24 Hrs  HR: 106 (06-11-18 @ 09:50) (82 - 106)  BP: 104/58 (06-11-18 @ 09:50) (104/58 - 118/76)  RR: 18 (06-11-18 @ 09:50)  SpO2: 98% (06-11-18 @ 09:50) (96% - 98%)  Wt(kg): --                          10.1   13.69 )-----------( 258      ( 11 Jun 2018 08:09 )             32.7       06-11    136  |  99  |  5<L>  ----------------------------<  115<H>  3.1<L>   |  22  |  1.04    Ca    8.5      11 Jun 2018 07:07  Phos  1.8     06-10  Mg     1.6     06-10    TPro  8.2  /  Alb  4.0  /  TBili  0.6  /  DBili  x   /  AST  19  /  ALT  9<L>  /  AlkPhos  99  06-10      Urinalysis Basic - ( 10 Shad 2018 06:28 )  ua-negative 	      MICROBIOLOGY:  v  .Stool Feces  06-10-18   Enteropathogenic E. coli (EPEC)  Salmonella species  DETECTED by PCR  =  Blood Blood-Peripheral  06-10-18   Growth in aerobic bottle: Gram Positive Cocci in Clusters         Rapid RVP Result: NotDetec     Clostridium difficile GDH Toxins A&amp;B, EIA:   Negative (06-10-18 @ 17:18)      RADIOLOGY:  < from: CT Abdomen and Pelvis No Cont (06.10.18 @ 06:34) >  IMPRESSION: Mild wall thickening of the transverse, descending, and   proximal sigmoid colon which is compatible with colitis of   infectious/inflammatory etiology.      < from: Xray Chest 2 Views PA/Lat (06.10.18 @ 04:51) >  FINDINGS:     No focal consolidations, pleural effusions, or pneumothorax.  The cardiomediastinal silhouette is unremarkable.  The visualized osseous structures demonstrate no acute pathology.    IMPRESSION:  Clear lungs. HPI:  28 y/o M hx SLE c/b lupus nephritis, myocarditis (2007) DVT/PE (2012), APLS on coumadin presents with several days of fever.     Patient reports 3-4 days of high fevers at home up to 104 with chills. He reports nonproductive cough and congestion. Denies chest pain or shortness of breath, headache, dizziness, or sore throat. In the last two days, he has had lower abdominal pain which is described as crampy, intermittent and severe in intensity without radiation. He has had multiple loose, watery bowel movements over the last two days without hematochezia or melena. He has had minimal appetite and oral intake. He ate at a new [employer] restaurant two days ago however his symptoms preceded then and no other contacts are sick with similar symptoms.  Patient denies recent travel, sick contacts, exposures, hospitalizations or antibiotic use. Over the last one day, he noticed decrease in amount of urine which along with the continued fevers, prompted him to present to emergency room.   The ID consult is being called for bacteremia and diarrhea.     PAST MEDICAL & SURGICAL HISTORY:  Systemic lupus  Myocarditis  DVT (deep venous thrombosis)  Lupus  No significant past surgical history      Allergies    Vancomycin Hydrochloride (Pruritus; Rash; Hives)    Intolerances        ANTIMICROBIALS:  ciprofloxacin   IVPB 400 every 12 hours  ciprofloxacin   IVPB    hydroxychloroquine 200 every 12 hours  metroNIDAZOLE  IVPB 500 every 8 hours      OTHER MEDS:  acetaminophen   Tablet 650 milliGRAM(s) Oral every 6 hours PRN  morphine  - Injectable 2 milliGRAM(s) IV Push every 4 hours PRN  morphine  - Injectable 4 milliGRAM(s) IV Push every 6 hours PRN  potassium chloride    Tablet ER 40 milliEquivalent(s) Oral every 4 hours  sodium chloride 0.9%. 1000 milliLiter(s) IV Continuous <Continuous>      SOCIAL HISTORY:    Marital Status:  (   )    (x  ) Single    (   )    (  )   Occupation:   Lives with: (  ) alone  (  ) children   (  ) spouse   (  ) parents  ( x ) other    Substance Use (street drugs): (  x) never used  (  ) other:  Tobacco Usage:  ( x ) never smoked   (   ) former smoker   (   ) current smoker  (     ) pack year  (        ) last cigarette date  Alcohol Usage: Social EtOH    FAMILY HISTORY:  Family history of systemic lupus erythematosus (SLE) in mother (Mother)      ROS:  Unobtainable because:   All other systems negative     Constitutional:  fever, no chills, no weight loss, no night sweats  HEENT:  no vision changes, no sore throat, no rhinorrhea  Cardiovascular:  no chest pain, no palpitation  Respiratory:  no SOB,  cough  GI:   abd pain, no vomiting,  diarrhea  urinary: no dysuria, no hematuria, no flank pain  : no vaginal  discharge or bleeding  musculoskeletal:  no joint pain, no joint swelling  skin:  no rash  neurology:  no headache, no seizure, no change in mental status  psych: no anxiety, no depression     Physical Exam:    General:    NAD, non toxic, A&O x3  HEENT:   no oropharyngeal lesions, no LAD, neck supple  Cardio:    regular S1,S2, no murmur  Respiratory:   clear b/l, no wheezing  abd:   soft, BS +, not tender, no hepatosplenomegaly  :     no CVAT, no spurapubic tenderness, no lazo  Musculoskeletal : no joint swelling, no edema  Skin:    no rash  vascular: no lines, normal pulses  Neurologic:     no focal deficits  psych: normal affect, no suicidal ideation      Drug Dosing Weight  Height (cm): 172.7 (10 Shad 2018 10:28)  Weight (kg): 68.5 (10 Shad 2018 10:28)  BMI (kg/m2): 23 (10 Shad 2018 10:28)  BSA (m2): 1.81 (10 Shad 2018 10:28)    Vital Signs Last 24 Hrs  T(F): 102.6 (06-11-18 @ 09:50), Max: 104 (06-10-18 @ 04:26)    Vital Signs Last 24 Hrs  HR: 106 (06-11-18 @ 09:50) (82 - 106)  BP: 104/58 (06-11-18 @ 09:50) (104/58 - 118/76)  RR: 18 (06-11-18 @ 09:50)  SpO2: 98% (06-11-18 @ 09:50) (96% - 98%)  Wt(kg): --                          10.1   13.69 )-----------( 258      ( 11 Jun 2018 08:09 )             32.7       06-11    136  |  99  |  5<L>  ----------------------------<  115<H>  3.1<L>   |  22  |  1.04    Ca    8.5      11 Jun 2018 07:07  Phos  1.8     06-10  Mg     1.6     06-10    TPro  8.2  /  Alb  4.0  /  TBili  0.6  /  DBili  x   /  AST  19  /  ALT  9<L>  /  AlkPhos  99  06-10      Urinalysis Basic - ( 10 Shad 2018 06:28 )  ua-negative 	      MICROBIOLOGY:  v  .Stool Feces  06-10-18   Enteropathogenic E. coli (EPEC)  Salmonella species  DETECTED by PCR  =  Blood Blood-Peripheral  06-10-18   Growth in aerobic bottle: Gram Positive Cocci in Clusters         Rapid RVP Result: NotDetec     Clostridium difficile GDH Toxins A&amp;B, EIA:   Negative (06-10-18 @ 17:18)      RADIOLOGY:  < from: CT Abdomen and Pelvis No Cont (06.10.18 @ 06:34) >  IMPRESSION: Mild wall thickening of the transverse, descending, and   proximal sigmoid colon which is compatible with colitis of   infectious/inflammatory etiology.      < from: Xray Chest 2 Views PA/Lat (06.10.18 @ 04:51) >  FINDINGS:     No focal consolidations, pleural effusions, or pneumothorax.  The cardiomediastinal silhouette is unremarkable.  The visualized osseous structures demonstrate no acute pathology.    IMPRESSION:  Clear lungs. HPI:  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), APLS on mycophenolate and Plaquenil p/w fever, chills, abd pain and non bloody diarrhea, WBC was 13.9 and CT abd showed colitis of transverse, descending and sigmoid, he was started on cipro and flagyl but blood culture came back with 4/4 GPC in clusters, not identified by PCR and stool PCR showed salmonella sp and EPEC      PAST MEDICAL & SURGICAL HISTORY:  Systemic lupus  Myocarditis  DVT (deep venous thrombosis)  Lupus  No significant past surgical history      Allergies    Vancomycin Hydrochloride (Pruritus; Rash; Hives)    Intolerances        ANTIMICROBIALS:  ciprofloxacin   IVPB 400 every 12 hours  ciprofloxacin   IVPB    hydroxychloroquine 200 every 12 hours  metroNIDAZOLE  IVPB 500 every 8 hours      OTHER MEDS:  acetaminophen   Tablet 650 milliGRAM(s) Oral every 6 hours PRN  morphine  - Injectable 2 milliGRAM(s) IV Push every 4 hours PRN  morphine  - Injectable 4 milliGRAM(s) IV Push every 6 hours PRN  potassium chloride    Tablet ER 40 milliEquivalent(s) Oral every 4 hours  sodium chloride 0.9%. 1000 milliLiter(s) IV Continuous <Continuous>      SOCIAL HISTORY:    single, no smoking, alcohol or drug abuse    FAMILY HISTORY:  Family history of systemic lupus erythematosus (SLE) in mother (Mother)      ROS:     All other systems negative     Constitutional:  +fever, no chills, no weight loss, no night sweats  HEENT:  no vision changes, no sore throat, no rhinorrhea  Cardiovascular:  no chest pain, no palpitation  Respiratory:  no SOB,  cough  GI:   +abd pain, no vomiting,  +diarrhea  urinary: no dysuria, no hematuria, no flank pain  : no vaginal  discharge or bleeding  musculoskeletal:  no joint pain, no joint swelling  skin:  no rash  neurology:  no headache, no seizure, no change in mental status  psych: no anxiety, no depression     Physical Exam:    General:    NAD, non toxic, A&O x3  HEENT:   no oropharyngeal lesions, no LAD, neck supple  Cardio:    regular S1,S2, no murmur  Respiratory:   clear b/l, no wheezing  abd:   soft, BS +, slight diffuse tenderness  :     no CVAT, no suprapubic tenderness, no lazo  Musculoskeletal : no joint swelling, no edema  Skin:    no rash  vascular: no lines, normal pulses  Neurologic:     no focal deficits  psych: normal affect, no suicidal ideation      Drug Dosing Weight  Height (cm): 172.7 (10 Shad 2018 10:28)  Weight (kg): 68.5 (10 Shad 2018 10:28)  BMI (kg/m2): 23 (10 Shad 2018 10:28)  BSA (m2): 1.81 (10 Shad 2018 10:28)    Vital Signs Last 24 Hrs  T(F): 102.6 (06-11-18 @ 09:50), Max: 104 (06-10-18 @ 04:26)    Vital Signs Last 24 Hrs  HR: 106 (06-11-18 @ 09:50) (82 - 106)  BP: 104/58 (06-11-18 @ 09:50) (104/58 - 118/76)  RR: 18 (06-11-18 @ 09:50)  SpO2: 98% (06-11-18 @ 09:50) (96% - 98%)  Wt(kg): --                          10.1   13.69 )-----------( 258      ( 11 Jun 2018 08:09 )             32.7       06-11    136  |  99  |  5<L>  ----------------------------<  115<H>  3.1<L>   |  22  |  1.04    Ca    8.5      11 Jun 2018 07:07  Phos  1.8     06-10  Mg     1.6     06-10    TPro  8.2  /  Alb  4.0  /  TBili  0.6  /  DBili  x   /  AST  19  /  ALT  9<L>  /  AlkPhos  99  06-10      Urinalysis Basic - ( 10 Shad 2018 06:28 )  ua-negative 	      MICROBIOLOGY:  v  .Stool Feces  06-10-18   Enteropathogenic E. coli (EPEC)  Salmonella species  DETECTED by PCR  =  Blood Blood-Peripheral  06-10-18   Growth in aerobic bottle: Gram Positive Cocci in Clusters         Rapid RVP Result: NotDetec     Clostridium difficile GDH Toxins A&amp;B, EIA:   Negative (06-10-18 @ 17:18)      RADIOLOGY:  < from: CT Abdomen and Pelvis No Cont (06.10.18 @ 06:34) >  IMPRESSION: Mild wall thickening of the transverse, descending, and   proximal sigmoid colon which is compatible with colitis of   infectious/inflammatory etiology.      < from: Xray Chest 2 Views PA/Lat (06.10.18 @ 04:51) >  FINDINGS:     No focal consolidations, pleural effusions, or pneumothorax.  The cardiomediastinal silhouette is unremarkable.  The visualized osseous structures demonstrate no acute pathology.    IMPRESSION:  Clear lungs.

## 2018-06-11 NOTE — PROVIDER CONTACT NOTE (CRITICAL VALUE NOTIFICATION) - ACTION/TREATMENT ORDERED:
continue plan of care. patient on Metronidazole and Ciprofloxacin as ordered. will continue to monitor.

## 2018-06-11 NOTE — PROGRESS NOTE ADULT - ATTENDING COMMENTS
cont cipro flagyl, gpc bacteremia, PCR common organisms not detected, unclear etiology. repeat bcx. f/u ID c/s   hold coumadin/cellcept for now

## 2018-06-11 NOTE — PROGRESS NOTE ADULT - PROBLEM SELECTOR PLAN 1
Patient continues to have abdominal pain, fever and watery diarrhea. Stool cultures returned positive for Salmonella and EPEC. Currently on Day 2 of Cipro/Flagyl. Patient was febrile overnight and repeat blood cultures collected at that time.  -s/p 2L IVF in ER. no significant lactate elevation. continue with maintenance IVF.   -continue with antibiotics as above  -stool culture positive for Salmonella  -C Diff negative   -follow up pending blood cultures.   -serial abdominal examinations and stool count  -morphine prn severe pain

## 2018-06-11 NOTE — PROVIDER CONTACT NOTE (CRITICAL VALUE NOTIFICATION) - TEST AND RESULT REPORTED:
bc from 6/10: growth in aerobic bottle Growth in aerobic bottle: Gram Positive Cocci in Clusters   and INR 6.47

## 2018-06-11 NOTE — MEDICAL STUDENT PROGRESS NOTE(EDUCATION) - SUBJECTIVE AND OBJECTIVE BOX
S: Patient notes he had diarrhea 10 times last night. No blood. He also endorses bad cramping in his lower, middle abdomen with 7/10 pain. He slept poorly, around 2 hours total. He also complained of left-sided, throbbing 7/10. He has had these headaches before. He had a fever which was treated with Tylenol overnight. Finally, he also had to have his IV line changed.     O:   Vital Signs Last 24 Hrs  T(C): 36.9 (11 Jun 2018 04:56), Max: 39.1 (11 Jun 2018 01:06)  T(F): 98.5 (11 Jun 2018 04:56), Max: 102.3 (11 Jun 2018 01:06)  HR: 89 (11 Jun 2018 04:56) (82 - 89)  BP: 114/77 (11 Jun 2018 04:56) (112/65 - 118/76)  BP(mean): --  RR: 18 (11 Jun 2018 04:56) (16 - 18)  SpO2: 98% (11 Jun 2018 04:56) (96% - 98%)    PE:   Gen: Patient is resting on his side, appears fatigued  HEENT: Non erythematous pharynx, normal sclera, moist mucus membranes  Neck: No adenopathy  Card: S1, S2, no murmurs, rubs or gallops  Pulm: CTABL  GI: tenderness LLQ (better as compared to yesterday) 5/10. S: Patient notes he had diarrhea 10 times last night. No blood. He also endorses bad cramping in his lower, middle abdomen with 7/10 pain. He slept poorly, around 2 hours total. He also complained of left-sided, throbbing headache 7/10. He has had these headaches before. He had a fever which was treated with Tylenol overnight. Finally, he also had to have his IV line changed.     O:   Vital Signs Last 24 Hrs  T(C): 36.9 (11 Jun 2018 04:56), Max: 39.1 (11 Jun 2018 01:06)  T(F): 98.5 (11 Jun 2018 04:56), Max: 102.3 (11 Jun 2018 01:06)  HR: 89 (11 Jun 2018 04:56) (82 - 89)  BP: 114/77 (11 Jun 2018 04:56) (112/65 - 118/76)  BP(mean): --  RR: 18 (11 Jun 2018 04:56) (16 - 18)  SpO2: 98% (11 Jun 2018 04:56) (96% - 98%)    PE:   Gen: Patient is resting on his side, appears fatigued  HEENT: Non erythematous pharynx, normal sclera, moist mucus membranes  Neck: No adenopathy  Card: S1, S2, no murmurs, rubs or gallops  Pulm: CTABL  GI: tenderness LLQ (better as compared to yesterday) 5/10.

## 2018-06-11 NOTE — CONSULT NOTE ADULT - SUBJECTIVE AND OBJECTIVE BOX
TODD BENNETT  [id number]    HISTORY OF PRESENT ILLNESS:    28 yo M with hx of SLE (dx  fatigue serositis, myocarditis, malar rash, polyarthritis, alopecia and Raynauds), LN  IV/V with no chronicity (2017)   being manage with cellcept 3g daily with HCQ, DVT/PE chronically anticoagulated admitted for abd pain and diarrhea x 2 days found to have colitis on CT abd.  Pt has been managed with broad spectrum abx, still with abd pain and diarrhea.  Denies any blood  Able to lissa liq diet but otherwise no alopecia, oral ulcers, chest pain/sob, LAD, or joint pain.    Pt unable to recall how he got the infection and no one else inthe family with such symtpoms of abd pain/diarrhea.    PAST MEDICAL & SURGICAL HISTORY:  Systemic lupus  Myocarditis  DVT (deep venous thrombosis)  Lupus  No significant past surgical history      Review of Systems:  Gen:  No fevers/chills, weight loss  HEENT: No blurry vision, no difficulty swallowing  CVS: No chest pain/palpitations  Resp: No SOB/wheezing  GI: No N/V/C/D/abdominal pain  MSK: no joint pain  Skin: No new rashes  Neuro: No headaches    MEDICATIONS  (STANDING):  cefTRIAXone   IVPB 2 Gram(s) IV Intermittent every 24 hours  hydroxychloroquine 200 milliGRAM(s) Oral every 12 hours  sodium chloride 0.9%. 1000 milliLiter(s) (100 mL/Hr) IV Continuous <Continuous>    MEDICATIONS  (PRN):  acetaminophen   Tablet 650 milliGRAM(s) Oral every 6 hours PRN For Temp greater than 38 C (100.4 F)  morphine  - Injectable 2 milliGRAM(s) IV Push every 4 hours PRN Moderate Pain (4 - 6)  morphine  - Injectable 4 milliGRAM(s) IV Push every 6 hours PRN Severe Pain (7 - 10)      Allergies    Vancomycin Hydrochloride (Pruritus; Rash; Hives)    Intolerances      FAMILY HISTORY:  Family history of systemic lupus erythematosus (SLE) in mother (Mother)      Vital Signs Last 24 Hrs  T(C): 36.7 (2018 13:07), Max: 39.2 (2018 09:50)  T(F): 98 (2018 13:07), Max: 102.6 (2018 09:50)  HR: 88 (2018 13:07) (82 - 106)  BP: 110/77 (2018 13:07) (104/58 - 118/76)  BP(mean): --  RR: 18 (2018 13:07) (16 - 18)  SpO2: 98% (2018 13:07) (96% - 98%)    Daily     Daily     Physical Exam:  General: No apparent distress  HEENT: EOMI, MMM  CVS: +S1/S2, RRR  Resp: CTA b/l  GI: Soft, NT/ND +BS  MSK: no synovitis, FROM, MS 5/5 in UE and LE b/l  Neuro: AAOx3  Skin: no rashe    LABS:                        10.1   13.69 )-----------( 258      ( 2018 08:09 )             32.7     06-11    136  |  99  |  5<L>  ----------------------------<  115<H>  3.1<L>   |  22  |  1.04    Ca    8.5      2018 07:07  Phos  1.8     06-10  Mg     1.6     06-10    TPro  8.2  /  Alb  4.0  /  TBili  0.6  /  DBili  x   /  AST  19  /  ALT  9<L>  /  AlkPhos  99  06-10    PT/INR - ( 2018 08:07 )   PT: 76.0 sec;   INR: 6.47 ratio         PTT - ( 10 Shad 2018 04:50 )  PTT:54.2 sec  Urinalysis Basic - ( 10 Shad 2018 06:28 )    Color: Yellow / Appearance: Clear / S.016 / pH: x  Gluc: x / Ketone: Trace  / Bili: Negative / Urobili: Negative   Blood: x / Protein: 150 mg/dL / Nitrite: Negative   Leuk Esterase: Negative / RBC: 5-10 /HPF / WBC 6-10 /HPF   Sq Epi: x / Non Sq Epi: OCC /HPF / Bacteria: Few /HPF    Culture Results:   Enteropathogenic E. coli (EPEC)  Salmonella species  DETECTED by PCR  *******Please Note:*******  GI panel PCR evaluates for:  Campylobacter, Plesiomonas shigelloides, Salmonella,  Vibrio, Yersinia enterocolitica, Enteroaggregative  Escherichia coli (EAEC), Enteropathogenic E.coli (EPEC),  Enterotoxigenic E. coli (ETEC) lt/st, Shiga-like  toxin-producing E. coli (STEC) stx1/stx2,  Shigella/ Enteroinvasive E. coli (EIEC), Cryptosporidium,  Cyclospora cayetanensis, Entamoeba histolytica,  Giardialamblia, Adenovirus F 40/41, Astrovirus,  Norovirus GI/GII, Rotavirus A, Sapovirus (06.10.18 @ 21:45)    Culture - Blood (06.10.18 @ 08:29)    Gram Stain:   Growth in aerobic bottle: Gram Positive Cocci in Clusters    Specimen Source: .Blood Blood-Peripheral    Culture Results:   Growth in aerobic bottle: Gram Positive Cocci in Clusters          RADIOLOGY & ADDITIONAL STUDIES:  < from: CT Abdomen and Pelvis No Cont (06.10.18 @ 06:34) >  FINDINGS:    LOWER CHEST: Right lower lobe subsegmental atelectasis.    LIVER: Within normal limits.  BILE DUCTS: Normal caliber.  GALLBLADDER: Within normal limits.  SPLEEN: Within normal limits.  PANCREAS: Within normal limits.  ADRENALS: Within normal limits.  KIDNEYS/URETERS: Within normal limits.    BLADDER: Within normal limits.  REPRODUCTIVE ORGANS: The prostate is within normal limits.    BOWEL: Mild wall thickening of the transverse, descending, and proximal   sigmoid colon. No bowel obstruction. Appendix is normal.  PERITONEUM: No ascites.  VESSELS: Within normal limits.  RETROPERITONEUM: No lymphadenopathy.    ABDOMINAL WALL: Within normal limits.  BONES: Within normal limits.    IMPRESSION: Mild wall thickening of the transverse, descending, and   proximal sigmoid colon which is compatible with colitis of   infectious/inflammatory etiology. TODD BENNETT  [id number]    HISTORY OF PRESENT ILLNESS:    28 yo M with hx of SLE (dx  fatigue serositis, myocarditis, malar rash, polyarthritis, alopecia and Raynauds), LN  IV/V with no chronicity (2017)   being manage with cellcept 3g daily with HCQ, DVT/PE chronically anticoagulated admitted for abd pain and diarrhea x 2 days found to have colitis on CT abd.  Pt has been managed with broad spectrum abx, still with abd pain and diarrhea.  Denies any blood with BM  Able to lissa liq diet but otherwise no alopecia, oral ulcers, chest pain/sob, LAD, or joint pain.  Pt unable to recall how he got the infection and no one else in the family with such symptoms of abd pain/diarrhea.    PAST MEDICAL & SURGICAL HISTORY:  Systemic lupus  Myocarditis  DVT (deep venous thrombosis)  Lupus  No significant past surgical history      Review of Systems:  Gen:  +fever, weight loss  HEENT: No blurry vision, no difficulty swallowing  CVS: No chest pain/palpitations  Resp: No SOB/wheezing  GI: +abd pain, diarrhea  MSK: no joint pain  Skin: No new rashes  Neuro: No headaches    MEDICATIONS  (STANDING):  cefTRIAXone   IVPB 2 Gram(s) IV Intermittent every 24 hours  hydroxychloroquine 200 milliGRAM(s) Oral every 12 hours  sodium chloride 0.9%. 1000 milliLiter(s) (100 mL/Hr) IV Continuous <Continuous>    MEDICATIONS  (PRN):  acetaminophen   Tablet 650 milliGRAM(s) Oral every 6 hours PRN For Temp greater than 38 C (100.4 F)  morphine  - Injectable 2 milliGRAM(s) IV Push every 4 hours PRN Moderate Pain (4 - 6)  morphine  - Injectable 4 milliGRAM(s) IV Push every 6 hours PRN Severe Pain (7 - 10)      Allergies    Vancomycin Hydrochloride (Pruritus; Rash; Hives)    Intolerances      FAMILY HISTORY:  Family history of systemic lupus erythematosus (SLE) in mother (Mother)      Vital Signs Last 24 Hrs  T(C): 36.7 (2018 13:07), Max: 39.2 (2018 09:50)  T(F): 98 (2018 13:07), Max: 102.6 (2018 09:50)  HR: 88 (2018 13:07) (82 - 106)  BP: 110/77 (2018 13:07) (104/58 - 118/76)  BP(mean): --  RR: 18 (2018 13:07) (16 - 18)  SpO2: 98% (2018 13:07) (96% - 98%)    Daily     Daily     Physical Exam:  General: No apparent distress  HEENT: EOMI, MMM  CVS: +S1/S2, RRR  Resp: CTA b/l  GI: Soft, NT/ND +BS  MSK: no synovitis, FROM, MS 5/5 in UE and LE b/l  Neuro: AAOx3  Skin: no rashe    LABS:                        10.1   13.69 )-----------( 258      ( 2018 08:09 )             32.7     06-11    136  |  99  |  5<L>  ----------------------------<  115<H>  3.1<L>   |  22  |  1.04    Ca    8.5      2018 07:07  Phos  1.8     06-10  Mg     1.6     06-10    TPro  8.2  /  Alb  4.0  /  TBili  0.6  /  DBili  x   /  AST  19  /  ALT  9<L>  /  AlkPhos  99  06-10    PT/INR - ( 2018 08:07 )   PT: 76.0 sec;   INR: 6.47 ratio         PTT - ( 10 Shad 2018 04:50 )  PTT:54.2 sec  Urinalysis Basic - ( 10 Shad 2018 06:28 )    Color: Yellow / Appearance: Clear / S.016 / pH: x  Gluc: x / Ketone: Trace  / Bili: Negative / Urobili: Negative   Blood: x / Protein: 150 mg/dL / Nitrite: Negative   Leuk Esterase: Negative / RBC: 5-10 /HPF / WBC 6-10 /HPF   Sq Epi: x / Non Sq Epi: OCC /HPF / Bacteria: Few /HPF    Culture Results:   Enteropathogenic E. coli (EPEC)  Salmonella species  DETECTED by PCR  *******Please Note:*******  GI panel PCR evaluates for:  Campylobacter, Plesiomonas shigelloides, Salmonella,  Vibrio, Yersinia enterocolitica, Enteroaggregative  Escherichia coli (EAEC), Enteropathogenic E.coli (EPEC),  Enterotoxigenic E. coli (ETEC) lt/st, Shiga-like  toxin-producing E. coli (STEC) stx1/stx2,  Shigella/ Enteroinvasive E. coli (EIEC), Cryptosporidium,  Cyclospora cayetanensis, Entamoeba histolytica,  Giardialamblia, Adenovirus F 40/41, Astrovirus,  Norovirus GI/GII, Rotavirus A, Sapovirus (06.10.18 @ 21:45)    Culture - Blood (06.10.18 @ 08:29)    Gram Stain:   Growth in aerobic bottle: Gram Positive Cocci in Clusters    Specimen Source: .Blood Blood-Peripheral    Culture Results:   Growth in aerobic bottle: Gram Positive Cocci in Clusters          RADIOLOGY & ADDITIONAL STUDIES:  < from: CT Abdomen and Pelvis No Cont (06.10.18 @ 06:34) >  FINDINGS:    LOWER CHEST: Right lower lobe subsegmental atelectasis.    LIVER: Within normal limits.  BILE DUCTS: Normal caliber.  GALLBLADDER: Within normal limits.  SPLEEN: Within normal limits.  PANCREAS: Within normal limits.  ADRENALS: Within normal limits.  KIDNEYS/URETERS: Within normal limits.    BLADDER: Within normal limits.  REPRODUCTIVE ORGANS: The prostate is within normal limits.    BOWEL: Mild wall thickening of the transverse, descending, and proximal   sigmoid colon. No bowel obstruction. Appendix is normal.  PERITONEUM: No ascites.  VESSELS: Within normal limits.  RETROPERITONEUM: No lymphadenopathy.    ABDOMINAL WALL: Within normal limits.  BONES: Within normal limits.    IMPRESSION: Mild wall thickening of the transverse, descending, and   proximal sigmoid colon which is compatible with colitis of   infectious/inflammatory etiology. TODD BENNETT  [id number]    HISTORY OF PRESENT ILLNESS:    26 yo M with hx of SLE (dx 2007 fatigue serositis, myocarditis, malar rash, polyarthritis, alopecia and Raynauds), LN  IV/V with no chronicity (2017)   being manage with cellcept 3g daily with HCQ, DVT/PE chronically anticoagulated admitted for abd pain and diarrhea x 2 days found to have colitis on CT abd.  Pt has been managed with broad spectrum abx, still with abd pain and diarrhea.  Denies any blood with BM  Able to lissa liq diet    PAST MEDICAL & SURGICAL HISTORY:  Systemic lupus  Myocarditis  DVT (deep venous thrombosis)  Lupus  No significant past surgical history      Review of Systems:  no alopecia, oral ulcers, chest pain/sob, LAD, or joint pain.  Pt unable to recall how he got the infection and no one else in the family with such symptoms of abd pain/diarrhea.      Gen:  +fever, weight loss  HEENT: No blurry vision, no difficulty swallowing  CVS: No chest pain/palpitations  Resp: No SOB/wheezing  GI: +abd pain, diarrhea  MSK: no joint pain  Skin: No new rashes  Neuro: No headaches    MEDICATIONS  (STANDING):  cefTRIAXone   IVPB 2 Gram(s) IV Intermittent every 24 hours  hydroxychloroquine 200 milliGRAM(s) Oral every 12 hours  sodium chloride 0.9%. 1000 milliLiter(s) (100 mL/Hr) IV Continuous <Continuous>    MEDICATIONS  (PRN):  acetaminophen   Tablet 650 milliGRAM(s) Oral every 6 hours PRN For Temp greater than 38 C (100.4 F)  morphine  - Injectable 2 milliGRAM(s) IV Push every 4 hours PRN Moderate Pain (4 - 6)  morphine  - Injectable 4 milliGRAM(s) IV Push every 6 hours PRN Severe Pain (7 - 10)      Allergies    Vancomycin Hydrochloride (Pruritus; Rash; Hives)    Intolerances      FAMILY HISTORY:  Family history of systemic lupus erythematosus (SLE) in mother (Mother)      Vital Signs Last 24 Hrs  T(C): 36.7 (2018 13:07), Max: 39.2 (2018 09:50)  T(F): 98 (2018 13:07), Max: 102.6 (2018 09:50)  HR: 88 (2018 13:07) (82 - 106)  BP: 110/77 (2018 13:07) (104/58 - 118/76)  BP(mean): --  RR: 18 (2018 13:07) (16 - 18)  SpO2: 98% (2018 13:07) (96% - 98%)    Daily     Daily     Physical Exam:  General: No apparent distress  HEENT: EOMI, MMM  CVS: +S1/S2, RRR  Resp: CTA b/l  GI: Soft, NT/ND +BS  MSK: no synovitis, FROM, MS 5/5 in UE and LE b/l  Neuro: AAOx3  Skin: no rash    LABS:                        10.1   13.69 )-----------( 258      ( 2018 08:09 )             32.7     06-11    136  |  99  |  5<L>  ----------------------------<  115<H>  3.1<L>   |  22  |  1.04    Ca    8.5      2018 07:07  Phos  1.8     06-10  Mg     1.6     06-10    TPro  8.2  /  Alb  4.0  /  TBili  0.6  /  DBili  x   /  AST  19  /  ALT  9<L>  /  AlkPhos  99  06-10    PT/INR - ( 2018 08:07 )   PT: 76.0 sec;   INR: 6.47 ratio         PTT - ( 10 Shad 2018 04:50 )  PTT:54.2 sec  Urinalysis Basic - ( 10 Shad 2018 06:28 )    Color: Yellow / Appearance: Clear / S.016 / pH: x  Gluc: x / Ketone: Trace  / Bili: Negative / Urobili: Negative   Blood: x / Protein: 150 mg/dL / Nitrite: Negative   Leuk Esterase: Negative / RBC: 5-10 /HPF / WBC 6-10 /HPF   Sq Epi: x / Non Sq Epi: OCC /HPF / Bacteria: Few /HPF    Culture Results:   Enteropathogenic E. coli (EPEC)  Salmonella species  DETECTED by PCR  *******Please Note:*******  GI panel PCR evaluates for:  Campylobacter, Plesiomonas shigelloides, Salmonella,  Vibrio, Yersinia enterocolitica, Enteroaggregative  Escherichia coli (EAEC), Enteropathogenic E.coli (EPEC),  Enterotoxigenic E. coli (ETEC) lt/st, Shiga-like  toxin-producing E. coli (STEC) stx1/stx2,  Shigella/ Enteroinvasive E. coli (EIEC), Cryptosporidium,  Cyclospora cayetanensis, Entamoeba histolytica,  Giardialamblia, Adenovirus F 40/41, Astrovirus,  Norovirus GI/GII, Rotavirus A, Sapovirus (06.10.18 @ 21:45)    Culture - Blood (06.10.18 @ 08:29)    Gram Stain:   Growth in aerobic bottle: Gram Positive Cocci in Clusters    Specimen Source: .Blood Blood-Peripheral    Culture Results:   Growth in aerobic bottle: Gram Positive Cocci in Clusters          RADIOLOGY & ADDITIONAL STUDIES:  < from: CT Abdomen and Pelvis No Cont (06.10.18 @ 06:34) >  FINDINGS:    LOWER CHEST: Right lower lobe subsegmental atelectasis.    LIVER: Within normal limits.  BILE DUCTS: Normal caliber.  GALLBLADDER: Within normal limits.  SPLEEN: Within normal limits.  PANCREAS: Within normal limits.  ADRENALS: Within normal limits.  KIDNEYS/URETERS: Within normal limits.    BLADDER: Within normal limits.  REPRODUCTIVE ORGANS: The prostate is within normal limits.    BOWEL: Mild wall thickening of the transverse, descending, and proximal   sigmoid colon. No bowel obstruction. Appendix is normal.  PERITONEUM: No ascites.  VESSELS: Within normal limits.  RETROPERITONEUM: No lymphadenopathy.    ABDOMINAL WALL: Within normal limits.  BONES: Within normal limits.    IMPRESSION: Mild wall thickening of the transverse, descending, and   proximal sigmoid colon which is compatible with colitis of   infectious/inflammatory etiology.

## 2018-06-11 NOTE — MEDICAL STUDENT PROGRESS NOTE(EDUCATION) - NS MD HP STUD ASPLAN ASSES FT
This is a 27 year old man with a PMHx of SLE who presented with a chief complaint of abdominal pain with diarrhea and fevers. Patient was febrile overnight, stool studies showed +Salmonella + EPEC. Blood cultures have repeatedly demonstrated gram positive organisms. Physical exam only notable for LLQ pain.

## 2018-06-11 NOTE — MEDICAL STUDENT PROGRESS NOTE(EDUCATION) - NS MD HP STUD ASPLAN PLAN FT
***note in progress***      - Salmonella infection: Treat with [ ]Abx, continue maintenance fluids, trend BMPs  - Gram positive blood cultures: ID consulted, patient allergic to vanc. Gastroenteritis  - Patient with positive stool PCR for salmonella and EPEC  - Currently on day 2 of cipro/flagyl  - Patient still endorsing diarrhea. Will continue maintenance fluids and monitor electrolytes.       Sepsis  - Patient met SIRS criteria with source of infection  - Received 2L NS in ER with good response.   - trend fever and treat PRN with tyelenol.   - gram positive cultures found on Bcx. Will repeat and consult ID.

## 2018-06-11 NOTE — CONSULT NOTE ADULT - ASSESSMENT
26 yo M with hx of SLE/LN on chronic immunosuppression with cellcept, medrol now bacteremia and stool PCR showing Enteropathogenic E. coli -Salmonella species colitis.  Pt with supratheraputic INR and currently wo any other clinical activity of SLE activity     Plan:  cont treat with abx per ID for colitis  hold cellcept in light of infection  cont HCQ  consider holding warfarin for a day or two given supratheraputic INR    will follow     Dr. Nguyễn ([phone])  Rheumatology Fellow 28 yo M with hx of SLE/LN on chronic immunosuppression with cellcept, medrol now bacteremia and stool PCR showing Enteropathogenic E. coli -Salmonella species colitis.  Pt with supratheraputic INR    Plan:  cont treat with abx per ID for colitis  hold cellcept in light of infection  cont HCQ  consider holding warfarin for a day or two given supratheraputic INR however would resume if pt INR decreaes rapidly  as he has high risk of thrombotic event    will follow     Dr. Nguyễn ([phone])  Rheumatology Fellow 26 yo M with hx of SLE/ APS and lupus nephritis on chronic immunosuppression with cellcept   now with bacteremia and stool PCR showing Enteropathogenic E. coli -Salmonella species colitis.  Pt with APS ( DVT and PE and positive LAC on Coumadin- now with supratheraputic INR )    Plan:  cont treat with abx per ID for colitis  hold cellcept  until infection resolves  cont HCQ  consider holding warfarin for a day or two given supratheraputic INR however would resume if pt INR decreases rapidly  as he has high risk of thrombotic event    will follow     Dr. Nguyễn ([phone])  Rheumatology Fellow

## 2018-06-11 NOTE — PROGRESS NOTE ADULT - PROBLEM SELECTOR PLAN 2
Patient meets SIRS at admission with tachycardia, fever, and leukocytosis with suspected GI source. 1/4 bottles positive for GPC in clusters, likely contaminant.  -follow up pending blood and urine cultures  -c/w fluid resuscitation  -trend fever curve and hemodynamics  -Tylenol PRN fever  -may broaden antibiotics if continues to spike over next day

## 2018-06-11 NOTE — PROVIDER CONTACT NOTE (OTHER) - ACTION/TREATMENT ORDERED:
Acetaminophen given, continue plan of care.  patient on Metronidazole and Ciprofloxacin as ordered. IV fluids running.

## 2018-06-11 NOTE — CONSULT NOTE ADULT - ASSESSMENT
28 y/o M hx SLE c/b lupus nephritis, myocarditis (2007) DVT/PE (2012), APLS on coumadin presents with several days of fever.     Patient reports 3-4 days of high fevers at home up to 104 with chills. He reports nonproductive cough and congestion. Denies chest pain or shortness of breath, headache, dizziness, or sore throat. In the last two days, he has had lower abdominal pain  multiple loose, watery bowel movements over the last two days without hematochezia or melena.  Patient denies recent travel, sick contacts, exposures, hospitalizations or antibiotic use. Over the last one day, he noticed decrease in amount of urine which along with the continued fevers, prompted him to present to emergency room.   He was found to have colitis on ct scan, bacteremia with gram pos cocci in clusters, and Enteropathogenic e coli,    Plan  BACTEREMIA WITH GRAM POS COCCI CLUSTERS  both sets of blood cx pos,   would start daptomycin, allergic to vancomycin   Not sure what this organism is since the pcr was neg for staph, will wait for final identification  repeat blood cx    Salmonella/ EPEC/ Colitis  Would start ceftriaxone  Will follow up stool cx   D/C cirpo/flagyl  dw attending. 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), APLS on mycophenolate and Plaquenil p/w fever, chills, abd pain and non bloody diarrhea, WBC was 13.9 and CT abd showed colitis of transverse, descending and sigmoid, he was started on cipro and flagyl but blood culture came back with 4/4 GPC in clusters, not identified by PCR and stool PCR showed salmonella sp and EPEC    sepsis, colitis due to salmonella, GPC in clusters bacteremia  I called the lab and the smear was reviewed again and showed GPC in clusters no GNR and PCR negative    * DC cipro and flagyl  * start ceftriaxone 2 g qd  * start Daptomycin 8 mg/Kg qd  * monitor the CPK  * repeat blood cx  * echo

## 2018-06-12 DIAGNOSIS — A41.89 OTHER SPECIFIED SEPSIS: ICD-10-CM

## 2018-06-12 LAB
ANION GAP SERPL CALC-SCNC: 11 MMOL/L — SIGNIFICANT CHANGE UP (ref 5–17)
BASOPHILS # BLD AUTO: 0.03 K/UL — SIGNIFICANT CHANGE UP (ref 0–0.2)
BASOPHILS NFR BLD AUTO: 0.3 % — SIGNIFICANT CHANGE UP (ref 0–2)
BUN SERPL-MCNC: <4 MG/DL — LOW (ref 7–23)
CALCIUM SERPL-MCNC: 8.7 MG/DL — SIGNIFICANT CHANGE UP (ref 8.4–10.5)
CHLORIDE SERPL-SCNC: 104 MMOL/L — SIGNIFICANT CHANGE UP (ref 96–108)
CO2 SERPL-SCNC: 24 MMOL/L — SIGNIFICANT CHANGE UP (ref 22–31)
CREAT SERPL-MCNC: 0.84 MG/DL — SIGNIFICANT CHANGE UP (ref 0.5–1.3)
CULTURE RESULTS: SIGNIFICANT CHANGE UP
DSDNA AB SER-ACNC: 76 IU/ML — HIGH
EOSINOPHIL # BLD AUTO: 0.2 K/UL — SIGNIFICANT CHANGE UP (ref 0–0.5)
EOSINOPHIL NFR BLD AUTO: 2.2 % — SIGNIFICANT CHANGE UP (ref 0–6)
GLUCOSE SERPL-MCNC: 82 MG/DL — SIGNIFICANT CHANGE UP (ref 70–99)
HCT VFR BLD CALC: 32.8 % — LOW (ref 39–50)
HGB BLD-MCNC: 10 G/DL — LOW (ref 13–17)
IMM GRANULOCYTES NFR BLD AUTO: 0.3 % — SIGNIFICANT CHANGE UP (ref 0–1.5)
INR BLD: 5.49 RATIO — CRITICAL HIGH (ref 0.88–1.16)
LYMPHOCYTES # BLD AUTO: 2.37 K/UL — SIGNIFICANT CHANGE UP (ref 1–3.3)
LYMPHOCYTES # BLD AUTO: 25.5 % — SIGNIFICANT CHANGE UP (ref 13–44)
MCHC RBC-ENTMCNC: 24.9 PG — LOW (ref 27–34)
MCHC RBC-ENTMCNC: 30.5 GM/DL — LOW (ref 32–36)
MCV RBC AUTO: 81.8 FL — SIGNIFICANT CHANGE UP (ref 80–100)
MONOCYTES # BLD AUTO: 1.12 K/UL — HIGH (ref 0–0.9)
MONOCYTES NFR BLD AUTO: 12.1 % — SIGNIFICANT CHANGE UP (ref 2–14)
NEUTROPHILS # BLD AUTO: 5.54 K/UL — SIGNIFICANT CHANGE UP (ref 1.8–7.4)
NEUTROPHILS NFR BLD AUTO: 59.6 % — SIGNIFICANT CHANGE UP (ref 43–77)
PLATELET # BLD AUTO: 284 K/UL — SIGNIFICANT CHANGE UP (ref 150–400)
POTASSIUM SERPL-MCNC: 3.6 MMOL/L — SIGNIFICANT CHANGE UP (ref 3.5–5.3)
POTASSIUM SERPL-SCNC: 3.6 MMOL/L — SIGNIFICANT CHANGE UP (ref 3.5–5.3)
PROTHROM AB SERPL-ACNC: 64.2 SEC — HIGH (ref 10–13.1)
RBC # BLD: 4.01 M/UL — LOW (ref 4.2–5.8)
RBC # FLD: 17.4 % — HIGH (ref 10.3–14.5)
SODIUM SERPL-SCNC: 139 MMOL/L — SIGNIFICANT CHANGE UP (ref 135–145)
SPECIMEN SOURCE: SIGNIFICANT CHANGE UP
WBC # BLD: 9.29 K/UL — SIGNIFICANT CHANGE UP (ref 3.8–10.5)
WBC # FLD AUTO: 9.29 K/UL — SIGNIFICANT CHANGE UP (ref 3.8–10.5)

## 2018-06-12 PROCEDURE — 93306 TTE W/DOPPLER COMPLETE: CPT | Mod: 26

## 2018-06-12 PROCEDURE — 99232 SBSQ HOSP IP/OBS MODERATE 35: CPT | Mod: GC

## 2018-06-12 PROCEDURE — 99233 SBSQ HOSP IP/OBS HIGH 50: CPT | Mod: GC

## 2018-06-12 RX ORDER — LOPERAMIDE HCL 2 MG
2 TABLET ORAL EVERY 8 HOURS
Qty: 0 | Refills: 0 | Status: DISCONTINUED | OUTPATIENT
Start: 2018-06-12 | End: 2018-06-13

## 2018-06-12 RX ORDER — CEFTRIAXONE 500 MG/1
2 INJECTION, POWDER, FOR SOLUTION INTRAMUSCULAR; INTRAVENOUS
Qty: 0 | Refills: 0 | Status: DISCONTINUED | OUTPATIENT
Start: 2018-06-12 | End: 2018-06-13

## 2018-06-12 RX ORDER — SERTRALINE 25 MG/1
100 TABLET, FILM COATED ORAL DAILY
Qty: 0 | Refills: 0 | Status: DISCONTINUED | OUTPATIENT
Start: 2018-06-12 | End: 2018-06-13

## 2018-06-12 RX ORDER — LOPERAMIDE HCL 2 MG
2 TABLET ORAL ONCE
Qty: 0 | Refills: 0 | Status: COMPLETED | OUTPATIENT
Start: 2018-06-12 | End: 2018-06-12

## 2018-06-12 RX ADMIN — Medication 200 MILLIGRAM(S): at 05:07

## 2018-06-12 RX ADMIN — SODIUM CHLORIDE 100 MILLILITER(S): 9 INJECTION INTRAMUSCULAR; INTRAVENOUS; SUBCUTANEOUS at 12:39

## 2018-06-12 RX ADMIN — Medication 200 MILLIGRAM(S): at 17:23

## 2018-06-12 RX ADMIN — SODIUM CHLORIDE 100 MILLILITER(S): 9 INJECTION INTRAMUSCULAR; INTRAVENOUS; SUBCUTANEOUS at 00:00

## 2018-06-12 RX ADMIN — DAPTOMYCIN 122.4 MILLIGRAM(S): 500 INJECTION, POWDER, LYOPHILIZED, FOR SOLUTION INTRAVENOUS at 14:09

## 2018-06-12 RX ADMIN — SERTRALINE 100 MILLIGRAM(S): 25 TABLET, FILM COATED ORAL at 12:39

## 2018-06-12 RX ADMIN — Medication 2 MILLIGRAM(S): at 12:38

## 2018-06-12 RX ADMIN — Medication 2 MILLIGRAM(S): at 21:56

## 2018-06-12 RX ADMIN — CEFTRIAXONE 100 GRAM(S): 500 INJECTION, POWDER, FOR SOLUTION INTRAMUSCULAR; INTRAVENOUS at 15:13

## 2018-06-12 RX ADMIN — SODIUM CHLORIDE 100 MILLILITER(S): 9 INJECTION INTRAMUSCULAR; INTRAVENOUS; SUBCUTANEOUS at 21:56

## 2018-06-12 NOTE — MEDICAL STUDENT PROGRESS NOTE(EDUCATION) - SUBJECTIVE AND OBJECTIVE BOX
S: Patient says he had around seven episodes of diarrhea last night that were associated with pain 2/2 to severe cramping. He is wondering if there is anyway we could lessen his frequency of diarrhea without compromising treatment. He also notes that the ulcers on his tongue are beginning to bother him and is requesting something to null the pain. He denies HA, CP, SOB, N/V.       O:  Vital Signs Last 24 Hrs  T(C): 36.7 (12 Jun 2018 05:06), Max: 39.2 (11 Jun 2018 09:50)  T(F): 98 (12 Jun 2018 05:06), Max: 102.6 (11 Jun 2018 09:50)  HR: 84 (12 Jun 2018 05:06) (78 - 106)  BP: 102/65 (12 Jun 2018 05:06) (102/65 - 111/72)  BP(mean): --  RR: 18 (12 Jun 2018 05:06) (18 - 18)  SpO2: 96% (12 Jun 2018 05:06) (96% - 99%)      PE:   Gen: NAD, lying comfortably on the bed.  HEENT: NCAT, normal sclera, [place], Normal range of extraocular movements, throat non-erythematous  card: Normal S1, S2 no murmurs, rubs or gallops  Pulm: CTABL  GI: NBS, tenderness in RLQ and midline lower abdomen

## 2018-06-12 NOTE — PROGRESS NOTE ADULT - PROBLEM SELECTOR PLAN 2
GPC bacteremia, speciation and sensitivities pending at this time.  -follow up pending blood cultures results  -c/w fluid resuscitation  -trend fever curve and hemodynamics  -Tylenol PRN fever  -appreciate infectious disease recommendations GPC bacteremia, speciation and sensitivities pending at this time.  -follow up pending blood cultures results  -c/w fluid resuscitation  -trend fever curve and hemodynamics  -Tylenol PRN fever  -appreciate infectious disease recommendations  -c/w Daptomycin, check CK tomorrow

## 2018-06-12 NOTE — MEDICAL STUDENT PROGRESS NOTE(EDUCATION) - NS MD HP STUD ASPLAN ASSES FT
This is a 27-year old with PMHx of SLE c/b lupus nephritis who presented with significant abdominal pain with diarrhea found to have + Salmonella stool culture +gram positive bacteria in BCx.

## 2018-06-12 NOTE — PROGRESS NOTE ADULT - PROBLEM SELECTOR PLAN 1
Patient continues to have abdominal pain, fever and watery diarrhea. Stool cultures returned positive for Salmonella and EPEC. Appreciated ID recs. Patient escalated to ceftriaxone yesterday 6/11. Patient was afebrile overnight and repeat blood cultures are pending at this time.  -Continue with maintenance IVF.   -continue with antibiotics as above Ceftriaxone 2g daily.  -stool culture positive for Salmonella  -C Diff negative   -follow up pending blood cultures and pending speciation/sensitivity.    -serial abdominal examinations and stool count  -morphine prn severe pain Patient continues to have abdominal pain, fever and watery diarrhea. Stool cultures returned positive for Salmonella and EPEC. Appreciate ID recs. Patient escalated to ceftriaxone yesterday 6/11. Patient was afebrile overnight and repeat blood cultures are pending at this time.  -Continue with maintenance IVF.   -continue with antibiotics as above Ceftriaxone 2g daily.  -stool culture positive for Salmonella  -C Diff negative   -follow up pending blood cultures and pending speciation/sensitivity.    -serial abdominal examinations and stool count  -morphine prn severe pain Patient continues to have abdominal pain, fever and watery diarrhea. Stool cultures returned positive for Salmonella and EPEC. Appreciate ID recs. Patient escalated to ceftriaxone yesterday 6/11. Patient was afebrile overnight and repeat blood cultures are pending at this time.  -Continue with maintenance IVF.   -continue with antibiotics as above Ceftriaxone 2g daily.  -stool culture positive for Salmonella  -C Diff negative   -follow up pending blood cultures and pending speciation/sensitivity  -serial abdominal examinations and stool count  -morphine prn severe pain

## 2018-06-12 NOTE — DIETITIAN INITIAL EVALUATION ADULT. - OTHER INFO
nutrition consult for supplements. Patient was on clear liquid diet when first admitted and disliked promote supplement.  Patient now eating well, tolerating regular diet. Patient feels he lost some weight because he had not been able to tolerate PO intake without diarrhea. Patient states that he has never had colitis before and  does not follow any restricted diet at home. Patient feels his "salmonella" poisoning was from a restaurant chicken soup that he had and that is what triggered the colitis.

## 2018-06-12 NOTE — MEDICAL STUDENT PROGRESS NOTE(EDUCATION) - NS MD HP STUD ASPLAN PLAN FT
Bacteremia:  - Appreciate ID recs  - rpt Bcx  - switched to ceftriaxone + daptomycin. Received first dose yesterday  -TTE  - Monitor fever curve  -Tyelenol PRN for fever    Abdominal pain:   - Patient in significant pain during bowl movements  - Morphine PRN Bacteremia:  - Appreciate ID recs  - rpt Bcx  - switched to ceftriaxone + daptomycin. Received first dose yesterday  - TTE to r/o endocarditis   - Monitor fever curve  - Tyelenol PRN for fever    Abdominal pain:   - Patient in significant pain during bowl movements  - Tyelenol and Morphine PRN

## 2018-06-12 NOTE — PROGRESS NOTE ADULT - PROBLEM SELECTOR PLAN 7
currently on pharmacologic A/C currently on pharmacologic A/C, supratherapeutic inr, holding coumadin

## 2018-06-12 NOTE — PROVIDER CONTACT NOTE (MEDICATION) - RECOMMENDATIONS
pt cellcept being held r/t sepsis. pt wondering if there is a specific reason this medication has not been given.

## 2018-06-13 ENCOUNTER — TRANSCRIPTION ENCOUNTER (OUTPATIENT)
Age: 28
End: 2018-06-13

## 2018-06-13 VITALS — WEIGHT: 151.9 LBS

## 2018-06-13 LAB
-  AMPICILLIN/SULBACTAM: SIGNIFICANT CHANGE UP
-  CEFAZOLIN: SIGNIFICANT CHANGE UP
-  CLINDAMYCIN: SIGNIFICANT CHANGE UP
-  ERYTHROMYCIN: SIGNIFICANT CHANGE UP
-  GENTAMICIN: SIGNIFICANT CHANGE UP
-  OXACILLIN: SIGNIFICANT CHANGE UP
-  PENICILLIN: SIGNIFICANT CHANGE UP
-  RIFAMPIN: SIGNIFICANT CHANGE UP
-  TETRACYCLINE: SIGNIFICANT CHANGE UP
-  TRIMETHOPRIM/SULFAMETHOXAZOLE: SIGNIFICANT CHANGE UP
-  VANCOMYCIN: SIGNIFICANT CHANGE UP
ANION GAP SERPL CALC-SCNC: 13 MMOL/L — SIGNIFICANT CHANGE UP (ref 5–17)
BASOPHILS # BLD AUTO: 0.05 K/UL — SIGNIFICANT CHANGE UP (ref 0–0.2)
BASOPHILS NFR BLD AUTO: 0.6 % — SIGNIFICANT CHANGE UP (ref 0–2)
BUN SERPL-MCNC: 7 MG/DL — SIGNIFICANT CHANGE UP (ref 7–23)
CALCIUM SERPL-MCNC: 8.8 MG/DL — SIGNIFICANT CHANGE UP (ref 8.4–10.5)
CHLORIDE SERPL-SCNC: 106 MMOL/L — SIGNIFICANT CHANGE UP (ref 96–108)
CK SERPL-CCNC: 221 U/L — HIGH (ref 30–200)
CO2 SERPL-SCNC: 24 MMOL/L — SIGNIFICANT CHANGE UP (ref 22–31)
CREAT SERPL-MCNC: 0.83 MG/DL — SIGNIFICANT CHANGE UP (ref 0.5–1.3)
CULTURE RESULTS: SIGNIFICANT CHANGE UP
EOSINOPHIL # BLD AUTO: 0.35 K/UL — SIGNIFICANT CHANGE UP (ref 0–0.5)
EOSINOPHIL NFR BLD AUTO: 4 % — SIGNIFICANT CHANGE UP (ref 0–6)
GLUCOSE SERPL-MCNC: 81 MG/DL — SIGNIFICANT CHANGE UP (ref 70–99)
HCT VFR BLD CALC: 32 % — LOW (ref 39–50)
HGB BLD-MCNC: 9.5 G/DL — LOW (ref 13–17)
IMM GRANULOCYTES NFR BLD AUTO: 0.6 % — SIGNIFICANT CHANGE UP (ref 0–1.5)
INR BLD: 3.4 RATIO — HIGH (ref 0.88–1.16)
LYMPHOCYTES # BLD AUTO: 2.81 K/UL — SIGNIFICANT CHANGE UP (ref 1–3.3)
LYMPHOCYTES # BLD AUTO: 31.8 % — SIGNIFICANT CHANGE UP (ref 13–44)
MCHC RBC-ENTMCNC: 24.2 PG — LOW (ref 27–34)
MCHC RBC-ENTMCNC: 29.7 GM/DL — LOW (ref 32–36)
MCV RBC AUTO: 81.4 FL — SIGNIFICANT CHANGE UP (ref 80–100)
METHOD TYPE: SIGNIFICANT CHANGE UP
MONOCYTES # BLD AUTO: 1 K/UL — HIGH (ref 0–0.9)
MONOCYTES NFR BLD AUTO: 11.3 % — SIGNIFICANT CHANGE UP (ref 2–14)
NEUTROPHILS # BLD AUTO: 4.59 K/UL — SIGNIFICANT CHANGE UP (ref 1.8–7.4)
NEUTROPHILS NFR BLD AUTO: 51.7 % — SIGNIFICANT CHANGE UP (ref 43–77)
ORGANISM # SPEC MICROSCOPIC CNT: SIGNIFICANT CHANGE UP
PLATELET # BLD AUTO: 322 K/UL — SIGNIFICANT CHANGE UP (ref 150–400)
POTASSIUM SERPL-MCNC: 4.1 MMOL/L — SIGNIFICANT CHANGE UP (ref 3.5–5.3)
POTASSIUM SERPL-SCNC: 4.1 MMOL/L — SIGNIFICANT CHANGE UP (ref 3.5–5.3)
PROTHROM AB SERPL-ACNC: 39.4 SEC — HIGH (ref 10–13.1)
RBC # BLD: 3.93 M/UL — LOW (ref 4.2–5.8)
RBC # FLD: 17.4 % — HIGH (ref 10.3–14.5)
SODIUM SERPL-SCNC: 143 MMOL/L — SIGNIFICANT CHANGE UP (ref 135–145)
SPECIMEN SOURCE: SIGNIFICANT CHANGE UP
WBC # BLD: 8.85 K/UL — SIGNIFICANT CHANGE UP (ref 3.8–10.5)
WBC # FLD AUTO: 8.85 K/UL — SIGNIFICANT CHANGE UP (ref 3.8–10.5)

## 2018-06-13 PROCEDURE — 84100 ASSAY OF PHOSPHORUS: CPT

## 2018-06-13 PROCEDURE — 83605 ASSAY OF LACTIC ACID: CPT

## 2018-06-13 PROCEDURE — 99285 EMERGENCY DEPT VISIT HI MDM: CPT | Mod: 25

## 2018-06-13 PROCEDURE — 93005 ELECTROCARDIOGRAM TRACING: CPT

## 2018-06-13 PROCEDURE — 82330 ASSAY OF CALCIUM: CPT

## 2018-06-13 PROCEDURE — 96374 THER/PROPH/DIAG INJ IV PUSH: CPT

## 2018-06-13 PROCEDURE — 80053 COMPREHEN METABOLIC PANEL: CPT

## 2018-06-13 PROCEDURE — 82803 BLOOD GASES ANY COMBINATION: CPT

## 2018-06-13 PROCEDURE — 74176 CT ABD & PELVIS W/O CONTRAST: CPT

## 2018-06-13 PROCEDURE — 84295 ASSAY OF SERUM SODIUM: CPT

## 2018-06-13 PROCEDURE — 87507 IADNA-DNA/RNA PROBE TQ 12-25: CPT

## 2018-06-13 PROCEDURE — 83735 ASSAY OF MAGNESIUM: CPT

## 2018-06-13 PROCEDURE — 86225 DNA ANTIBODY NATIVE: CPT

## 2018-06-13 PROCEDURE — 86160 COMPLEMENT ANTIGEN: CPT

## 2018-06-13 PROCEDURE — 87046 STOOL CULTR AEROBIC BACT EA: CPT

## 2018-06-13 PROCEDURE — 87150 DNA/RNA AMPLIFIED PROBE: CPT

## 2018-06-13 PROCEDURE — 87040 BLOOD CULTURE FOR BACTERIA: CPT

## 2018-06-13 PROCEDURE — 87633 RESP VIRUS 12-25 TARGETS: CPT

## 2018-06-13 PROCEDURE — 82947 ASSAY GLUCOSE BLOOD QUANT: CPT

## 2018-06-13 PROCEDURE — 87449 NOS EACH ORGANISM AG IA: CPT

## 2018-06-13 PROCEDURE — 99232 SBSQ HOSP IP/OBS MODERATE 35: CPT

## 2018-06-13 PROCEDURE — 93306 TTE W/DOPPLER COMPLETE: CPT

## 2018-06-13 PROCEDURE — 85027 COMPLETE CBC AUTOMATED: CPT

## 2018-06-13 PROCEDURE — 96375 TX/PRO/DX INJ NEW DRUG ADDON: CPT

## 2018-06-13 PROCEDURE — 84156 ASSAY OF PROTEIN URINE: CPT

## 2018-06-13 PROCEDURE — 85014 HEMATOCRIT: CPT

## 2018-06-13 PROCEDURE — 99239 HOSP IP/OBS DSCHRG MGMT >30: CPT

## 2018-06-13 PROCEDURE — 71046 X-RAY EXAM CHEST 2 VIEWS: CPT

## 2018-06-13 PROCEDURE — 84300 ASSAY OF URINE SODIUM: CPT

## 2018-06-13 PROCEDURE — 82550 ASSAY OF CK (CPK): CPT

## 2018-06-13 PROCEDURE — 82435 ASSAY OF BLOOD CHLORIDE: CPT

## 2018-06-13 PROCEDURE — 87177 OVA AND PARASITES SMEARS: CPT

## 2018-06-13 PROCEDURE — 87045 FECES CULTURE AEROBIC BACT: CPT

## 2018-06-13 PROCEDURE — 87186 SC STD MICRODIL/AGAR DIL: CPT

## 2018-06-13 PROCEDURE — 80048 BASIC METABOLIC PNL TOTAL CA: CPT

## 2018-06-13 PROCEDURE — 87581 M.PNEUMON DNA AMP PROBE: CPT

## 2018-06-13 PROCEDURE — 84132 ASSAY OF SERUM POTASSIUM: CPT

## 2018-06-13 PROCEDURE — 85730 THROMBOPLASTIN TIME PARTIAL: CPT

## 2018-06-13 PROCEDURE — 82570 ASSAY OF URINE CREATININE: CPT

## 2018-06-13 PROCEDURE — 87324 CLOSTRIDIUM AG IA: CPT

## 2018-06-13 PROCEDURE — 87086 URINE CULTURE/COLONY COUNT: CPT

## 2018-06-13 PROCEDURE — 81001 URINALYSIS AUTO W/SCOPE: CPT

## 2018-06-13 PROCEDURE — 85610 PROTHROMBIN TIME: CPT

## 2018-06-13 PROCEDURE — 87798 DETECT AGENT NOS DNA AMP: CPT

## 2018-06-13 PROCEDURE — 87486 CHLMYD PNEUM DNA AMP PROBE: CPT

## 2018-06-13 RX ORDER — LOPERAMIDE HCL 2 MG
1 TABLET ORAL
Qty: 5 | Refills: 0 | OUTPATIENT
Start: 2018-06-13

## 2018-06-13 RX ORDER — MYCOPHENOLATE MOFETIL 250 MG/1
2 CAPSULE ORAL
Qty: 0 | Refills: 0 | COMMUNITY

## 2018-06-13 RX ORDER — CARVEDILOL PHOSPHATE 80 MG/1
1 CAPSULE, EXTENDED RELEASE ORAL
Qty: 0 | Refills: 0 | COMMUNITY

## 2018-06-13 RX ORDER — CIPROFLOXACIN LACTATE 400MG/40ML
500 VIAL (ML) INTRAVENOUS EVERY 12 HOURS
Qty: 0 | Refills: 0 | Status: DISCONTINUED | OUTPATIENT
Start: 2018-06-13 | End: 2018-06-13

## 2018-06-13 RX ORDER — LISINOPRIL 2.5 MG/1
1 TABLET ORAL
Qty: 0 | Refills: 0 | COMMUNITY

## 2018-06-13 RX ORDER — CIPROFLOXACIN LACTATE 400MG/40ML
1 VIAL (ML) INTRAVENOUS
Qty: 0 | Refills: 0 | COMMUNITY
Start: 2018-06-13

## 2018-06-13 RX ORDER — CIPROFLOXACIN LACTATE 400MG/40ML
1 VIAL (ML) INTRAVENOUS
Qty: 8 | Refills: 0 | OUTPATIENT
Start: 2018-06-13 | End: 2018-06-16

## 2018-06-13 RX ORDER — MYCOPHENOLATE MOFETIL 250 MG/1
3 CAPSULE ORAL
Qty: 0 | Refills: 0 | COMMUNITY

## 2018-06-13 RX ADMIN — SODIUM CHLORIDE 100 MILLILITER(S): 9 INJECTION INTRAMUSCULAR; INTRAVENOUS; SUBCUTANEOUS at 05:58

## 2018-06-13 RX ADMIN — Medication 500 MILLIGRAM(S): at 17:47

## 2018-06-13 RX ADMIN — Medication 200 MILLIGRAM(S): at 05:51

## 2018-06-13 RX ADMIN — SERTRALINE 100 MILLIGRAM(S): 25 TABLET, FILM COATED ORAL at 13:01

## 2018-06-13 RX ADMIN — Medication 2 MILLIGRAM(S): at 14:49

## 2018-06-13 RX ADMIN — Medication 200 MILLIGRAM(S): at 17:47

## 2018-06-13 NOTE — DISCHARGE NOTE ADULT - MEDICATION SUMMARY - MEDICATIONS TO TAKE
I will START or STAY ON the medications listed below when I get home from the hospital:    sertraline 100 mg oral tablet  -- 1 tab(s) by mouth once a day  -- Indication: For Depression    Plaquenil 200 mg oral tablet  -- 1 tab(s) by mouth 2 times a day  -- Indication: For SLE    ciprofloxacin 500 mg oral tablet  -- 1 tab(s) by mouth every 12 hours  -- Indication: For Salmonella enteritis I will START or STAY ON the medications listed below when I get home from the hospital:    sertraline 100 mg oral tablet  -- 1 tab(s) by mouth once a day  -- Indication: For Depression    loperamide 2 mg oral capsule  -- 1 cap(s) by mouth every 8 hours, As needed, Diarrhea  -- Indication: For Salmonella enteritis    Plaquenil 200 mg oral tablet  -- 1 tab(s) by mouth 2 times a day  -- Indication: For SLE    ciprofloxacin 500 mg oral tablet  -- 1 tab(s) by mouth every 12 hours  -- Indication: For Salmonella enteritis I will START or STAY ON the medications listed below when I get home from the hospital:    sertraline 100 mg oral tablet  -- 1 tab(s) by mouth once a day  -- Indication: For Depression    loperamide 2 mg oral capsule  -- 1 cap(s) by mouth every 8 hours, As needed, Diarrhea  -- Indication: For Sepsis    Plaquenil 200 mg oral tablet  -- 1 tab(s) by mouth 2 times a day  -- Indication: For SLE    ciprofloxacin 500 mg oral tablet  -- 1 tab(s) by mouth every 12 hours  -- Indication: For Sepsis

## 2018-06-13 NOTE — MEDICAL STUDENT PROGRESS NOTE(EDUCATION) - NS MD HP STUD ASPLAN ASSES FT
This is a 27-year old man with a PMHx of SLE who presented to the ER with fevers and diarrhea who met SIRS criteria who was found to have +salmonella, +epec stool cultures, and +GPC on Bcx.

## 2018-06-13 NOTE — PROGRESS NOTE ADULT - ASSESSMENT
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), APLS on mycophenolate and Plaquenil p/w fever, chills, abd pain and non bloody diarrhea, WBC was 13.9 and CT abd showed colitis of transverse, descending and sigmoid, he was started on cipro and flagyl but blood culture came back with 2/4 GPC in clusters, not identified by PCR, pt was started on dapto but the repeat cultures before starting the dapto were negative, and stool PCR showed salmonella sp and EPEC but the stool cx negative, pt on ceftriaxone, now diarrhea and abd pain resolved pt afebrile    sepsis, enterocolitis due to salmonella  blood cx was positive for coag neg staph likely contaminant, repeat negative        * DC ceftriaxone 2 g qd  * can switch to cipro 500 q 12 for total of 7 days now day 4  * DC Daptomycin
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), APLS on mycophenolate and Plaquenil p/w fever, chills, abd pain and non bloody diarrhea, WBC was 13.9 and CT abd showed colitis of transverse, descending and sigmoid, he was started on cipro and flagyl but blood culture came back with 4/4 GPC in clusters, not identified by PCR and stool PCR showed salmonella sp and EPEC    sepsis, enterocolitis due to salmonella, GPC in clusters bacteremia, repeat blood cx 6/11 negative to date  I called the lab and the smear was reviewed again and showed GPC in clusters no GNR and PCR negative      * c/w ceftriaxone 2 g qd  * c/w Daptomycin 8 mg/Kg qd  * monitor the CPK  * f/u the final blood cx  * echo
28 y/o M hx SLE c/b lupus nephritis, myocarditis, DVT/PE (2012), APLS on coumadin presents with sepsis secondary to Salmonella colitis.
26 y/o M hx SLE c/b lupus nephritis, myocarditis, DVT/PE (2012), APLS on coumadin presents with sepsis secondary to Salmonella colitis and GPC bacteremia.
28 y/o M hx SLE c/b lupus nephritis, myocarditis, DVT/PE (2012), APLS on coumadin presents with sepsis secondary to Salmonella colitis and GPC bacteremia.

## 2018-06-13 NOTE — DISCHARGE NOTE ADULT - CARE PLAN
Principal Discharge DX:	Salmonella enteritis  Goal:	Improvement in symptoms  Assessment and plan of treatment:	Please continue to take Ciprofloxacin 500 BID through 6/17. Stool cultures were positive for Salmonella this admission. Your symptoms improved with antibiotics and IV hydration. You should follow up with your PMD for further management.  Secondary Diagnosis:	Systemic lupus  Goal:	Outpatient management  Assessment and plan of treatment:	Please continue to take Plaquenil 200mg twice daily. Please follow up with Dr. Mcdonald within 1-2 weeks of discharge. Please resume Cellcept when your symptoms completely resolve.  Secondary Diagnosis:	Antiphospholipid antibody syndrome  Goal:	Outpatient management  Assessment and plan of treatment:	Please have your INR checked this week by Friday 6/12 in order to determine when to resume coumadin. The INR was 3.40 today and your levels should be within 2-3.

## 2018-06-13 NOTE — DISCHARGE NOTE ADULT - HOSPITAL COURSE
26 y/o M hx SLE c/b lupus nephritis, myocarditis, DVT/PE (2012), APLS on coumadin presents with sepsis secondary to Salmonella colitis and GPC bacteremia.    Patient presented with fever, diarrhea and abdominal pain. He met sepsis criteria (tachycardia up to 117, fever up to 104, WBC up to 13.9, suspected source of infection) and blood cultures were taken in the ER. His BMP demonstrated DANI which resolved with a 2x 1L fluid bolus in the ER. CT of abdomen revealed colitis of the transverse, descending, and the proximal portion of the sigmoid culture. Stool cultures sent and revealed salmonella and EPEC. He was initially started on IVF and Cipro/flagyl and was switched to ceftriaxone and dapto when GPC found on blood cultures.  During stay INR was supratherapeutic and warfarin was held. Morphine and Imodium were given PRN for pain and diarrhea control. The patient’s fever resolved by day 2 and diarrhea by day 4.  Cellcept was held in the setting of infection. Repeat blood cultures were negative, indicating that initial GPC bacteremia was likely contaminant. The patient improved and was discharged to home in stable medical condition.     Results of TTE:   Conclusions:  1. Mild segmental left ventricular systolic dysfunction.  Mild hypokinesis of the base to mid anteroseptal and  inferoseptal wall, mid to distal inferolateral wall.  Overall visual estimation of EF 50-55%.  2. Normal diastolic function  3. Normal right ventricular size and function.  4. No significant valvular disease or obvious vegitations  although cannot rule o 26 y/o M hx SLE c/b lupus nephritis, myocarditis, DVT/PE (2012), APLS on coumadin presents with sepsis secondary to Salmonella colitis and GPC bacteremia.    Patient presented with fever, diarrhea and abdominal pain. He met sepsis criteria (tachycardia up to 117, fever up to 104, WBC up to 13.9, suspected source of infection) and blood cultures were taken in the ER. His BMP demonstrated DANI which resolved with a 2x 1L fluid bolus in the ER. CT of abdomen revealed colitis of the transverse, descending, and the proximal portion of the sigmoid culture. Stool cultures sent and revealed salmonella and EPEC. He was initially started on IVF and Cipro/flagyl and was switched to ceftriaxone and dapto when GPC found on blood cultures.  During stay INR was supratherapeutic and warfarin was held. Morphine and Imodium were given PRN for pain and diarrhea control. The patient’s fever resolved by day 2 and diarrhea by day 4.  Cellcept was held in the setting of infection. Repeat blood cultures were negative, indicating that initial GPC bacteremia was likely contaminant. The patient improved and was discharged to home in stable medical condition.     Results of TTE:   Conclusions:  1. Mild segmental left ventricular systolic dysfunction.  Mild hypokinesis of the base to mid anteroseptal and  inferoseptal wall, mid to distal inferolateral wall.  Overall visual estimation of EF 50-55%.  2. Normal diastolic function  3. Normal right ventricular size and function.  4. No significant valvular disease or obvious vegitations  although cannot rule out endocarditis.

## 2018-06-13 NOTE — MEDICAL STUDENT PROGRESS NOTE(EDUCATION) - NS MD HP STUD ASPLAN PLAN FT
Bacteremia:  - f/u Bcx sensitivities   - continue dapto and ceftriaxone  -CK slightly elevated. Will continue to monitor.   - Follow fever curve  -TTE performed. Awaiting results.     Diarrhea:   - Much improved as per patient  - continue imodium PRN

## 2018-06-13 NOTE — MEDICAL STUDENT PROGRESS NOTE(EDUCATION) - SUBJECTIVE AND OBJECTIVE BOX
S: Patient notes that his diarrhea stopped last night and he has had three solid bowl movements. He also notes an increase in urine output. He says he has increased PO input. 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 He has been using his imodium to control symptoms. He denies F/C, HA, CP, SOB, N/V.     O:  Vital Signs Last 24 Hrs  T(C): 36.6 (13 Jun 2018 05:50), Max: 36.7 (12 Jun 2018 10:53)  T(F): 97.9 (13 Jun 2018 05:50), Max: 98.1 (12 Jun 2018 10:53)  HR: 86 (13 Jun 2018 05:50) (86 - 90)  BP: 108/77 (13 Jun 2018 05:50) (99/66 - 115/74)  BP(mean): --  RR: 18 (13 Jun 2018 05:50) (18 - 18)  SpO2: 97% (13 Jun 2018 05:50) (96% - 99%)    LABS:                        10.0   9.29  )-----------( 284      ( 12 Jun 2018 09:33 )             32.8     13 Jun 2018 06:21    143    |  106    |  7      ----------------------------<  81     4.1     |  24     |  0.83     Ca    8.8        13 Jun 2018 06:21      PT/INR - ( 12 Jun 2018 09:33 )   PT: 64.2 sec;   INR: 5.49 ratio         PHYSICAL EXAM:  GENERAL: NAD, well-developed  HEAD:  Atraumatic, Normocephalic  EYES: EOMI, PERRLA, conjunctiva and sclera clear  NECK: Supple  CHEST/LUNG: Clear to auscultation bilaterally; No wheeze  HEART: Regular rate and rhythm; No murmurs, rubs, or gallops  ABDOMEN: Soft, Nontender, Nondistended  EXTREMITIES: No edema

## 2018-06-13 NOTE — DISCHARGE NOTE ADULT - PLAN OF CARE
Improvement in symptoms Please continue to take Ciprofloxacin 500 BID through 6/17. Stool cultures were positive for Salmonella this admission. Your symptoms improved with antibiotics and IV hydration. You should follow up with your PMD for further management. Outpatient management Please continue to take Plaquenil 200mg twice daily. Please follow up with Dr. Mcdonald within 1-2 weeks of discharge. Please resume Cellcept when your symptoms completely resolve. Please have your INR checked this week by Friday 6/12 in order to determine when to resume coumadin. The INR was 3.40 today and your levels should be within 2-3.

## 2018-06-13 NOTE — PROGRESS NOTE ADULT - SUBJECTIVE AND OBJECTIVE BOX
Follow Up:  salmonella enterocolitis    Interval History: pt stable and afebrile, diarrhea resolved    ROS:      All other systems negative    Constitutional: no fever, no chills  Head: no trauma  Eyes: no vision changes, no eye pain  ENT:  no sore throat, no rhinorrhea  Cardiovascular:  no chest pain, no palpitation  Respiratory:  no SOB, no cough  GI:  no abd pain, no vomiting, resolved diarrhea  urinary: no dysuria, no hematuria, no flank pain  musculoskeletal:  no joint pain, no joint swelling  skin:  no rash  neurology:  no headache, no seizure, no change in mental status  psych: no anxiety, no depression         Allergies  Vancomycin Hydrochloride (Pruritus; Rash; Hives)        ANTIMICROBIALS:  cefTRIAXone   IVPB 2 <User Schedule>  DAPTOmycin IVPB 560 every 24 hours  hydroxychloroquine 200 every 12 hours      OTHER MEDS:  acetaminophen   Tablet 650 milliGRAM(s) Oral every 6 hours PRN  loperamide 2 milliGRAM(s) Oral every 8 hours PRN  morphine  - Injectable 2 milliGRAM(s) IV Push every 4 hours PRN  morphine  - Injectable 4 milliGRAM(s) IV Push every 6 hours PRN  sertraline 100 milliGRAM(s) Oral daily      Vital Signs Last 24 Hrs  T(C): 36.7 (13 Jun 2018 11:52), Max: 36.7 (12 Jun 2018 16:50)  T(F): 98.1 (13 Jun 2018 11:52), Max: 98.1 (12 Jun 2018 16:50)  HR: 88 (13 Jun 2018 11:52) (86 - 90)  BP: 110/74 (13 Jun 2018 11:52) (99/66 - 115/74)  BP(mean): --  RR: 18 (13 Jun 2018 11:52) (18 - 18)  SpO2: 98% (13 Jun 2018 11:52) (96% - 98%)    Physical Exam:    General:    NAD, non toxic, A&O x3  HEENT:   no oropharyngeal lesions, no LAD, neck supple  Cardio:    regular S1,S2, no murmur  Respiratory:   clear b/l, no wheezing  abd:   soft, BS +, resolved tenderness  :     no CVAT, no suprapubic tenderness, no lazo  Musculoskeletal : no joint swelling, no edema  Skin:    no rash  vascular: no phlebitis, normal pulses  Neurologic:     no focal deficits  psych: normal affect, no suicidal ideation                          9.5    8.85  )-----------( 322      ( 13 Jun 2018 07:41 )             32.0       06-13    143  |  106  |  7   ----------------------------<  81  4.1   |  24  |  0.83    Ca    8.8      13 Jun 2018 06:21            MICROBIOLOGY:  v  .Blood Blood-Peripheral  06-11-18   No growth to date.  --  --      .Stool Feces  06-10-18   No enteric pathogens isolated.  (Stool culture examined for Salmonella,  Shigella, Campylobacter, Aeromonas, Plesiomonas,  Vibrio, E.coli O157 and Yersinia)  No enteric gram negative rods isolated  Few Yeast like cells  --  --      .Stool Feces  06-10-18   No Protozoa seen by trichrome stain  No Helminths or Protozoa seen in formalin concentrate  performed by iodine stain  (routine O+P not evaluated for Microsporidia,  Cryptosporidia, Cyclospora, or Isospora.)  Note: One negative specimen does not rule  out the possibility of a parasitic infection.  --  --      .Urine Clean Catch (Midstream)  06-10-18   No growth  --  --      .Blood Blood-Peripheral  06-10-18   Growth in aerobic bottle: Coag Negative Staphylococcus  Single set isolate, possible contaminant. Contact  Microbiology if susceptibility testing clinically  indicated.  --  Blood Culture PCR  Coag Negative Staphylococcus          Rapid RVP Result: NotDetec (06-10 @ 04:50)    Clostridium difficile GDH Toxins A&amp;B, EIA:   Negative (06-10-18 @ 17:18)  Clostridium difficile GDH Interpretation: Negative for toxigenic C. Difficile.  This specimen is negative for C.  Difficile glutamate dehydrogenase (GDH) antigen and negative for C.  Difficile Toxins A & B, by EIA.  GDH is a highly sensitive screening  marker for C. Difficile that is produced in large amounts by all C.  Difficile strains, both toxigenic and nontoxigenic.  This assay has not  been validated as a test of cure.  Repeat testing during the same episode  of diarrhea is of limited value and is discouraged.  The results of this  assay should always be interpreted in conjunction with patient's clinical  history. (06-10-18 @ 17:18)      RADIOLOGY:    < from: CT Abdomen and Pelvis No Cont (06.10.18 @ 06:34) >  IMPRESSION: Mild wall thickening of the transverse, descending, and   proximal sigmoid colon which is compatible with colitis of   infectious/inflammatory etiology.
Follow Up:  salmonella enterocolitis and bacteremia due to GPC     Interval History: pt stable and afebrile, slightly better but still with diarrhea    ROS:      All other systems negative    Constitutional: no fever, no chills  Head: no trauma  Eyes: no vision changes, no eye pain  ENT:  no sore throat, no rhinorrhea  Cardiovascular:  no chest pain, no palpitation  Respiratory:  no SOB, no cough  GI:  improved abd pain, no vomiting, + diarrhea  urinary: no dysuria, no hematuria, no flank pain  musculoskeletal:  no joint pain, no joint swelling  skin:  no rash  neurology:  no headache, no seizure, no change in mental status  psych: no anxiety, no depression         Allergies  Vancomycin Hydrochloride (Pruritus; Rash; Hives)        ANTIMICROBIALS:  cefTRIAXone   IVPB 2 <User Schedule>  DAPTOmycin IVPB 560 every 24 hours  hydroxychloroquine 200 every 12 hours      OTHER MEDS:  acetaminophen   Tablet 650 milliGRAM(s) Oral every 6 hours PRN  loperamide 2 milliGRAM(s) Oral once  morphine  - Injectable 2 milliGRAM(s) IV Push every 4 hours PRN  morphine  - Injectable 4 milliGRAM(s) IV Push every 6 hours PRN  sertraline 100 milliGRAM(s) Oral daily  sodium chloride 0.9%. 1000 milliLiter(s) IV Continuous <Continuous>      Vital Signs Last 24 Hrs  T(C): 36.7 (12 Jun 2018 10:53), Max: 36.8 (11 Jun 2018 16:24)  T(F): 98.1 (12 Jun 2018 10:53), Max: 98.2 (11 Jun 2018 16:24)  HR: 89 (12 Jun 2018 10:53) (78 - 89)  BP: 111/77 (12 Jun 2018 10:53) (102/65 - 111/77)  BP(mean): --  RR: 18 (12 Jun 2018 10:53) (18 - 18)  SpO2: 99% (12 Jun 2018 10:53) (96% - 99%)    Physical Exam:    General:    NAD, non toxic, A&O x3  HEENT:   no oropharyngeal lesions, no LAD, neck supple  Cardio:    regular S1,S2, no murmur  Respiratory:   clear b/l, no wheezing  abd:   soft, BS +, slight diffuse tenderness  :     no CVAT, no suprapubic tenderness, no lazo  Musculoskeletal : no joint swelling, no edema  Skin:    no rash  vascular: no lines, normal pulses  Neurologic:     no focal deficits  psych: normal affect, no suicidal ideation                          10.0   9.29  )-----------( 284      ( 12 Jun 2018 09:33 )             32.8       06-12    139  |  104  |  <4<L>  ----------------------------<  82  3.6   |  24  |  0.84    Ca    8.7      12 Jun 2018 07:02  Phos  1.8     06-10  Mg     1.6     06-10            MICROBIOLOGY:  v  .Blood Blood-Peripheral  06-11-18   No growth to date.  --  --      .Stool Feces  06-10-18   No enteric pathogens to date: Final culture pending  No enteric gram negative rods isolated  Few Yeast  --  --      .Stool Feces  06-10-18   Testing in progress  --  --      .Urine Clean Catch (Midstream)  06-10-18   No growth  --  --      .Blood Blood-Peripheral  06-10-18   Growth in aerobic bottle: Gram Positive Cocci in Clusters  --  Blood Culture PCR          Rapid RVP Result: NotDetec (06-10 @ 04:50)    Clostridium difficile GD Toxins A&amp;B, EIA:   Negative (06-10-18 @ 17:18)  Clostridium difficile GD Interpretation: Negative for toxigenic C. Difficile.  This specimen is negative for C.    RADIOLOGY:    < from: CT Abdomen and Pelvis No Cont (06.10.18 @ 06:34) >  IMPRESSION: Mild wall thickening of the transverse, descending, and   proximal sigmoid colon which is compatible with colitis of   infectious/inflammatory etiology.
Patient is a 27y old  Male who presents with a chief complaint of Fevers, diarrhea (10 Shad 2018 09:34)      SUBJECTIVE / OVERNIGHT EVENTS: Overnight, patient reports continued diarrhea; roughly 10 watery bowel movements. He denies nausea or vomiting. He has been taking minimal oral intake, though he reports slight return of appetite this morning. Patient also was febrile overnight. He denies chest pain, shortness of breath, dizziness, or lightheadedness.     REVIEW OF SYSTEMS:    CONSTITUTIONAL: Endorses fevers.   EYES/ENT: No visual changes;  No vertigo or throat pain   NECK: No pain or stiffness  RESPIRATORY: No cough, wheezing, hemoptysis; No shortness of breath  CARDIOVASCULAR: No chest pain or palpitations  GASTROINTESTINAL: No abdominal or epigastric pain. No nausea, vomiting, or hematemesis; Endorses diarrhea. No melena or hematochezia.  GENITOURINARY: No dysuria, frequency or hematuria  NEUROLOGICAL: No numbness or weakness  SKIN: No itching, burning, rashes, or lesions   All other review of systems is negative unless indicated above.    MEDICATIONS  (STANDING):  ciprofloxacin   IVPB 400 milliGRAM(s) IV Intermittent every 12 hours  ciprofloxacin   IVPB      hydroxychloroquine 200 milliGRAM(s) Oral every 12 hours  metroNIDAZOLE  IVPB 500 milliGRAM(s) IV Intermittent every 8 hours  potassium chloride    Tablet ER 40 milliEquivalent(s) Oral every 4 hours  sodium chloride 0.9%. 1000 milliLiter(s) (100 mL/Hr) IV Continuous <Continuous>    MEDICATIONS  (PRN):  acetaminophen   Tablet 650 milliGRAM(s) Oral every 6 hours PRN For Temp greater than 38 C (100.4 F)  morphine  - Injectable 2 milliGRAM(s) IV Push every 4 hours PRN Moderate Pain (4 - 6)  morphine  - Injectable 4 milliGRAM(s) IV Push every 6 hours PRN Severe Pain (7 - 10)        CAPILLARY BLOOD GLUCOSE        I&O's Summary    10 Shad 2018 07:01  -  2018 07:00  --------------------------------------------------------  IN: 2765 mL / OUT: 325 mL / NET: 2440 mL        PHYSICAL EXAM:  GENERAL: NAD, tired appearing  HEAD:  Atraumatic, Normocephalic  EYES: EOMI, PERRLA, conjunctiva and sclera clear  NECK: Supple, No JVD  CHEST/LUNG: Clear to auscultation bilaterally; No wheeze  HEART: Regular rate and rhythm; No murmurs, rubs, or gallops  ABDOMEN: Soft, tender to palpation in lower quadrants, Nondistended; Bowel sounds present  EXTREMITIES:  2+ Peripheral Pulses, No clubbing, cyanosis, or edema  PSYCH: AAOx3  NEUROLOGY: non-focal  SKIN: No rashes or lesions    LABS:                        12.6   13.9  )-----------( 309      ( 10 Shad 2018 04:50 )             39.1     WBC Trend: 13.9<--  06-11    136  |  99  |  5<L>  ----------------------------<  115<H>  3.1<L>   |  22  |  1.04    Ca    8.5      2018 07:07  Phos  1.8     06-10  Mg     1.6     06-10    TPro  8.2  /  Alb  4.0  /  TBili  0.6  /  DBili  x   /  AST  19  /  ALT  9<L>  /  AlkPhos  99  06-10    Creatinine Trend: 1.04<--, 1.15<--, 1.42<--  PT/INR - ( 10 Shad 2018 04:50 )   PT: 57.5 sec;   INR: 5.15 ratio         PTT - ( 10 Shad 2018 04:50 )  PTT:54.2 sec      Urinalysis Basic - ( 10 Shad 2018 06:28 )    Color: Yellow / Appearance: Clear / S.016 / pH: x  Gluc: x / Ketone: Trace  / Bili: Negative / Urobili: Negative   Blood: x / Protein: 150 mg/dL / Nitrite: Negative   Leuk Esterase: Negative / RBC: 5-10 /HPF / WBC 6-10 /HPF   Sq Epi: x / Non Sq Epi: OCC /HPF / Bacteria: Few /HPF          RADIOLOGY & ADDITIONAL TESTS:    Imaging Personally Reviewed:    Consultant(s) Notes Reviewed:      Care Discussed with Consultants/Other Providers:
Patient is a 27y old  Male who presents with a chief complaint of Fevers, diarrhea (10 Shad 2018 09:34)      SUBJECTIVE / OVERNIGHT EVENTS: No acute events overnight. Patient reports three bowel movements which were formed overnight. Denies fevers/chills. Denies abdominal pain.     REVIEW OF SYSTEMS:    CONSTITUTIONAL: No weakness, fevers or chills  EYES/ENT: No visual changes;  No vertigo or throat pain   NECK: No pain or stiffness  RESPIRATORY: No cough, wheezing, hemoptysis; No shortness of breath  CARDIOVASCULAR: No chest pain or palpitations  GASTROINTESTINAL: No abdominal or epigastric pain. No nausea, vomiting, or hematemesis; No diarrhea or constipation. No melena or hematochezia.  GENITOURINARY: No dysuria, frequency or hematuria  NEUROLOGICAL: No numbness or weakness  SKIN: No itching, burning, rashes, or lesions   All other review of systems is negative unless indicated above.    MEDICATIONS  (STANDING):  cefTRIAXone   IVPB 2 Gram(s) IV Intermittent <User Schedule>  DAPTOmycin IVPB 560 milliGRAM(s) IV Intermittent every 24 hours  hydroxychloroquine 200 milliGRAM(s) Oral every 12 hours  sertraline 100 milliGRAM(s) Oral daily    MEDICATIONS  (PRN):  acetaminophen   Tablet 650 milliGRAM(s) Oral every 6 hours PRN For Temp greater than 38 C (100.4 F)  loperamide 2 milliGRAM(s) Oral every 8 hours PRN Diarrhea  morphine  - Injectable 2 milliGRAM(s) IV Push every 4 hours PRN Moderate Pain (4 - 6)  morphine  - Injectable 4 milliGRAM(s) IV Push every 6 hours PRN Severe Pain (7 - 10)        CAPILLARY BLOOD GLUCOSE        I&O's Summary    12 Jun 2018 07:01  -  13 Jun 2018 07:00  --------------------------------------------------------  IN: 4500 mL / OUT: 1000 mL / NET: 3500 mL    13 Jun 2018 07:01  -  13 Jun 2018 09:15  --------------------------------------------------------  IN: 200 mL / OUT: 0 mL / NET: 200 mL    Vital Signs Last 24 Hrs  T(C): 36.6 (13 Jun 2018 05:50), Max: 36.7 (12 Jun 2018 10:53)  T(F): 97.9 (13 Jun 2018 05:50), Max: 98.1 (12 Jun 2018 10:53)  HR: 86 (13 Jun 2018 05:50) (86 - 90)  BP: 108/77 (13 Jun 2018 05:50) (99/66 - 115/74)  BP(mean): --  RR: 18 (13 Jun 2018 05:50) (18 - 18)  SpO2: 97% (13 Jun 2018 05:50) (96% - 99%)    PHYSICAL EXAM:  GENERAL: NAD, well-developed  HEAD:  Atraumatic, Normocephalic  EYES: EOMI, PERRLA, conjunctiva and sclera clear  NECK: Supple, No JVD  CHEST/LUNG: Clear to auscultation bilaterally; No wheeze  HEART: Regular rate and rhythm; No murmurs, rubs, or gallops  ABDOMEN: Soft, Nontender, Nondistended; Bowel sounds present  EXTREMITIES:  2+ Peripheral Pulses, No clubbing, cyanosis, or edema  PSYCH: AAOx3  NEUROLOGY: non-focal  SKIN: No rashes or lesions    LABS:                        9.5    8.85  )-----------( 322      ( 13 Jun 2018 07:41 )             32.0     WBC Trend: 8.85<--, 9.29<--, 13.69<--  06-13    143  |  106  |  7   ----------------------------<  81  4.1   |  24  |  0.83    Ca    8.8      13 Jun 2018 06:21      Creatinine Trend: 0.83<--, 0.84<--, 1.04<--, 1.15<--, 1.42<--  PT/INR - ( 12 Jun 2018 09:33 )   PT: 64.2 sec;   INR: 5.49 ratio           CARDIAC MARKERS ( 13 Jun 2018 06:21 )  x     / x     / 221 U/L / x     / x                RADIOLOGY & ADDITIONAL TESTS:    Imaging Personally Reviewed:    Consultant(s) Notes Reviewed:  ID    Care Discussed with Consultants/Other Providers:
Patient is a 27y old  Male who presents with a chief complaint of Fevers, diarrhea (10 Shad 2018 09:34)      SUBJECTIVE / OVERNIGHT EVENTS: Overnight, patient reports continued watery diarrhea roughly seven times. Denies nausea or vomiting. He endorses crampy abdominal pain just prior to bowel movements. He is taking some oral intake of the liquid diet. He denies fevers, chills, sweats, or headache.     REVIEW OF SYSTEMS:    CONSTITUTIONAL: Endorses weakness. No fevers or chills  EYES/ENT: No visual changes;  No vertigo or throat pain   NECK: No pain or stiffness  RESPIRATORY: No cough, wheezing, hemoptysis; No shortness of breath  CARDIOVASCULAR: No chest pain or palpitations  GASTROINTESTINAL: No abdominal or epigastric pain. No nausea, vomiting, or hematemesis; Endorses diarrhea. No constipation. No melena or hematochezia.  GENITOURINARY: No dysuria, frequency or hematuria  NEUROLOGICAL: No numbness or weakness  SKIN: No itching, burning, rashes, or lesions   All other review of systems is negative unless indicated above.    MEDICATIONS  (STANDING):  cefTRIAXone   IVPB 2 Gram(s) IV Intermittent every 24 hours  DAPTOmycin IVPB 560 milliGRAM(s) IV Intermittent every 24 hours  hydroxychloroquine 200 milliGRAM(s) Oral every 12 hours  sertraline 100 milliGRAM(s) Oral daily  sodium chloride 0.9%. 1000 milliLiter(s) (100 mL/Hr) IV Continuous <Continuous>    MEDICATIONS  (PRN):  acetaminophen   Tablet 650 milliGRAM(s) Oral every 6 hours PRN For Temp greater than 38 C (100.4 F)  morphine  - Injectable 2 milliGRAM(s) IV Push every 4 hours PRN Moderate Pain (4 - 6)  morphine  - Injectable 4 milliGRAM(s) IV Push every 6 hours PRN Severe Pain (7 - 10)        CAPILLARY BLOOD GLUCOSE        I&O's Summary    11 Jun 2018 07:01  -  12 Jun 2018 07:00  --------------------------------------------------------  IN: 4320 mL / OUT: 0 mL / NET: 4320 mL    Vital Signs Last 24 Hrs  T(C): 36.7 (12 Jun 2018 05:06), Max: 39.2 (11 Jun 2018 09:50)  T(F): 98 (12 Jun 2018 05:06), Max: 102.6 (11 Jun 2018 09:50)  HR: 84 (12 Jun 2018 05:06) (78 - 106)  BP: 102/65 (12 Jun 2018 05:06) (102/65 - 111/72)  BP(mean): --  RR: 18 (12 Jun 2018 05:06) (18 - 18)  SpO2: 96% (12 Jun 2018 05:06) (96% - 99%)    PHYSICAL EXAM:  GENERAL: NAD, well-developed  HEAD:  Atraumatic, Normocephalic  EYES: EOMI, PERRLA, conjunctiva and sclera clear  NECK: Supple, No JVD  CHEST/LUNG: Clear to auscultation bilaterally; No wheeze  HEART: Regular rate and rhythm; No murmurs, rubs, or gallops  ABDOMEN: Soft, Nontender, Nondistended; Bowel sounds present  EXTREMITIES:  2+ Peripheral Pulses, No clubbing, cyanosis, or edema  PSYCH: AAOx3  NEUROLOGY: non-focal  SKIN: No rashes or lesions    LABS:                        10.1   13.69 )-----------( 258      ( 11 Jun 2018 08:09 )             32.7     WBC Trend: 13.69<--, 13.9<--  06-12    139  |  104  |  <4<L>  ----------------------------<  82  3.6   |  24  |  0.84    Ca    8.7      12 Jun 2018 07:02  Phos  1.8     06-10  Mg     1.6     06-10      Creatinine Trend: 0.84<--, 1.04<--, 1.15<--, 1.42<--  PT/INR - ( 11 Jun 2018 08:07 )   PT: 76.0 sec;   INR: 6.47 ratio                     RADIOLOGY & ADDITIONAL TESTS:    Imaging Personally Reviewed:    Consultant(s) Notes Reviewed:  ID    Care Discussed with Consultants/Other Providers:

## 2018-06-13 NOTE — PROGRESS NOTE ADULT - PROBLEM SELECTOR PLAN 5
INR pending  no signs or symptoms of bleeding or indication for reversal at this time  hold coumadin today and trend INR
INR pending  no signs or symptoms of bleeding or indication for reversal at this time  hold coumadin today and trend INR
INR currently supra-therapeutic  no signs or symptoms of bleeding or indication for reversal at this time  hold coumadin today and trend INR

## 2018-06-13 NOTE — DISCHARGE NOTE ADULT - MEDICATION SUMMARY - MEDICATIONS TO STOP TAKING
I will STOP taking the medications listed below when I get home from the hospital:    Coumadin 1 mg oral tablet  -- 6 tab(s) by mouth once a day (at bedtime)    lisinopril 10 mg oral tablet  -- 1 tab(s) by mouth once a day    Coreg CR 40 mg oral capsule, extended release  -- 1 cap(s) by mouth once a day (in the morning)    CellCept 500 mg oral tablet  -- 3 tab(s) by mouth once a day (in the evening)    CellCept 500 mg oral tablet  -- 2 tab(s) by mouth once a day (in the morning)

## 2018-06-13 NOTE — PROGRESS NOTE ADULT - PROBLEM SELECTOR PLAN 1
Patient reports improved symptoms in last one day. Stool cultures returned positive for Salmonella and EPEC. Appreciate ID recs. Patient escalated to ceftriaxone on 6/11. Patient was afebrile overnight and repeat blood cultures with no growth to date.  -Discontinued IVF as patient reports adequate fluid intake.  -continue with antibiotics as above Ceftriaxone 2g daily.  -stool culture positive for Salmonella  -C Diff negative   -follow up pending speciation/sensitivity  -serial abdominal examinations and stool count  -morphine prn severe pain

## 2018-06-13 NOTE — PROGRESS NOTE ADULT - PROBLEM SELECTOR PLAN 3
Resolved after intravenous hydration. Suspect pre-renal etiology secondary to sepsis and relative hypoperfusion. s/p 2L IVF and maintenance fluids.   -trend BUN/creatinine  -now endorsing copious urine output  -renally dose all medications

## 2018-06-13 NOTE — DISCHARGE NOTE ADULT - ADDITIONAL INSTRUCTIONS
1. Please resume CellCept at your home dose when your diarrhea completely resolves.    2. Please have your INR checked on 6/14 or 6/15 and dose coumadin based on your doctor's recommendations.     3. Please follow up with Dr. Mcdonald within 1-2 weeks.     4. Please complete course of antibiotics.

## 2018-06-13 NOTE — PROGRESS NOTE ADULT - PROBLEM SELECTOR PROBLEM 5
Antiphospholipid antibody syndrome

## 2018-06-13 NOTE — DISCHARGE NOTE ADULT - CARE PROVIDER_API CALL
Jose Mcdonald), Internal Medicine; Rheumatology  [address] [place] [address]  Phone: [phone]  Fax: [phone]

## 2018-06-13 NOTE — PROGRESS NOTE ADULT - PROBLEM SELECTOR PLAN 2
GPC bacteremia, sensitivities pending at this time.  -follow up pending blood cultures results  -trend fever curve and hemodynamics  -Tylenol PRN fever  -appreciate infectious disease recommendations  -c/w Daptomycin, CK mildly elevated at 220

## 2018-06-13 NOTE — PROGRESS NOTE ADULT - PROBLEM SELECTOR PLAN 4
-continue with home Plaquenil  -hold Cellcept in the setting of infection

## 2018-06-14 RX ORDER — CIPROFLOXACIN LACTATE 400MG/40ML
1 VIAL (ML) INTRAVENOUS
Qty: 8 | Refills: 0
Start: 2018-06-14 | End: 2018-06-17

## 2018-06-14 RX ORDER — LOPERAMIDE HCL 2 MG
1 TABLET ORAL
Qty: 5 | Refills: 0
Start: 2018-06-14

## 2018-06-24 ENCOUNTER — MEDICATION RENEWAL (OUTPATIENT)
Age: 28
End: 2018-06-24

## 2018-06-25 ENCOUNTER — APPOINTMENT (OUTPATIENT)
Dept: RHEUMATOLOGY | Facility: CLINIC | Age: 28
End: 2018-06-25

## 2018-07-10 ENCOUNTER — MEDICATION RENEWAL (OUTPATIENT)
Age: 28
End: 2018-07-10

## 2018-07-23 ENCOUNTER — APPOINTMENT (OUTPATIENT)
Dept: RHEUMATOLOGY | Facility: CLINIC | Age: 28
End: 2018-07-23

## 2018-08-03 ENCOUNTER — APPOINTMENT (OUTPATIENT)
Dept: UROLOGY | Facility: CLINIC | Age: 28
End: 2018-08-03
Payer: MEDICAID

## 2018-08-03 VITALS
TEMPERATURE: 98 F | HEART RATE: 99 BPM | SYSTOLIC BLOOD PRESSURE: 100 MMHG | DIASTOLIC BLOOD PRESSURE: 66 MMHG | OXYGEN SATURATION: 96 %

## 2018-08-03 PROCEDURE — 99204 OFFICE O/P NEW MOD 45 MIN: CPT

## 2018-08-05 LAB
APPEARANCE: CLEAR
BACTERIA: NEGATIVE
BILIRUBIN URINE: NEGATIVE
BLOOD URINE: NEGATIVE
COLOR: YELLOW
GLUCOSE QUALITATIVE U: NEGATIVE MG/DL
HYALINE CASTS: 4 /LPF
KETONES URINE: NEGATIVE
LEUKOCYTE ESTERASE URINE: NEGATIVE
MICROSCOPIC-UA: NORMAL
NITRITE URINE: NEGATIVE
PH URINE: 5.5
PROTEIN URINE: 30 MG/DL
RED BLOOD CELLS URINE: 7 /HPF
SPECIFIC GRAVITY URINE: 1.03
SQUAMOUS EPITHELIAL CELLS: 1 /HPF
UROBILINOGEN URINE: NEGATIVE MG/DL
WHITE BLOOD CELLS URINE: 2 /HPF

## 2018-08-06 ENCOUNTER — MEDICATION RENEWAL (OUTPATIENT)
Age: 28
End: 2018-08-06

## 2018-08-06 ENCOUNTER — OUTPATIENT (OUTPATIENT)
Dept: OUTPATIENT SERVICES | Facility: HOSPITAL | Age: 28
LOS: 1 days | Discharge: HOME | End: 2018-08-06

## 2018-08-06 RX ORDER — TADALAFIL 20 MG/1
20 TABLET, FILM COATED ORAL
Qty: 20 | Refills: 1 | Status: DISCONTINUED | COMMUNITY
Start: 2018-08-03 | End: 2018-08-06

## 2018-08-13 ENCOUNTER — APPOINTMENT (OUTPATIENT)
Dept: CARDIOLOGY | Facility: CLINIC | Age: 28
End: 2018-08-13
Payer: MEDICAID

## 2018-08-13 ENCOUNTER — APPOINTMENT (OUTPATIENT)
Dept: RHEUMATOLOGY | Facility: CLINIC | Age: 28
End: 2018-08-13

## 2018-08-13 VITALS
HEIGHT: 68 IN | OXYGEN SATURATION: 99 % | BODY MASS INDEX: 23.49 KG/M2 | SYSTOLIC BLOOD PRESSURE: 112 MMHG | WEIGHT: 155 LBS | HEART RATE: 85 BPM | DIASTOLIC BLOOD PRESSURE: 73 MMHG

## 2018-08-13 DIAGNOSIS — I50.22 CHRONIC SYSTOLIC (CONGESTIVE) HEART FAILURE: ICD-10-CM

## 2018-08-13 PROCEDURE — 99214 OFFICE O/P EST MOD 30 MIN: CPT

## 2018-08-13 RX ORDER — KETOCONAZOLE 20 MG/G
2 CREAM TOPICAL TWICE DAILY
Qty: 1 | Refills: 3 | Status: DISCONTINUED | COMMUNITY
Start: 2017-05-30 | End: 2018-08-13

## 2018-08-13 RX ORDER — METHYLPREDNISOLONE 2 MG/1
2 TABLET ORAL EVERY OTHER DAY
Qty: 30 | Refills: 3 | Status: DISCONTINUED | COMMUNITY
Start: 2017-07-25 | End: 2018-08-13

## 2018-08-13 RX ORDER — ESOMEPRAZOLE MAGNESIUM 40 MG/1
40 CAPSULE, DELAYED RELEASE ORAL
Qty: 30 | Refills: 3 | Status: DISCONTINUED | COMMUNITY
Start: 2017-06-30 | End: 2018-08-13

## 2018-08-13 NOTE — DISCHARGE NOTE ADULT - CONDITIONS AT DISCHARGE
POST INJECTION EVALUATION, no signs of new infection, tear, RD, VF, EOM, CNS, Vascular or other problems or side effect from previous injection(s). stable

## 2018-08-20 ENCOUNTER — EMERGENCY (EMERGENCY)
Facility: HOSPITAL | Age: 28
LOS: 1 days | Discharge: HOME | End: 2018-08-20
Attending: EMERGENCY MEDICINE
Payer: MEDICAID

## 2018-08-20 VITALS
TEMPERATURE: 98 F | SYSTOLIC BLOOD PRESSURE: 112 MMHG | RESPIRATION RATE: 16 BRPM | DIASTOLIC BLOOD PRESSURE: 73 MMHG | OXYGEN SATURATION: 100 % | HEART RATE: 80 BPM

## 2018-08-20 VITALS
OXYGEN SATURATION: 97 % | HEART RATE: 90 BPM | RESPIRATION RATE: 16 BRPM | TEMPERATURE: 98 F | DIASTOLIC BLOOD PRESSURE: 65 MMHG | SYSTOLIC BLOOD PRESSURE: 96 MMHG | WEIGHT: 154.98 LBS

## 2018-08-20 LAB
ALBUMIN SERPL ELPH-MCNC: 3.8 G/DL — SIGNIFICANT CHANGE UP (ref 3.3–5)
ALP SERPL-CCNC: 98 U/L — SIGNIFICANT CHANGE UP (ref 40–120)
ALT FLD-CCNC: 9 U/L — LOW (ref 10–45)
ANION GAP SERPL CALC-SCNC: 11 MMOL/L — SIGNIFICANT CHANGE UP (ref 5–17)
APTT BLD: 44.7 SEC — HIGH (ref 27.5–37.4)
AST SERPL-CCNC: 21 U/L — SIGNIFICANT CHANGE UP (ref 10–40)
BASOPHILS # BLD AUTO: 0.1 K/UL — SIGNIFICANT CHANGE UP (ref 0–0.2)
BASOPHILS NFR BLD AUTO: 0.8 % — SIGNIFICANT CHANGE UP (ref 0–2)
BILIRUB SERPL-MCNC: 0.6 MG/DL — SIGNIFICANT CHANGE UP (ref 0.2–1.2)
BLD GP AB SCN SERPL QL: NEGATIVE — SIGNIFICANT CHANGE UP
BUN SERPL-MCNC: 9 MG/DL — SIGNIFICANT CHANGE UP (ref 7–23)
CALCIUM SERPL-MCNC: 9.3 MG/DL — SIGNIFICANT CHANGE UP (ref 8.4–10.5)
CHLORIDE SERPL-SCNC: 102 MMOL/L — SIGNIFICANT CHANGE UP (ref 96–108)
CO2 SERPL-SCNC: 26 MMOL/L — SIGNIFICANT CHANGE UP (ref 22–31)
CREAT SERPL-MCNC: 0.9 MG/DL — SIGNIFICANT CHANGE UP (ref 0.5–1.3)
EOSINOPHIL # BLD AUTO: 0.1 K/UL — SIGNIFICANT CHANGE UP (ref 0–0.5)
EOSINOPHIL NFR BLD AUTO: 0.8 % — SIGNIFICANT CHANGE UP (ref 0–6)
GLUCOSE SERPL-MCNC: 83 MG/DL — SIGNIFICANT CHANGE UP (ref 70–99)
HCT VFR BLD CALC: 39.8 % — SIGNIFICANT CHANGE UP (ref 39–50)
HGB BLD-MCNC: 12.1 G/DL — LOW (ref 13–17)
INR BLD: 2.1 RATIO — HIGH (ref 0.88–1.16)
LYMPHOCYTES # BLD AUTO: 2.2 K/UL — SIGNIFICANT CHANGE UP (ref 1–3.3)
LYMPHOCYTES # BLD AUTO: 31.3 % — SIGNIFICANT CHANGE UP (ref 13–44)
MCHC RBC-ENTMCNC: 26.9 PG — LOW (ref 27–34)
MCHC RBC-ENTMCNC: 30.3 GM/DL — LOW (ref 32–36)
MCV RBC AUTO: 88.5 FL — SIGNIFICANT CHANGE UP (ref 80–100)
MONOCYTES # BLD AUTO: 0.6 K/UL — SIGNIFICANT CHANGE UP (ref 0–0.9)
MONOCYTES NFR BLD AUTO: 8.4 % — SIGNIFICANT CHANGE UP (ref 2–14)
NEUTROPHILS # BLD AUTO: 4.2 K/UL — SIGNIFICANT CHANGE UP (ref 1.8–7.4)
NEUTROPHILS NFR BLD AUTO: 58.6 % — SIGNIFICANT CHANGE UP (ref 43–77)
PLATELET # BLD AUTO: 287 K/UL — SIGNIFICANT CHANGE UP (ref 150–400)
POTASSIUM SERPL-MCNC: 3.7 MMOL/L — SIGNIFICANT CHANGE UP (ref 3.5–5.3)
POTASSIUM SERPL-SCNC: 3.7 MMOL/L — SIGNIFICANT CHANGE UP (ref 3.5–5.3)
PROT SERPL-MCNC: 7.6 G/DL — SIGNIFICANT CHANGE UP (ref 6–8.3)
PROTHROM AB SERPL-ACNC: 23.2 SEC — HIGH (ref 9.8–12.7)
RBC # BLD: 4.49 M/UL — SIGNIFICANT CHANGE UP (ref 4.2–5.8)
RBC # FLD: 16.3 % — HIGH (ref 10.3–14.5)
RH IG SCN BLD-IMP: POSITIVE — SIGNIFICANT CHANGE UP
SODIUM SERPL-SCNC: 139 MMOL/L — SIGNIFICANT CHANGE UP (ref 135–145)
WBC # BLD: 7.1 K/UL — SIGNIFICANT CHANGE UP (ref 3.8–10.5)
WBC # FLD AUTO: 7.1 K/UL — SIGNIFICANT CHANGE UP (ref 3.8–10.5)

## 2018-08-20 PROCEDURE — 99284 EMERGENCY DEPT VISIT MOD MDM: CPT

## 2018-08-20 PROCEDURE — 80053 COMPREHEN METABOLIC PANEL: CPT

## 2018-08-20 PROCEDURE — 99283 EMERGENCY DEPT VISIT LOW MDM: CPT

## 2018-08-20 PROCEDURE — 86901 BLOOD TYPING SEROLOGIC RH(D): CPT

## 2018-08-20 PROCEDURE — 85610 PROTHROMBIN TIME: CPT

## 2018-08-20 PROCEDURE — 85730 THROMBOPLASTIN TIME PARTIAL: CPT

## 2018-08-20 PROCEDURE — 85027 COMPLETE CBC AUTOMATED: CPT

## 2018-08-20 PROCEDURE — 86850 RBC ANTIBODY SCREEN: CPT

## 2018-08-20 PROCEDURE — 36415 COLL VENOUS BLD VENIPUNCTURE: CPT

## 2018-08-20 PROCEDURE — 86900 BLOOD TYPING SEROLOGIC ABO: CPT

## 2018-08-20 NOTE — ED ADULT NURSE NOTE - NSIMPLEMENTINTERV_GEN_ALL_ED
Implemented All Universal Safety Interventions:  [place] to call system. Call bell, personal items and telephone within reach. Instruct patient to call for assistance. Room bathroom lighting operational. Non-slip footwear when patient is off stretcher. Physically safe environment: no spills, clutter or unnecessary equipment. Stretcher in lowest position, wheels locked, appropriate side rails in place.

## 2018-08-20 NOTE — ED ADULT NURSE NOTE - OBJECTIVE STATEMENT
27 yo male with PMH Lupus on coumadin presents to the ED from home c/o high INR count. patient states he had routine blood work done Friday and was told to come to ED today. patient is AAOx3. lung sounds clear bilaterally. cap refill <3sec. patient chest pain, SOB, fevers, chills, N/V/D, HA, dizziness, cough, abdominal pain, numbness or tingling, edema. VSS. MD at the bedside.

## 2018-08-20 NOTE — ED PROVIDER NOTE - OBJECTIVE STATEMENT
28 yr old male hx of lupus here with outside result with an elevated INR 18; patient is on warfarin, with prior history of DVT; on warfarin, no increased bleeding, no changes.

## 2018-08-22 DIAGNOSIS — R76.8 OTHER SPECIFIED ABNORMAL IMMUNOLOGICAL FINDINGS IN SERUM: ICD-10-CM

## 2018-08-22 DIAGNOSIS — M32.9 SYSTEMIC LUPUS ERYTHEMATOSUS, UNSPECIFIED: ICD-10-CM

## 2018-08-22 DIAGNOSIS — F41.1 GENERALIZED ANXIETY DISORDER: ICD-10-CM

## 2018-08-22 DIAGNOSIS — I42.8 OTHER CARDIOMYOPATHIES: ICD-10-CM

## 2018-08-22 DIAGNOSIS — F32.9 MAJOR DEPRESSIVE DISORDER, SINGLE EPISODE, UNSPECIFIED: ICD-10-CM

## 2018-08-22 DIAGNOSIS — I82.409 ACUTE EMBOLISM AND THROMBOSIS OF UNSPECIFIED DEEP VEINS OF UNSPECIFIED LOWER EXTREMITY: ICD-10-CM

## 2018-09-04 ENCOUNTER — OUTPATIENT (OUTPATIENT)
Dept: OUTPATIENT SERVICES | Facility: HOSPITAL | Age: 28
LOS: 1 days | Discharge: HOME | End: 2018-09-04

## 2018-09-04 DIAGNOSIS — I80.9 PHLEBITIS AND THROMBOPHLEBITIS OF UNSPECIFIED SITE: ICD-10-CM

## 2018-09-07 ENCOUNTER — APPOINTMENT (OUTPATIENT)
Dept: UROLOGY | Facility: CLINIC | Age: 28
End: 2018-09-07

## 2018-09-17 ENCOUNTER — APPOINTMENT (OUTPATIENT)
Dept: RHEUMATOLOGY | Facility: CLINIC | Age: 28
End: 2018-09-17

## 2018-10-03 ENCOUNTER — LABORATORY RESULT (OUTPATIENT)
Age: 28
End: 2018-10-03

## 2018-10-03 ENCOUNTER — APPOINTMENT (OUTPATIENT)
Dept: HEMATOLOGY ONCOLOGY | Facility: CLINIC | Age: 28
End: 2018-10-03
Payer: MEDICAID

## 2018-10-03 ENCOUNTER — RESULT REVIEW (OUTPATIENT)
Age: 28
End: 2018-10-03

## 2018-10-03 VITALS
BODY MASS INDEX: 24.47 KG/M2 | DIASTOLIC BLOOD PRESSURE: 76 MMHG | TEMPERATURE: 98.7 F | OXYGEN SATURATION: 100 % | SYSTOLIC BLOOD PRESSURE: 116 MMHG | HEART RATE: 110 BPM | RESPIRATION RATE: 17 BRPM | WEIGHT: 160.93 LBS

## 2018-10-03 DIAGNOSIS — R76.8 OTHER SPECIFIED ABNORMAL IMMUNOLOGICAL FINDINGS IN SERUM: ICD-10-CM

## 2018-10-03 DIAGNOSIS — Z86.718 PERSONAL HISTORY OF OTHER VENOUS THROMBOSIS AND EMBOLISM: ICD-10-CM

## 2018-10-03 LAB
BASOPHILS # BLD AUTO: 0 K/UL — SIGNIFICANT CHANGE UP (ref 0–0.2)
BASOPHILS NFR BLD AUTO: 0.4 % — SIGNIFICANT CHANGE UP (ref 0–2)
EOSINOPHIL # BLD AUTO: 0 K/UL — SIGNIFICANT CHANGE UP (ref 0–0.5)
EOSINOPHIL NFR BLD AUTO: 0 % — SIGNIFICANT CHANGE UP (ref 0–6)
HCT VFR BLD CALC: 42.6 % — SIGNIFICANT CHANGE UP (ref 39–50)
HGB BLD-MCNC: 13.5 G/DL — SIGNIFICANT CHANGE UP (ref 13–17)
LYMPHOCYTES # BLD AUTO: 1.9 K/UL — SIGNIFICANT CHANGE UP (ref 1–3.3)
LYMPHOCYTES # BLD AUTO: 22.1 % — SIGNIFICANT CHANGE UP (ref 13–44)
MCHC RBC-ENTMCNC: 26.8 PG — LOW (ref 27–34)
MCHC RBC-ENTMCNC: 31.7 G/DL — LOW (ref 32–36)
MCV RBC AUTO: 84.4 FL — SIGNIFICANT CHANGE UP (ref 80–100)
MONOCYTES # BLD AUTO: 0.9 K/UL — SIGNIFICANT CHANGE UP (ref 0–0.9)
MONOCYTES NFR BLD AUTO: 9.9 % — SIGNIFICANT CHANGE UP (ref 2–14)
NEUTROPHILS # BLD AUTO: 5.8 K/UL — SIGNIFICANT CHANGE UP (ref 1.8–7.4)
NEUTROPHILS NFR BLD AUTO: 67.6 % — SIGNIFICANT CHANGE UP (ref 43–77)
PLATELET # BLD AUTO: 306 K/UL — SIGNIFICANT CHANGE UP (ref 150–400)
RBC # BLD: 5.04 M/UL — SIGNIFICANT CHANGE UP (ref 4.2–5.8)
RBC # FLD: 14.2 % — SIGNIFICANT CHANGE UP (ref 10.3–14.5)
WBC # BLD: 8.6 K/UL — SIGNIFICANT CHANGE UP (ref 3.8–10.5)
WBC # FLD AUTO: 8.6 K/UL — SIGNIFICANT CHANGE UP (ref 3.8–10.5)

## 2018-10-03 PROCEDURE — 99214 OFFICE O/P EST MOD 30 MIN: CPT

## 2018-10-19 ENCOUNTER — APPOINTMENT (OUTPATIENT)
Dept: RHEUMATOLOGY | Facility: CLINIC | Age: 28
End: 2018-10-19

## 2018-10-23 ENCOUNTER — RX RENEWAL (OUTPATIENT)
Age: 28
End: 2018-10-23

## 2018-11-12 ENCOUNTER — APPOINTMENT (OUTPATIENT)
Dept: RHEUMATOLOGY | Facility: CLINIC | Age: 28
End: 2018-11-12

## 2018-12-10 ENCOUNTER — RX CHANGE (OUTPATIENT)
Age: 28
End: 2018-12-10

## 2018-12-10 RX ORDER — CARVEDILOL PHOSPHATE 40 MG/1
40 CAPSULE, EXTENDED RELEASE ORAL DAILY
Qty: 90 | Refills: 3 | Status: DISCONTINUED | COMMUNITY
Start: 2018-08-13 | End: 2018-12-10

## 2018-12-10 RX ORDER — CARVEDILOL 25 MG/1
25 TABLET, FILM COATED ORAL TWICE DAILY
Qty: 60 | Refills: 5 | Status: DISCONTINUED | COMMUNITY
Start: 2018-10-25 | End: 2018-12-10

## 2018-12-13 ENCOUNTER — APPOINTMENT (OUTPATIENT)
Dept: RHEUMATOLOGY | Facility: CLINIC | Age: 28
End: 2018-12-13

## 2018-12-26 ENCOUNTER — APPOINTMENT (OUTPATIENT)
Dept: CARDIOLOGY | Facility: CLINIC | Age: 28
End: 2018-12-26

## 2019-01-11 ENCOUNTER — APPOINTMENT (OUTPATIENT)
Dept: RHEUMATOLOGY | Facility: CLINIC | Age: 29
End: 2019-01-11

## 2019-01-11 ENCOUNTER — LABORATORY RESULT (OUTPATIENT)
Age: 29
End: 2019-01-11

## 2019-02-04 ENCOUNTER — APPOINTMENT (OUTPATIENT)
Dept: RHEUMATOLOGY | Facility: CLINIC | Age: 29
End: 2019-02-04

## 2019-02-06 ENCOUNTER — APPOINTMENT (OUTPATIENT)
Dept: CARDIOLOGY | Facility: CLINIC | Age: 29
End: 2019-02-06

## 2019-03-05 ENCOUNTER — APPOINTMENT (OUTPATIENT)
Dept: RHEUMATOLOGY | Facility: CLINIC | Age: 29
End: 2019-03-05

## 2019-03-21 ENCOUNTER — RX RENEWAL (OUTPATIENT)
Age: 29
End: 2019-03-21

## 2019-03-21 ENCOUNTER — MEDICATION RENEWAL (OUTPATIENT)
Age: 29
End: 2019-03-21

## 2019-03-29 ENCOUNTER — TRANSCRIPTION ENCOUNTER (OUTPATIENT)
Age: 29
End: 2019-03-29

## 2019-04-01 ENCOUNTER — APPOINTMENT (OUTPATIENT)
Dept: RHEUMATOLOGY | Facility: CLINIC | Age: 29
End: 2019-04-01

## 2019-04-03 ENCOUNTER — RX RENEWAL (OUTPATIENT)
Age: 29
End: 2019-04-03

## 2019-04-05 ENCOUNTER — APPOINTMENT (OUTPATIENT)
Dept: UROLOGY | Facility: CLINIC | Age: 29
End: 2019-04-05
Payer: MEDICAID

## 2019-04-05 VITALS
SYSTOLIC BLOOD PRESSURE: 107 MMHG | WEIGHT: 173 LBS | HEART RATE: 81 BPM | BODY MASS INDEX: 26.22 KG/M2 | HEIGHT: 68 IN | DIASTOLIC BLOOD PRESSURE: 68 MMHG

## 2019-04-05 PROCEDURE — 99213 OFFICE O/P EST LOW 20 MIN: CPT

## 2019-04-05 NOTE — HISTORY OF PRESENT ILLNESS
[FreeTextEntry1] : Very pleasant 28-year-old gentleman presents for followup of erectile dysfunction. He reports using sildenafil with good improvement in his erections. He denies problems with the medication. No side effects. No other complaints. [Urinary Retention] : no urinary retention [Urinary Urgency] : no urinary urgency [Urinary Frequency] : no urinary frequency [Nocturia] : no nocturia [Straining] : no straining [Weak Stream] : no weak stream [Erectile Dysfunction] : Erectile Dysfunction [Dysuria] : no dysuria [Hematuria - Gross] : no gross hematuria [Bladder Spasm] : no bladder spasm [Abdominal Pain] : no abdominal pain [Flank Pain] : no flank pain [Fever] : no fever [Fatigue] : no fatigue [Nausea] : no nausea [Anorexia] : no anorexia

## 2019-04-05 NOTE — ASSESSMENT
[FreeTextEntry1] : Very pleasant 28-year-old gentleman sent for followup of erectile dysfunction\par -Refill for sildenafil provided\par -Follow up in 6 months

## 2019-04-24 ENCOUNTER — RX RENEWAL (OUTPATIENT)
Age: 29
End: 2019-04-24

## 2019-05-02 ENCOUNTER — APPOINTMENT (OUTPATIENT)
Dept: RHEUMATOLOGY | Facility: CLINIC | Age: 29
End: 2019-05-02

## 2019-06-06 ENCOUNTER — APPOINTMENT (OUTPATIENT)
Dept: RHEUMATOLOGY | Facility: CLINIC | Age: 29
End: 2019-06-06

## 2019-06-24 ENCOUNTER — APPOINTMENT (OUTPATIENT)
Dept: RHEUMATOLOGY | Facility: CLINIC | Age: 29
End: 2019-06-24

## 2019-07-06 ENCOUNTER — RX RENEWAL (OUTPATIENT)
Age: 29
End: 2019-07-06

## 2019-07-08 ENCOUNTER — RX RENEWAL (OUTPATIENT)
Age: 29
End: 2019-07-08

## 2019-07-10 ENCOUNTER — APPOINTMENT (OUTPATIENT)
Dept: CARDIOLOGY | Facility: CLINIC | Age: 29
End: 2019-07-10

## 2019-08-23 ENCOUNTER — MEDICATION RENEWAL (OUTPATIENT)
Age: 29
End: 2019-08-23

## 2019-09-27 ENCOUNTER — APPOINTMENT (OUTPATIENT)
Dept: RHEUMATOLOGY | Facility: CLINIC | Age: 29
End: 2019-09-27

## 2019-10-03 ENCOUNTER — APPOINTMENT (OUTPATIENT)
Dept: DERMATOLOGY | Facility: CLINIC | Age: 29
End: 2019-10-03

## 2019-10-08 ENCOUNTER — APPOINTMENT (OUTPATIENT)
Dept: UROLOGY | Facility: CLINIC | Age: 29
End: 2019-10-08

## 2019-10-21 ENCOUNTER — APPOINTMENT (OUTPATIENT)
Dept: RHEUMATOLOGY | Facility: CLINIC | Age: 29
End: 2019-10-21

## 2019-10-22 ENCOUNTER — APPOINTMENT (OUTPATIENT)
Dept: RHEUMATOLOGY | Facility: CLINIC | Age: 29
End: 2019-10-22

## 2019-10-28 ENCOUNTER — APPOINTMENT (OUTPATIENT)
Dept: RHEUMATOLOGY | Facility: CLINIC | Age: 29
End: 2019-10-28

## 2020-01-10 ENCOUNTER — MEDICATION RENEWAL (OUTPATIENT)
Age: 30
End: 2020-01-10

## 2020-02-03 ENCOUNTER — LABORATORY RESULT (OUTPATIENT)
Age: 30
End: 2020-02-03

## 2020-02-03 ENCOUNTER — APPOINTMENT (OUTPATIENT)
Dept: RHEUMATOLOGY | Facility: CLINIC | Age: 30
End: 2020-02-03
Payer: MEDICAID

## 2020-02-03 PROCEDURE — 36415 COLL VENOUS BLD VENIPUNCTURE: CPT

## 2020-02-03 PROCEDURE — 96413 CHEMO IV INFUSION 1 HR: CPT

## 2020-03-05 ENCOUNTER — LABORATORY RESULT (OUTPATIENT)
Age: 30
End: 2020-03-05

## 2020-03-05 ENCOUNTER — APPOINTMENT (OUTPATIENT)
Dept: RHEUMATOLOGY | Facility: CLINIC | Age: 30
End: 2020-03-05
Payer: MEDICAID

## 2020-03-05 PROCEDURE — 96413 CHEMO IV INFUSION 1 HR: CPT

## 2020-03-05 PROCEDURE — 36415 COLL VENOUS BLD VENIPUNCTURE: CPT

## 2020-04-01 ENCOUNTER — LABORATORY RESULT (OUTPATIENT)
Age: 30
End: 2020-04-01

## 2020-04-01 ENCOUNTER — APPOINTMENT (OUTPATIENT)
Dept: RHEUMATOLOGY | Facility: CLINIC | Age: 30
End: 2020-04-01
Payer: MEDICAID

## 2020-04-01 PROCEDURE — 36415 COLL VENOUS BLD VENIPUNCTURE: CPT

## 2020-04-01 PROCEDURE — 96413 CHEMO IV INFUSION 1 HR: CPT

## 2020-05-09 ENCOUNTER — LABORATORY RESULT (OUTPATIENT)
Age: 30
End: 2020-05-09

## 2020-05-09 ENCOUNTER — APPOINTMENT (OUTPATIENT)
Dept: RHEUMATOLOGY | Facility: CLINIC | Age: 30
End: 2020-05-09
Payer: MEDICAID

## 2020-05-09 PROCEDURE — 36415 COLL VENOUS BLD VENIPUNCTURE: CPT

## 2020-05-09 PROCEDURE — 96413 CHEMO IV INFUSION 1 HR: CPT

## 2020-05-29 ENCOUNTER — APPOINTMENT (OUTPATIENT)
Dept: RHEUMATOLOGY | Facility: CLINIC | Age: 30
End: 2020-05-29

## 2020-06-04 ENCOUNTER — LABORATORY RESULT (OUTPATIENT)
Age: 30
End: 2020-06-04

## 2020-06-04 ENCOUNTER — APPOINTMENT (OUTPATIENT)
Dept: RHEUMATOLOGY | Facility: CLINIC | Age: 30
End: 2020-06-04
Payer: MEDICAID

## 2020-06-04 PROCEDURE — 36415 COLL VENOUS BLD VENIPUNCTURE: CPT

## 2020-06-04 PROCEDURE — 96413 CHEMO IV INFUSION 1 HR: CPT

## 2020-07-02 ENCOUNTER — LABORATORY RESULT (OUTPATIENT)
Age: 30
End: 2020-07-02

## 2020-07-02 ENCOUNTER — APPOINTMENT (OUTPATIENT)
Dept: RHEUMATOLOGY | Facility: CLINIC | Age: 30
End: 2020-07-02
Payer: MEDICAID

## 2020-07-02 PROCEDURE — 36415 COLL VENOUS BLD VENIPUNCTURE: CPT

## 2020-07-02 PROCEDURE — 96413 CHEMO IV INFUSION 1 HR: CPT

## 2020-07-09 ENCOUNTER — RX RENEWAL (OUTPATIENT)
Age: 30
End: 2020-07-09

## 2020-07-10 ENCOUNTER — APPOINTMENT (OUTPATIENT)
Dept: UROLOGY | Facility: CLINIC | Age: 30
End: 2020-07-10
Payer: MEDICAID

## 2020-07-10 VITALS
BODY MASS INDEX: 29.55 KG/M2 | SYSTOLIC BLOOD PRESSURE: 117 MMHG | WEIGHT: 195 LBS | RESPIRATION RATE: 16 BRPM | DIASTOLIC BLOOD PRESSURE: 80 MMHG | HEIGHT: 68 IN | TEMPERATURE: 98.1 F | HEART RATE: 86 BPM

## 2020-07-10 DIAGNOSIS — M31.0 HYPERSENSITIVITY ANGIITIS: ICD-10-CM

## 2020-07-10 PROCEDURE — 99213 OFFICE O/P EST LOW 20 MIN: CPT

## 2020-07-10 NOTE — HISTORY OF PRESENT ILLNESS
[FreeTextEntry1] : Very pleasant 30-year-old gentleman who presents for follow-up of erectile dysfunction and new concerns for fertility.  He reports that sildenafil has significantly improved his erections.  He reports using 80 mg of sildenafil at a time.  This produces a strong erection.  He reports no side effects from the medication outside of an occasional headache.  This is not significantly bothersome.  He reports no prolonged erections.  No other complaints.  \par \par Additionally, he presents today to discuss concerns for fertility.  He reports that he has not yet tried to have a child.  He reports that on 2 occasions in the past a partner of his has had a miscarriage.  In these instances he was not trying to achieve pregnancy.  He does not have any children currently. [Urinary Retention] : no urinary retention [Urinary Urgency] : no urinary urgency [Nocturia] : no nocturia [Straining] : no straining [Urinary Frequency] : no urinary frequency [Dysuria] : no dysuria [Weak Stream] : no weak stream [Erectile Dysfunction] : Erectile Dysfunction [Hematuria - Gross] : no gross hematuria [Bladder Spasm] : no bladder spasm [Abdominal Pain] : no abdominal pain [Fever] : no fever [Flank Pain] : no flank pain [Nausea] : no nausea [Fatigue] : no fatigue [Anorexia] : no anorexia

## 2020-07-10 NOTE — ASSESSMENT
[FreeTextEntry1] : Very pleasant 30-year-old gentleman who presents for follow-up of erectile dysfunction and new concern for fertility\par -Refill for sildenafil sent to the pharmacy\par -We discussed options for work-up of infertility; we discussed that the fact that he has achieved a pregnancy in the past is a good sign that he will be able to achieve pregnancy in the future\par -We discussed the work-up for infertility to include a semen analysis and blood work if he is not able to have children when he decides to do so\par -We will forego a work-up at this time as he is not actively trying to achieve pregnancy\par -Follow-up in 1 year

## 2020-07-10 NOTE — PHYSICAL EXAM
[General Appearance - Well Developed] : well developed [General Appearance - Well Nourished] : well nourished [Normal Appearance] : normal appearance [Well Groomed] : well groomed [General Appearance - In No Acute Distress] : no acute distress [Abdomen Soft] : soft [Costovertebral Angle Tenderness] : no ~M costovertebral angle tenderness [Abdomen Tenderness] : non-tender [Urinary Bladder Findings] : the bladder was normal on palpation [Edema] : no peripheral edema [] : no respiratory distress [Respiration, Rhythm And Depth] : normal respiratory rhythm and effort [Exaggerated Use Of Accessory Muscles For Inspiration] : no accessory muscle use [Oriented To Time, Place, And Person] : oriented to person, place, and time [Affect] : the affect was normal [Mood] : the mood was normal [Not Anxious] : not anxious [Normal Station and Gait] : the gait and station were normal for the patient's age [No Focal Deficits] : no focal deficits [No Palpable Adenopathy] : no palpable adenopathy

## 2020-07-29 ENCOUNTER — LABORATORY RESULT (OUTPATIENT)
Age: 30
End: 2020-07-29

## 2020-07-29 ENCOUNTER — APPOINTMENT (OUTPATIENT)
Dept: RHEUMATOLOGY | Facility: CLINIC | Age: 30
End: 2020-07-29
Payer: MEDICAID

## 2020-07-29 PROCEDURE — 96413 CHEMO IV INFUSION 1 HR: CPT

## 2020-07-29 PROCEDURE — 36415 COLL VENOUS BLD VENIPUNCTURE: CPT

## 2020-08-27 ENCOUNTER — LABORATORY RESULT (OUTPATIENT)
Age: 30
End: 2020-08-27

## 2020-08-27 ENCOUNTER — APPOINTMENT (OUTPATIENT)
Dept: RHEUMATOLOGY | Facility: CLINIC | Age: 30
End: 2020-08-27
Payer: MEDICAID

## 2020-08-27 PROCEDURE — 36415 COLL VENOUS BLD VENIPUNCTURE: CPT

## 2020-08-27 PROCEDURE — 96413 CHEMO IV INFUSION 1 HR: CPT

## 2020-09-16 NOTE — DISCHARGE NOTE ADULT - PATIENT PORTAL LINK FT
The following letter pertains to your most recent diagnostic tests:    Good news! The E. Coli that grew in your urine culture should respond to the Bactrim previously prescribed.  Please complete a 10 day course.      Sincerely,    Dr. Chavez
You can access the [employer] Patient Portal, offered by [facility], by registering with the following website: [email or web address]

## 2020-09-24 NOTE — ED PROVIDER NOTE - NS ED ATTENDING STATEMENT MOD
I have personally seen and examined this patient.  I have fully participated in the care of this patient. I have reviewed all pertinent clinical information, including history, physical exam, plan and the Resident’s note and agree except as noted.
No

## 2020-10-13 ENCOUNTER — LABORATORY RESULT (OUTPATIENT)
Age: 30
End: 2020-10-13

## 2020-10-13 ENCOUNTER — APPOINTMENT (OUTPATIENT)
Dept: RHEUMATOLOGY | Facility: CLINIC | Age: 30
End: 2020-10-13
Payer: MEDICAID

## 2020-10-13 PROCEDURE — 36415 COLL VENOUS BLD VENIPUNCTURE: CPT

## 2020-10-13 PROCEDURE — 96413 CHEMO IV INFUSION 1 HR: CPT

## 2020-10-16 ENCOUNTER — OUTPATIENT (OUTPATIENT)
Dept: OUTPATIENT SERVICES | Facility: HOSPITAL | Age: 30
LOS: 1 days | Discharge: HOME | End: 2020-10-16

## 2020-10-16 DIAGNOSIS — I80.9 PHLEBITIS AND THROMBOPHLEBITIS OF UNSPECIFIED SITE: ICD-10-CM

## 2020-10-30 ENCOUNTER — APPOINTMENT (OUTPATIENT)
Dept: HEART FAILURE | Facility: CLINIC | Age: 30
End: 2020-10-30

## 2020-11-10 ENCOUNTER — LABORATORY RESULT (OUTPATIENT)
Age: 30
End: 2020-11-10

## 2020-11-10 ENCOUNTER — APPOINTMENT (OUTPATIENT)
Dept: RHEUMATOLOGY | Facility: CLINIC | Age: 30
End: 2020-11-10
Payer: MEDICAID

## 2020-11-10 DIAGNOSIS — Z23 ENCOUNTER FOR IMMUNIZATION: ICD-10-CM

## 2020-11-10 PROCEDURE — 36415 COLL VENOUS BLD VENIPUNCTURE: CPT

## 2020-11-10 PROCEDURE — 99072 ADDL SUPL MATRL&STAF TM PHE: CPT

## 2020-11-10 PROCEDURE — 96413 CHEMO IV INFUSION 1 HR: CPT

## 2020-11-24 ENCOUNTER — APPOINTMENT (OUTPATIENT)
Dept: RHEUMATOLOGY | Facility: CLINIC | Age: 30
End: 2020-11-24

## 2020-12-08 ENCOUNTER — LABORATORY RESULT (OUTPATIENT)
Age: 30
End: 2020-12-08

## 2020-12-08 ENCOUNTER — APPOINTMENT (OUTPATIENT)
Dept: RHEUMATOLOGY | Facility: CLINIC | Age: 30
End: 2020-12-08
Payer: MEDICAID

## 2020-12-08 PROCEDURE — 36415 COLL VENOUS BLD VENIPUNCTURE: CPT

## 2020-12-08 PROCEDURE — 99072 ADDL SUPL MATRL&STAF TM PHE: CPT

## 2020-12-08 PROCEDURE — 96413 CHEMO IV INFUSION 1 HR: CPT

## 2021-01-01 NOTE — PATIENT PROFILE ADULT. - ANESTHESIA, PREVIOUS REACTION, PROFILE
Onset: eating less last 2 days, no BM last 2 days     Location/description:   Father calling with concerns of pt eating less last 2 days and no BM over the last 2 days.     Pt was typically eating every 2-3 hours and taking 50-60 ml or either breast milk or formula from a bottle. Typically gets either all breast milk or all formula per feeding.   Last 2 days, pt been drinking every 5-6 hours but still taking 50-60 ml.   Pt took about 50-60 ml at 0500. Since 0800, pt has only has between 10-20 ml.   After that, at 0800 to now only has taken 10-20 ml.   Pt has been doing a lot of gagging at coughing at onset of feedings, no actual vomting and is tolerating feedings afterwards. Seems to want to tongue out the nipple more lately.   Pt wakes up crying at times, more fussy and irritable. Will sleep at times too. No fever. Will respond and wake to stimulation, makes eye contact. No difficulty breathing. Seeing dried nasal secretions and noticing intermittent cough at times.  Tummy seems soft and nontender.     Associated Symptoms: as above  What improves/worsens symptoms: na/na  Symptom specific medications:   Simethicone last night     Intake and Output: Eating less frequently but still taking same volumes. Normal wet diapers. No Bm for last 2 days.   Activity level: Sleeping now, more fussy and irritable at times but consolable.   Temperature (route and time): denies fever  Weight:   Wt Readings from Last 1 Encounters:   02/19/21 3.019 kg (8 %, Z= -1.42)*     * Growth percentiles are based on WHO (Girls, 0-2 years) data.        Recent visits (last 3-4 weeks) for same reason or recent surgery:  none  Did the patient have a positive coronavirus screening?: No    PLAN:  See/Call Provider within 24 hours    Caller agrees to follow recommendations.    Reason for Disposition  • [1] Drinking less than normal AND [2] present > 3 days  • [1] Mild constipation AND [2] age > 1 year old (all triage questions negative)    Protocols  used: FLUID INTAKE LYLSDRAYU-L-UK, CONSTIPATION-P-AH       none

## 2021-01-05 ENCOUNTER — LABORATORY RESULT (OUTPATIENT)
Age: 31
End: 2021-01-05

## 2021-01-06 ENCOUNTER — APPOINTMENT (OUTPATIENT)
Dept: RHEUMATOLOGY | Facility: CLINIC | Age: 31
End: 2021-01-06
Payer: MEDICAID

## 2021-01-06 PROCEDURE — 96413 CHEMO IV INFUSION 1 HR: CPT

## 2021-01-06 PROCEDURE — 99072 ADDL SUPL MATRL&STAF TM PHE: CPT

## 2021-01-06 PROCEDURE — 36415 COLL VENOUS BLD VENIPUNCTURE: CPT

## 2021-01-07 ENCOUNTER — OUTPATIENT (OUTPATIENT)
Dept: OUTPATIENT SERVICES | Facility: HOSPITAL | Age: 31
LOS: 1 days | Discharge: HOME | End: 2021-01-07

## 2021-01-07 DIAGNOSIS — I80.9 PHLEBITIS AND THROMBOPHLEBITIS OF UNSPECIFIED SITE: ICD-10-CM

## 2021-01-11 ENCOUNTER — APPOINTMENT (OUTPATIENT)
Dept: HEMATOLOGY ONCOLOGY | Facility: CLINIC | Age: 31
End: 2021-01-11

## 2021-02-02 ENCOUNTER — APPOINTMENT (OUTPATIENT)
Dept: RHEUMATOLOGY | Facility: CLINIC | Age: 31
End: 2021-02-02

## 2021-02-03 ENCOUNTER — APPOINTMENT (OUTPATIENT)
Dept: HEART FAILURE | Facility: CLINIC | Age: 31
End: 2021-02-03
Payer: MEDICAID

## 2021-02-03 ENCOUNTER — NON-APPOINTMENT (OUTPATIENT)
Age: 31
End: 2021-02-03

## 2021-02-03 VITALS
SYSTOLIC BLOOD PRESSURE: 99 MMHG | HEART RATE: 73 BPM | DIASTOLIC BLOOD PRESSURE: 63 MMHG | BODY MASS INDEX: 29.55 KG/M2 | TEMPERATURE: 98 F | WEIGHT: 195 LBS | RESPIRATION RATE: 16 BRPM | HEIGHT: 68 IN | OXYGEN SATURATION: 100 %

## 2021-02-03 PROCEDURE — 99072 ADDL SUPL MATRL&STAF TM PHE: CPT

## 2021-02-03 PROCEDURE — 99214 OFFICE O/P EST MOD 30 MIN: CPT

## 2021-02-03 RX ORDER — SERTRALINE HYDROCHLORIDE 100 MG/1
100 TABLET, FILM COATED ORAL DAILY
Qty: 90 | Refills: 3 | Status: DISCONTINUED | COMMUNITY
Start: 2018-01-08 | End: 2021-02-03

## 2021-02-03 NOTE — DISCUSSION/SUMMARY
[FreeTextEntry1] : # HF with recovered ejection fraction\par - repeat TTE with Dopplers this month, will have low threshold to repeat cardiac MRI if LV function borderline\par - his neurohormonal regimen at this time is metoprolol succinate 200 mg daily and lisinopril 10 mg daily. will continue at this time and uptitrate further if any signs of continued cardiomyopathy \par \par # Palpitations\par - very rare/intermittent, will consider Ziopatch if becomes more frequent/concerning or if LV dysfunction is abnormal \par \par # SLE\par - he is on Plaquenil which can cause cardiotoxicity with longterm use, will continue to follow surveillance echocardiography \par - he is also on MMF\par \par # Osteoporosis , likely from prednisone use \par - pending possible hip surgery\par \par # DVT with + lupus anticoagulant\par - he remains on coumadin by his PMD \par \par RTC in 6 months. Will call with result of TTE

## 2021-02-03 NOTE — PHYSICAL EXAM
[General Appearance - Well Developed] : well developed [General Appearance - Well Nourished] : well nourished [Normal Conjunctiva] : the conjunctiva exhibited no abnormalities [FreeTextEntry1] : JVP nonelevated [Heart Rate And Rhythm] : heart rate and rhythm were normal [Heart Sounds] : normal S1 and S2 [Murmurs] : no murmurs present [Respiration, Rhythm And Depth] : normal respiratory rhythm and effort [Auscultation Breath Sounds / Voice Sounds] : lungs were clear to auscultation bilaterally [Bowel Sounds] : normal bowel sounds [Abdomen Soft] : soft [Skin Color & Pigmentation] : normal skin color and pigmentation [Oriented To Time, Place, And Person] : oriented to person, place, and time [Impaired Insight] : insight and judgment were intact

## 2021-02-03 NOTE — ASSESSMENT
[FreeTextEntry1] : 29 YO M with a history of HF with recovered ejection fraction and SLE presenting to HF clinic for for further management. \par \par Given active inflammation on prior biopsy with NSVT and LGE on a cardiac MRI, I suspect his prior clinical HF was related to myocarditis. \par \par Today he reports NYHA I-III symptoms and appears euvolemic on exam.

## 2021-02-03 NOTE — HISTORY OF PRESENT ILLNESS
[FreeTextEntry1] : \par Demetrius Barnes is a 31 YO M with a history of HF with recovered ejection fraction and SLE presenting to HF clinic for for further management. \par \par He was diagnosed with a cardiomyopathy at the age of 25 in 2007. He had low normal LV function with refractory NSVT requiring amiodarone and an endomyocardial biopsy was performed which was inconclusive for lupus myocarditis but did reveal acute inflammation. He underwent a EPS in 2010 where no VT was induced and his amiodarone was discontinued. He has had documented LVEF's as low as 37% but more recently he has had low-normal LV function on medical therapy. \par \par He presents today for followup. He feels well overall. He does note occasional palpitations occurring rarely, every 2 weeks lasting 2-3 seconds at a time. This is not similar to prior NSVT episodes in that it is not as strong/frequent as before. Denies dyspnea on exertion. Notes chronic RLE edema from DVT but otherwise no edema. Denies dizziness/LH. Denies chest pain or chest pressure. He is exercising with light weight lifting and calisthenics and notes dyspnea after heavy exertion.

## 2021-02-09 ENCOUNTER — RX RENEWAL (OUTPATIENT)
Age: 31
End: 2021-02-09

## 2021-02-10 ENCOUNTER — LABORATORY RESULT (OUTPATIENT)
Age: 31
End: 2021-02-10

## 2021-02-10 ENCOUNTER — RX RENEWAL (OUTPATIENT)
Age: 31
End: 2021-02-10

## 2021-02-10 ENCOUNTER — APPOINTMENT (OUTPATIENT)
Dept: RHEUMATOLOGY | Facility: CLINIC | Age: 31
End: 2021-02-10
Payer: MEDICAID

## 2021-02-10 VITALS
TEMPERATURE: 98.2 F | HEART RATE: 83 BPM | WEIGHT: 195 LBS | BODY MASS INDEX: 29.55 KG/M2 | DIASTOLIC BLOOD PRESSURE: 66 MMHG | RESPIRATION RATE: 16 BRPM | HEIGHT: 68 IN | SYSTOLIC BLOOD PRESSURE: 113 MMHG | OXYGEN SATURATION: 97 %

## 2021-02-10 PROCEDURE — 96413 CHEMO IV INFUSION 1 HR: CPT

## 2021-02-10 PROCEDURE — ZZZZZ: CPT

## 2021-02-10 PROCEDURE — 99215 OFFICE O/P EST HI 40 MIN: CPT | Mod: 25

## 2021-02-10 PROCEDURE — 99072 ADDL SUPL MATRL&STAF TM PHE: CPT

## 2021-03-09 ENCOUNTER — OUTPATIENT (OUTPATIENT)
Dept: OUTPATIENT SERVICES | Facility: HOSPITAL | Age: 31
LOS: 1 days | Discharge: HOME | End: 2021-03-09
Payer: MEDICAID

## 2021-03-09 DIAGNOSIS — I80.9 PHLEBITIS AND THROMBOPHLEBITIS OF UNSPECIFIED SITE: ICD-10-CM

## 2021-03-11 ENCOUNTER — LABORATORY RESULT (OUTPATIENT)
Age: 31
End: 2021-03-11

## 2021-03-11 ENCOUNTER — RESULT REVIEW (OUTPATIENT)
Age: 31
End: 2021-03-11

## 2021-03-11 ENCOUNTER — APPOINTMENT (OUTPATIENT)
Dept: RHEUMATOLOGY | Facility: CLINIC | Age: 31
End: 2021-03-11
Payer: MEDICAID

## 2021-03-11 ENCOUNTER — NON-APPOINTMENT (OUTPATIENT)
Age: 31
End: 2021-03-11

## 2021-03-11 ENCOUNTER — APPOINTMENT (OUTPATIENT)
Dept: HEMATOLOGY ONCOLOGY | Facility: CLINIC | Age: 31
End: 2021-03-11
Payer: MEDICAID

## 2021-03-11 VITALS
OXYGEN SATURATION: 98 % | SYSTOLIC BLOOD PRESSURE: 113 MMHG | DIASTOLIC BLOOD PRESSURE: 76 MMHG | RESPIRATION RATE: 17 BRPM | HEART RATE: 79 BPM | WEIGHT: 187.83 LBS | TEMPERATURE: 97.9 F | BODY MASS INDEX: 28.47 KG/M2 | HEIGHT: 67.99 IN

## 2021-03-11 LAB
BASOPHILS # BLD AUTO: 0 K/UL — SIGNIFICANT CHANGE UP (ref 0–0.2)
BASOPHILS NFR BLD AUTO: 0 % — SIGNIFICANT CHANGE UP (ref 0–2)
EOSINOPHIL # BLD AUTO: 0 K/UL — SIGNIFICANT CHANGE UP (ref 0–0.5)
EOSINOPHIL NFR BLD AUTO: 0 % — SIGNIFICANT CHANGE UP (ref 0–6)
HCT VFR BLD CALC: 45.6 % — SIGNIFICANT CHANGE UP (ref 39–50)
HGB BLD-MCNC: 14.6 G/DL — SIGNIFICANT CHANGE UP (ref 13–17)
LYMPHOCYTES # BLD AUTO: 1.44 K/UL — SIGNIFICANT CHANGE UP (ref 1–3.3)
LYMPHOCYTES # BLD AUTO: 23 % — SIGNIFICANT CHANGE UP (ref 13–44)
MCHC RBC-ENTMCNC: 28.6 PG — SIGNIFICANT CHANGE UP (ref 27–34)
MCHC RBC-ENTMCNC: 32 G/DL — SIGNIFICANT CHANGE UP (ref 32–36)
MCV RBC AUTO: 89.2 FL — SIGNIFICANT CHANGE UP (ref 80–100)
MONOCYTES # BLD AUTO: 0.87 K/UL — SIGNIFICANT CHANGE UP (ref 0–0.9)
MONOCYTES NFR BLD AUTO: 14 % — SIGNIFICANT CHANGE UP (ref 2–14)
NEUTROPHILS # BLD AUTO: 3.93 K/UL — SIGNIFICANT CHANGE UP (ref 1.8–7.4)
NEUTROPHILS NFR BLD AUTO: 63 % — SIGNIFICANT CHANGE UP (ref 43–77)
NRBC # BLD: 0 /100 — SIGNIFICANT CHANGE UP (ref 0–0)
NRBC # BLD: SIGNIFICANT CHANGE UP /100 WBCS (ref 0–0)
PLAT MORPH BLD: NORMAL — SIGNIFICANT CHANGE UP
PLATELET # BLD AUTO: 180 K/UL — SIGNIFICANT CHANGE UP (ref 150–400)
RBC # BLD: 5.11 M/UL — SIGNIFICANT CHANGE UP (ref 4.2–5.8)
RBC # FLD: 13.2 % — SIGNIFICANT CHANGE UP (ref 10.3–14.5)
RBC BLD AUTO: SIGNIFICANT CHANGE UP
WBC # BLD: 6.24 K/UL — SIGNIFICANT CHANGE UP (ref 3.8–10.5)
WBC # FLD AUTO: 6.24 K/UL — SIGNIFICANT CHANGE UP (ref 3.8–10.5)

## 2021-03-11 PROCEDURE — 99072 ADDL SUPL MATRL&STAF TM PHE: CPT

## 2021-03-11 PROCEDURE — 96413 CHEMO IV INFUSION 1 HR: CPT

## 2021-03-11 PROCEDURE — 99214 OFFICE O/P EST MOD 30 MIN: CPT

## 2021-03-11 NOTE — ASSESSMENT
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 Recently with difficulty maintaining a therapeutic INR he last couple of months. Recommend increasing Coumadin to 10mg daily x 2 days, then 7.5mg daily. After 3 days on 7.5mg daily follow up with PCP to monitor PT/INR levels and titrate dose based on PT/INR goal.\par Nutrition referral provided to review Coumadin diet.\par CBC stable today. \par His PCP follows his PT/INR\par Continue to follow up with rheumatology, Dr. Mcdonald.  \par \par Follow up as needed.  \par Case and management discussed with Dr. Jett

## 2021-03-11 NOTE — HISTORY OF PRESENT ILLNESS
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 Since his last visit he has not had any clotting events and is referred by his PCP today to have the patient switch anticoagulation given recent subtherapeutic PT/INR on Coumadin, which was increased to 9mg for 2 days then back to 6mg daily after the PT/INR remained subtherapeutic. He states he has been eating more salad/lettuce, has stopped eating spinach x 1 month. Now mainly eating chicken and carrots. He continues close follow up with rheumatology for his lupus, which has been manageable recently. He remains on plaquenil/cellcept and warfarin 6 mg daily. Recently seen by cardiology for heart failure with recovered ejection fraction management. Severe depression and anxiety has improved, seen by psychiatrist. Patient denies lightheadedness, fatigue, palpitations, any bleeding, easy bruising, melena, BRBPR, fever, chills, abdominal pain, or chest pain. Good appetite, stable weight.\par

## 2021-03-11 NOTE — REVIEW OF SYSTEMS
[Joint Pain] : joint pain [Anxiety] : anxiety [Depression] : depression [Negative] : Allergic/Immunologic [Joint Stiffness] : no joint stiffness [Muscle Pain] : no muscle pain [Muscle Weakness] : no muscle weakness [Suicidal] : not suicidal [Insomnia] : no insomnia [FreeTextEntry9] : occasional

## 2021-03-12 ENCOUNTER — NON-APPOINTMENT (OUTPATIENT)
Age: 31
End: 2021-03-12

## 2021-04-09 ENCOUNTER — LABORATORY RESULT (OUTPATIENT)
Age: 31
End: 2021-04-09

## 2021-04-09 ENCOUNTER — APPOINTMENT (OUTPATIENT)
Dept: RHEUMATOLOGY | Facility: CLINIC | Age: 31
End: 2021-04-09

## 2021-04-09 ENCOUNTER — APPOINTMENT (OUTPATIENT)
Dept: RHEUMATOLOGY | Facility: CLINIC | Age: 31
End: 2021-04-09
Payer: MEDICAID

## 2021-04-09 PROCEDURE — 99072 ADDL SUPL MATRL&STAF TM PHE: CPT

## 2021-04-09 PROCEDURE — 36415 COLL VENOUS BLD VENIPUNCTURE: CPT

## 2021-04-09 PROCEDURE — 96413 CHEMO IV INFUSION 1 HR: CPT

## 2021-04-30 ENCOUNTER — APPOINTMENT (OUTPATIENT)
Dept: CARDIOLOGY | Facility: CLINIC | Age: 31
End: 2021-04-30
Payer: MEDICAID

## 2021-04-30 DIAGNOSIS — I47.2 VENTRICULAR TACHYCARDIA: ICD-10-CM

## 2021-04-30 PROCEDURE — 93306 TTE W/DOPPLER COMPLETE: CPT

## 2021-04-30 PROCEDURE — 99072 ADDL SUPL MATRL&STAF TM PHE: CPT

## 2021-05-06 ENCOUNTER — LABORATORY RESULT (OUTPATIENT)
Age: 31
End: 2021-05-06

## 2021-05-06 ENCOUNTER — APPOINTMENT (OUTPATIENT)
Dept: RHEUMATOLOGY | Facility: CLINIC | Age: 31
End: 2021-05-06
Payer: MEDICAID

## 2021-05-06 PROCEDURE — 96413 CHEMO IV INFUSION 1 HR: CPT

## 2021-05-06 PROCEDURE — 36415 COLL VENOUS BLD VENIPUNCTURE: CPT

## 2021-05-06 PROCEDURE — 99072 ADDL SUPL MATRL&STAF TM PHE: CPT

## 2021-05-07 ENCOUNTER — NON-APPOINTMENT (OUTPATIENT)
Age: 31
End: 2021-05-07

## 2021-06-03 ENCOUNTER — LABORATORY RESULT (OUTPATIENT)
Age: 31
End: 2021-06-03

## 2021-06-03 ENCOUNTER — APPOINTMENT (OUTPATIENT)
Dept: RHEUMATOLOGY | Facility: CLINIC | Age: 31
End: 2021-06-03
Payer: MEDICAID

## 2021-06-03 PROCEDURE — 96413 CHEMO IV INFUSION 1 HR: CPT

## 2021-06-03 PROCEDURE — 36415 COLL VENOUS BLD VENIPUNCTURE: CPT

## 2021-06-25 ENCOUNTER — NON-APPOINTMENT (OUTPATIENT)
Age: 31
End: 2021-06-25

## 2021-07-14 ENCOUNTER — APPOINTMENT (OUTPATIENT)
Dept: UROLOGY | Facility: CLINIC | Age: 31
End: 2021-07-14

## 2021-07-20 ENCOUNTER — APPOINTMENT (OUTPATIENT)
Dept: UROLOGY | Facility: CLINIC | Age: 31
End: 2021-07-20
Payer: MEDICAID

## 2021-07-20 DIAGNOSIS — K21.9 GASTRO-ESOPHAGEAL REFLUX DISEASE W/OUT ESOPHAGITIS: ICD-10-CM

## 2021-07-20 PROCEDURE — 99214 OFFICE O/P EST MOD 30 MIN: CPT

## 2021-07-20 RX ORDER — SILDENAFIL 20 MG/1
20 TABLET ORAL
Qty: 30 | Refills: 3 | Status: DISCONTINUED | COMMUNITY
Start: 2018-08-06 | End: 2021-07-20

## 2021-07-20 NOTE — HISTORY OF PRESENT ILLNESS
[FreeTextEntry1] : Very pleasant 31-year-old gentleman who presents for follow-up of erectile dysfunction. He reports that sildenafil continues to improve his erections, however has somewhat lost its efficacy recently.  He reports using up to 100 mg of sildenafil at a time. He reports no side effects from the medication outside of an occasional headache.  This is not significantly bothersome.  He reports no priapism.  No other complaints.\par

## 2021-07-20 NOTE — ASSESSMENT
[FreeTextEntry1] : Very pleasant 31-year-old gentleman who presents for follow-up of erectile dysfunction\par -Stop sildenafil given decreased efficacy at this time\par -We had an extensive discussion regarding possible management options for erectile dysfunction, including PDE 5 inhibitors, CHRISTIAN, ICI, intraurethral alprostadil, penile prosthesis\par -After a thorough discussion of the risks and benefits of the aforementioned options he would like to try a trial of a different PDE 5 inhibitor\par -Trial of Cialis 20 mg\par -I discussed the risks, benefits, alternatives, and possible side effects of Cialis (tadalafil) therapy with the patient, including but not limited to headache, flushing, upset stomach, blurry vision, change in color vision, vision loss, and priapism with the patient.\par -Follow-up in 1 month

## 2021-07-21 ENCOUNTER — LABORATORY RESULT (OUTPATIENT)
Age: 31
End: 2021-07-21

## 2021-07-21 ENCOUNTER — APPOINTMENT (OUTPATIENT)
Dept: RHEUMATOLOGY | Facility: CLINIC | Age: 31
End: 2021-07-21
Payer: MEDICAID

## 2021-07-21 PROCEDURE — 36415 COLL VENOUS BLD VENIPUNCTURE: CPT

## 2021-07-21 PROCEDURE — 96413 CHEMO IV INFUSION 1 HR: CPT

## 2021-08-16 ENCOUNTER — APPOINTMENT (OUTPATIENT)
Dept: RHEUMATOLOGY | Facility: CLINIC | Age: 31
End: 2021-08-16
Payer: MEDICAID

## 2021-08-16 VITALS
DIASTOLIC BLOOD PRESSURE: 67 MMHG | TEMPERATURE: 97.3 F | HEIGHT: 67.99 IN | SYSTOLIC BLOOD PRESSURE: 101 MMHG | HEART RATE: 65 BPM | RESPIRATION RATE: 17 BRPM | BODY MASS INDEX: 27.28 KG/M2 | WEIGHT: 180 LBS

## 2021-08-16 PROCEDURE — 99215 OFFICE O/P EST HI 40 MIN: CPT

## 2021-08-18 ENCOUNTER — LABORATORY RESULT (OUTPATIENT)
Age: 31
End: 2021-08-18

## 2021-08-18 ENCOUNTER — APPOINTMENT (OUTPATIENT)
Dept: RHEUMATOLOGY | Facility: CLINIC | Age: 31
End: 2021-08-18
Payer: MEDICAID

## 2021-08-18 PROCEDURE — 96413 CHEMO IV INFUSION 1 HR: CPT

## 2021-08-18 PROCEDURE — 36415 COLL VENOUS BLD VENIPUNCTURE: CPT

## 2021-08-20 ENCOUNTER — APPOINTMENT (OUTPATIENT)
Dept: UROLOGY | Facility: CLINIC | Age: 31
End: 2021-08-20

## 2021-08-25 ENCOUNTER — RX RENEWAL (OUTPATIENT)
Age: 31
End: 2021-08-25

## 2021-09-20 ENCOUNTER — LABORATORY RESULT (OUTPATIENT)
Age: 31
End: 2021-09-20

## 2021-09-20 ENCOUNTER — APPOINTMENT (OUTPATIENT)
Dept: RHEUMATOLOGY | Facility: CLINIC | Age: 31
End: 2021-09-20
Payer: MEDICAID

## 2021-09-20 PROCEDURE — 96413 CHEMO IV INFUSION 1 HR: CPT

## 2021-09-20 PROCEDURE — 36415 COLL VENOUS BLD VENIPUNCTURE: CPT

## 2021-09-22 ENCOUNTER — APPOINTMENT (OUTPATIENT)
Dept: HEART FAILURE | Facility: CLINIC | Age: 31
End: 2021-09-22
Payer: MEDICAID

## 2021-09-22 VITALS
HEIGHT: 67 IN | DIASTOLIC BLOOD PRESSURE: 71 MMHG | BODY MASS INDEX: 30.92 KG/M2 | OXYGEN SATURATION: 100 % | TEMPERATURE: 98.6 F | SYSTOLIC BLOOD PRESSURE: 117 MMHG | RESPIRATION RATE: 16 BRPM | HEART RATE: 67 BPM | WEIGHT: 197 LBS

## 2021-09-22 PROCEDURE — 99214 OFFICE O/P EST MOD 30 MIN: CPT

## 2021-09-22 RX ORDER — ESCITALOPRAM OXALATE 10 MG/1
10 TABLET, FILM COATED ORAL
Refills: 0 | Status: ACTIVE | COMMUNITY
Start: 2021-09-22

## 2021-09-22 RX ORDER — WARFARIN 2.5 MG/1
2.5 TABLET ORAL DAILY
Qty: 2 | Refills: 0 | Status: DISCONTINUED | COMMUNITY
Start: 2021-03-11 | End: 2021-09-22

## 2021-09-22 NOTE — DISCUSSION/SUMMARY
[FreeTextEntry1] : # HF with recovered ejection fraction\par - his neurohormonal regimen at this time is metoprolol succinate 200 mg daily and lisinopril 10 mg daily. will continue at this time\par - will continue annual TTE at this time, last 4/2021 \par \par # Palpitations\par - will consider Ziopatch if becomes more frequent/concerning however are only occurring every 2 months now and last few minutes at a time \par \par # SLE\par - he is on Plaquenil which can cause cardiotoxicity with longterm use, will continue to follow surveillance echocardiography \par - he is also on MMF\par \par # Osteoporosis , likely from prednisone use \par - pending possible hip surgery\par \par # DVT with + lupus anticoagulant\par - he remains on coumadin by his PMD \par \par RTC in 1 year with me at [facility]. He will call for appointment closer to this timeframe.

## 2021-09-22 NOTE — ASSESSMENT
[FreeTextEntry1] : 32 YO M with a history of HF with recovered ejection fraction and SLE presenting to HF clinic for for further management. \par \par Given active inflammation on prior biopsy with NSVT and LGE on a cardiac MRI, I suspect his prior clinical HF was related to myocarditis. \par \par Today he reports NYHA I symptoms and appears euvolemic on exam.

## 2021-09-22 NOTE — PHYSICAL EXAM
[General Appearance - Well Developed] : well developed [General Appearance - Well Nourished] : well nourished [Normal Conjunctiva] : the conjunctiva exhibited no abnormalities [Heart Rate And Rhythm] : heart rate and rhythm were normal [Heart Sounds] : normal S1 and S2 [Murmurs] : no murmurs present [Respiration, Rhythm And Depth] : normal respiratory rhythm and effort [Auscultation Breath Sounds / Voice Sounds] : lungs were clear to auscultation bilaterally [Bowel Sounds] : normal bowel sounds [Abdomen Soft] : soft [Skin Color & Pigmentation] : normal skin color and pigmentation [Oriented To Time, Place, And Person] : oriented to person, place, and time [Impaired Insight] : insight and judgment were intact [FreeTextEntry1] : JVP nonelevated

## 2021-09-22 NOTE — HISTORY OF PRESENT ILLNESS
[FreeTextEntry1] : \par Demetrius Barnes is a 32 YO M with a history of HF with recovered ejection fraction and SLE presenting to HF clinic for for further management. \par \par He was diagnosed with a cardiomyopathy at the age of 25 in 2007. He had low normal LV function with refractory NSVT requiring amiodarone and an endomyocardial biopsy was performed which was inconclusive for lupus myocarditis but did reveal acute inflammation. He underwent a EPS in 2010 where no VT was induced and his amiodarone was discontinued. He has had documented LVEF's as low as 37% but more recently he has had low-normal LV function on medical therapy. \par \par He presents today for followup. He feels well overall. He continues to note palpitations which are rare (every 2 months) and last 2-5 minutes at a time. Denies syncope. Denies dyspnea on exertion or lower extremity edema. Denies dizziness or lightheadedness. Denies chest pain or chest pressure. He has not been as active recently which he attributes to laziness.

## 2021-10-11 ENCOUNTER — EMERGENCY (EMERGENCY)
Facility: HOSPITAL | Age: 31
LOS: 1 days | Discharge: HOME | End: 2021-10-11
Attending: EMERGENCY MEDICINE
Payer: MEDICAID

## 2021-10-11 VITALS
RESPIRATION RATE: 18 BRPM | OXYGEN SATURATION: 99 % | SYSTOLIC BLOOD PRESSURE: 102 MMHG | TEMPERATURE: 98 F | DIASTOLIC BLOOD PRESSURE: 65 MMHG | HEART RATE: 65 BPM

## 2021-10-11 VITALS
WEIGHT: 190.04 LBS | RESPIRATION RATE: 20 BRPM | DIASTOLIC BLOOD PRESSURE: 66 MMHG | OXYGEN SATURATION: 98 % | SYSTOLIC BLOOD PRESSURE: 96 MMHG | TEMPERATURE: 98 F | HEIGHT: 68 IN | HEART RATE: 73 BPM

## 2021-10-11 LAB
APTT BLD: 47.8 SEC — HIGH (ref 27.5–35.5)
INR BLD: 2.4 RATIO — HIGH (ref 0.88–1.16)
PROTHROM AB SERPL-ACNC: 27.6 SEC — HIGH (ref 10.6–13.6)

## 2021-10-11 PROCEDURE — 85730 THROMBOPLASTIN TIME PARTIAL: CPT

## 2021-10-11 PROCEDURE — 99284 EMERGENCY DEPT VISIT MOD MDM: CPT

## 2021-10-11 PROCEDURE — 85610 PROTHROMBIN TIME: CPT

## 2021-10-11 PROCEDURE — 93971 EXTREMITY STUDY: CPT

## 2021-10-11 PROCEDURE — 99284 EMERGENCY DEPT VISIT MOD MDM: CPT | Mod: 25

## 2021-10-11 PROCEDURE — 93971 EXTREMITY STUDY: CPT | Mod: 26,RT

## 2021-10-11 NOTE — ED PROVIDER NOTE - PHYSICAL EXAMINATION
CONSTITUTIONAL: Well appearing and in no apparent distress.  ENT: Airway patent, moist mucous membranes.   EYES: Pupils equal, round and reactive to light. EOMI. Conjunctiva normal appearing.   CARDIAC: Normal rate, regular rhythm.  Heart sounds S1, S2.  No murmurs, rubs or gallops.  RESPIRATORY: Breath sounds clear and equal bilaterally. No wheezing, rales or rhonchi.   GASTROINTESTINAL: Abdomen soft, non-tender, not distended and no guarding. No CVAT bilaterally.  MUSCULOSKELETAL: Spine appears normal, no muscle or joint tenderness. No LE edema. No calf TTP. Legs symmetric appearing.  +Bruise to RLE over calf with mild TTP and hardened vessel to palpation possibly ?SVT/thrombophlebitis  NEUROLOGICAL: Alert and oriented x3, no focal deficits, no motor or sensory deficits. 5/5 muscle strength throughout.  SKIN: Skin normal color, warm, dry and intact. No evidence of rash.  PSYCHIATRIC: Normal mood and affect.
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
## 2021-10-11 NOTE — ED PROVIDER NOTE - NSICDXPASTMEDICALHX_GEN_ALL_CORE_FT
PAST MEDICAL HISTORY:  DVT (deep venous thrombosis)     Lupus     Myocarditis     Systemic lupus

## 2021-10-11 NOTE — ED PROVIDER NOTE - CLINICAL SUMMARY MEDICAL DECISION MAKING FREE TEXT BOX
RLE bruise/tenderness r/o DVT  Ddx SVT/thrombophlebitis    Labs  Venous duplex  Reassess RLE bruise/tenderness r/o DVT  Ddx SVT/thrombophlebitis    Labs  Venous duplex  Reassess    MARQUITA Kaye MD: Agree with resident MDM, assessment and plan as above. Check INR and LE duplex.

## 2021-10-11 NOTE — ED PROVIDER NOTE - NSICDXFAMILYHX_GEN_ALL_CORE_FT
FAMILY HISTORY:  Mother  Still living? Unknown  Family history of systemic lupus erythematosus (SLE) in mother, Age at diagnosis: Age Unknown

## 2021-10-11 NOTE — ED PROVIDER NOTE - PATIENT PORTAL LINK FT
You can access the FollowMyHealth Patient Portal offered by [facility] by registering at the following website: [email or web address]. By joining [employer]’s FollowMyHealth portal, you will also be able to view your health information using other applications (apps) compatible with our system.

## 2021-10-11 NOTE — ED ADULT NURSE NOTE - CHIEF COMPLAINT QUOTE
Bruise on leg, history of a blood clot, on coumadin.
GENERAL: NAD  HEAD:  Atraumatic, Normocephalic  HEENT: scleral anicteric.   CV: Chest pain worsens with palpation. Regular rate and rhythm. No murmurs, rubs, or gallops.   Respiratory: normal respiratory effort, speaking in complete sentences. Lungs clear to auscultation bilaterally, no wheezes/crackles.  ABDOMEN: Soft, Nontender, Nondistended; Bowel sounds normal  EXTREMITIES: No lower extremity edema. Bilateral LE symmetric in size.   PSYCH: AAOx3.   NEURO: Symmetric facial expressions.   Skin: No rashes

## 2021-10-11 NOTE — ED PROVIDER NOTE - NSFOLLOWUPINSTRUCTIONS_ED_ALL_ED_FT
You were seen and evaluated in the ED for right leg bruising and tenderness. Your duplex of your right leg was ***  Your INR was 2.4  Continue all medications as prescribed  Please make sure to follow up with the specialist and your primary care doctor in 1-2 days. Bring a copy of your results with you that were given to you with your discharge paperwork.   Take Tylenol for pain. AVOID Ibuprofen. You were seen and evaluated in the ED for right leg bruising and tenderness. Your duplex of your right leg was negative for a deep vein thrombosis.   Your INR was 2.4  Continue all medications as prescribed  Please make sure to follow up with the specialist and your primary care doctor in 1-2 days. Bring a copy of your results with you that were given to you with your discharge paperwork.   Take Tylenol for pain. AVOID Ibuprofen.

## 2021-10-18 ENCOUNTER — APPOINTMENT (OUTPATIENT)
Dept: RHEUMATOLOGY | Facility: CLINIC | Age: 31
End: 2021-10-18
Payer: MEDICAID

## 2021-10-18 ENCOUNTER — LABORATORY RESULT (OUTPATIENT)
Age: 31
End: 2021-10-18

## 2021-10-18 PROCEDURE — 96413 CHEMO IV INFUSION 1 HR: CPT

## 2021-10-18 PROCEDURE — 36415 COLL VENOUS BLD VENIPUNCTURE: CPT

## 2021-10-24 ENCOUNTER — RX RENEWAL (OUTPATIENT)
Age: 31
End: 2021-10-24

## 2021-11-02 ENCOUNTER — TRANSCRIPTION ENCOUNTER (OUTPATIENT)
Age: 31
End: 2021-11-02

## 2021-11-15 ENCOUNTER — APPOINTMENT (OUTPATIENT)
Dept: RHEUMATOLOGY | Facility: CLINIC | Age: 31
End: 2021-11-15

## 2021-12-01 ENCOUNTER — APPOINTMENT (OUTPATIENT)
Dept: RHEUMATOLOGY | Facility: CLINIC | Age: 31
End: 2021-12-01
Payer: MEDICAID

## 2021-12-01 ENCOUNTER — LABORATORY RESULT (OUTPATIENT)
Age: 31
End: 2021-12-01

## 2021-12-01 PROCEDURE — 36415 COLL VENOUS BLD VENIPUNCTURE: CPT

## 2021-12-01 PROCEDURE — 96413 CHEMO IV INFUSION 1 HR: CPT

## 2021-12-13 ENCOUNTER — APPOINTMENT (OUTPATIENT)
Dept: RHEUMATOLOGY | Facility: CLINIC | Age: 31
End: 2021-12-13

## 2021-12-29 ENCOUNTER — LABORATORY RESULT (OUTPATIENT)
Age: 31
End: 2021-12-29

## 2021-12-29 ENCOUNTER — APPOINTMENT (OUTPATIENT)
Dept: RHEUMATOLOGY | Facility: CLINIC | Age: 31
End: 2021-12-29
Payer: MEDICAID

## 2021-12-29 PROCEDURE — 36415 COLL VENOUS BLD VENIPUNCTURE: CPT

## 2021-12-29 PROCEDURE — 96413 CHEMO IV INFUSION 1 HR: CPT

## 2022-01-26 ENCOUNTER — LABORATORY RESULT (OUTPATIENT)
Age: 32
End: 2022-01-26

## 2022-01-26 ENCOUNTER — APPOINTMENT (OUTPATIENT)
Dept: RHEUMATOLOGY | Facility: CLINIC | Age: 32
End: 2022-01-26
Payer: MEDICAID

## 2022-01-26 PROCEDURE — 96413 CHEMO IV INFUSION 1 HR: CPT

## 2022-01-26 PROCEDURE — 36415 COLL VENOUS BLD VENIPUNCTURE: CPT

## 2022-01-27 ENCOUNTER — RX RENEWAL (OUTPATIENT)
Age: 32
End: 2022-01-27

## 2022-02-14 PROCEDURE — 99214 OFFICE O/P EST MOD 30 MIN: CPT | Mod: 95

## 2022-02-23 ENCOUNTER — LABORATORY RESULT (OUTPATIENT)
Age: 32
End: 2022-02-23

## 2022-02-23 ENCOUNTER — APPOINTMENT (OUTPATIENT)
Dept: RHEUMATOLOGY | Facility: CLINIC | Age: 32
End: 2022-02-23
Payer: MEDICAID

## 2022-02-23 PROCEDURE — 36415 COLL VENOUS BLD VENIPUNCTURE: CPT

## 2022-02-23 PROCEDURE — 96413 CHEMO IV INFUSION 1 HR: CPT

## 2022-02-28 ENCOUNTER — TRANSCRIPTION ENCOUNTER (OUTPATIENT)
Age: 32
End: 2022-02-28

## 2022-03-01 ENCOUNTER — TRANSCRIPTION ENCOUNTER (OUTPATIENT)
Age: 32
End: 2022-03-01

## 2022-03-02 ENCOUNTER — TRANSCRIPTION ENCOUNTER (OUTPATIENT)
Age: 32
End: 2022-03-02

## 2022-03-09 ENCOUNTER — APPOINTMENT (OUTPATIENT)
Dept: UROLOGY | Facility: CLINIC | Age: 32
End: 2022-03-09
Payer: MEDICAID

## 2022-03-09 PROCEDURE — 99213 OFFICE O/P EST LOW 20 MIN: CPT | Mod: 95

## 2022-03-09 NOTE — ASSESSMENT
[FreeTextEntry1] : Very pleasant 31-year-old gentleman who presents for follow-up of erectile dysfunction\par -Continue Cialis 20 mg–refill sent to the pharmacy\par -Follow-up in 6 months or sooner if necessary

## 2022-03-09 NOTE — HISTORY OF PRESENT ILLNESS
[Home] : at home, [unfilled] , at the time of the visit. [Medical Office: ([facility])___] : at the medical office located in  [FreeTextEntry1] : The patient-doctor relationship has been established in a face to face fashion via real time video/audio HIPAA compliant communication using telemedicine software.  He has requested care to be assessed and treated through telemedicine. He understands that there maybe limitations in this process and that he may need further follow up care in the office and/or hospital setting.\par \par Very pleasant 31-year-old gentleman who presents for follow-up of erectile dysfunction.  He feels well.  He reports that Cialis 20 mg significantly improves his erections.  He is happy with his erections on his medication.  He reports no side effects from the medication.  No other complaints.

## 2022-03-22 ENCOUNTER — LABORATORY RESULT (OUTPATIENT)
Age: 32
End: 2022-03-22

## 2022-03-23 ENCOUNTER — APPOINTMENT (OUTPATIENT)
Dept: RHEUMATOLOGY | Facility: CLINIC | Age: 32
End: 2022-03-23
Payer: MEDICAID

## 2022-03-23 PROCEDURE — 36415 COLL VENOUS BLD VENIPUNCTURE: CPT

## 2022-03-23 PROCEDURE — 96413 CHEMO IV INFUSION 1 HR: CPT

## 2022-04-04 ENCOUNTER — APPOINTMENT (OUTPATIENT)
Dept: RHEUMATOLOGY | Facility: CLINIC | Age: 32
End: 2022-04-04
Payer: MEDICAID

## 2022-04-04 VITALS
BODY MASS INDEX: 31.71 KG/M2 | SYSTOLIC BLOOD PRESSURE: 125 MMHG | HEIGHT: 67 IN | HEART RATE: 111 BPM | WEIGHT: 202 LBS | TEMPERATURE: 97.9 F | DIASTOLIC BLOOD PRESSURE: 85 MMHG | OXYGEN SATURATION: 97 % | RESPIRATION RATE: 16 BRPM

## 2022-04-04 PROCEDURE — 99214 OFFICE O/P EST MOD 30 MIN: CPT

## 2022-04-13 ENCOUNTER — TRANSCRIPTION ENCOUNTER (OUTPATIENT)
Age: 32
End: 2022-04-13

## 2022-04-20 ENCOUNTER — APPOINTMENT (OUTPATIENT)
Dept: RHEUMATOLOGY | Facility: CLINIC | Age: 32
End: 2022-04-20

## 2022-04-25 ENCOUNTER — APPOINTMENT (OUTPATIENT)
Dept: RHEUMATOLOGY | Facility: CLINIC | Age: 32
End: 2022-04-25

## 2022-04-28 ENCOUNTER — LABORATORY RESULT (OUTPATIENT)
Age: 32
End: 2022-04-28

## 2022-04-28 ENCOUNTER — APPOINTMENT (OUTPATIENT)
Dept: RHEUMATOLOGY | Facility: CLINIC | Age: 32
End: 2022-04-28
Payer: MEDICAID

## 2022-04-28 PROCEDURE — 96413 CHEMO IV INFUSION 1 HR: CPT

## 2022-04-28 PROCEDURE — 36415 COLL VENOUS BLD VENIPUNCTURE: CPT

## 2022-05-25 ENCOUNTER — LABORATORY RESULT (OUTPATIENT)
Age: 32
End: 2022-05-25

## 2022-05-25 ENCOUNTER — APPOINTMENT (OUTPATIENT)
Dept: RHEUMATOLOGY | Facility: CLINIC | Age: 32
End: 2022-05-25
Payer: MEDICAID

## 2022-05-25 ENCOUNTER — APPOINTMENT (OUTPATIENT)
Dept: RHEUMATOLOGY | Facility: CLINIC | Age: 32
End: 2022-05-25

## 2022-05-25 PROCEDURE — 36415 COLL VENOUS BLD VENIPUNCTURE: CPT

## 2022-05-25 PROCEDURE — 96413 CHEMO IV INFUSION 1 HR: CPT

## 2022-06-23 ENCOUNTER — LABORATORY RESULT (OUTPATIENT)
Age: 32
End: 2022-06-23

## 2022-06-23 ENCOUNTER — APPOINTMENT (OUTPATIENT)
Dept: RHEUMATOLOGY | Facility: CLINIC | Age: 32
End: 2022-06-23
Payer: MEDICAID

## 2022-06-23 VITALS
OXYGEN SATURATION: 98 % | SYSTOLIC BLOOD PRESSURE: 117 MMHG | RESPIRATION RATE: 16 BRPM | BODY MASS INDEX: 32.11 KG/M2 | HEART RATE: 87 BPM | WEIGHT: 205 LBS | DIASTOLIC BLOOD PRESSURE: 74 MMHG

## 2022-06-23 VITALS
SYSTOLIC BLOOD PRESSURE: 120 MMHG | HEART RATE: 92 BPM | OXYGEN SATURATION: 97 % | RESPIRATION RATE: 16 BRPM | TEMPERATURE: 97.8 F | DIASTOLIC BLOOD PRESSURE: 74 MMHG

## 2022-06-23 PROCEDURE — 96413 CHEMO IV INFUSION 1 HR: CPT

## 2022-06-23 PROCEDURE — 36415 COLL VENOUS BLD VENIPUNCTURE: CPT

## 2022-08-02 ENCOUNTER — APPOINTMENT (OUTPATIENT)
Dept: RHEUMATOLOGY | Facility: CLINIC | Age: 32
End: 2022-08-02

## 2022-08-02 VITALS
HEIGHT: 67 IN | DIASTOLIC BLOOD PRESSURE: 81 MMHG | HEART RATE: 76 BPM | BODY MASS INDEX: 31.39 KG/M2 | WEIGHT: 200 LBS | SYSTOLIC BLOOD PRESSURE: 138 MMHG | OXYGEN SATURATION: 97 % | TEMPERATURE: 97.6 F

## 2022-08-02 PROCEDURE — 99214 OFFICE O/P EST MOD 30 MIN: CPT

## 2022-08-05 ENCOUNTER — APPOINTMENT (OUTPATIENT)
Dept: RHEUMATOLOGY | Facility: CLINIC | Age: 32
End: 2022-08-05

## 2022-08-05 VITALS
SYSTOLIC BLOOD PRESSURE: 105 MMHG | RESPIRATION RATE: 16 BRPM | DIASTOLIC BLOOD PRESSURE: 69 MMHG | TEMPERATURE: 98.5 F | OXYGEN SATURATION: 95 % | HEART RATE: 81 BPM

## 2022-08-05 VITALS
DIASTOLIC BLOOD PRESSURE: 70 MMHG | RESPIRATION RATE: 16 BRPM | SYSTOLIC BLOOD PRESSURE: 107 MMHG | OXYGEN SATURATION: 95 % | HEART RATE: 78 BPM

## 2022-08-05 PROCEDURE — 36415 COLL VENOUS BLD VENIPUNCTURE: CPT

## 2022-08-05 PROCEDURE — 96413 CHEMO IV INFUSION 1 HR: CPT

## 2022-08-05 RX ORDER — BELIMUMAB 400 MG/5ML
400 INJECTION, POWDER, LYOPHILIZED, FOR SOLUTION INTRAVENOUS
Qty: 1 | Refills: 0 | Status: COMPLETED | OUTPATIENT
Start: 2022-07-15

## 2022-08-06 LAB
25(OH)D3 SERPL-MCNC: 36.7 NG/ML
ALBUMIN SERPL ELPH-MCNC: 3.9 G/DL
ALP BLD-CCNC: 82 U/L
ALT SERPL-CCNC: 18 U/L
ANION GAP SERPL CALC-SCNC: 23 MMOL/L
AST SERPL-CCNC: 38 U/L
BASOPHILS # BLD AUTO: 0.02 K/UL
BASOPHILS NFR BLD AUTO: 0.3 %
BILIRUB SERPL-MCNC: 0.6 MG/DL
BUN SERPL-MCNC: 13 MG/DL
C3 SERPL-MCNC: 82 MG/DL
C4 SERPL-MCNC: 15 MG/DL
CALCIUM SERPL-MCNC: 9.2 MG/DL
CHLORIDE SERPL-SCNC: 100 MMOL/L
CHOLEST SERPL-MCNC: 163 MG/DL
CK SERPL-CCNC: 446 U/L
CO2 SERPL-SCNC: 19 MMOL/L
CREAT SERPL-MCNC: 1.02 MG/DL
CREAT SPEC-SCNC: 181 MG/DL
CREAT/PROT UR: 0.1 RATIO
DSDNA AB SER-ACNC: 147 IU/ML
EGFR: 101 ML/MIN/1.73M2
EOSINOPHIL # BLD AUTO: 0.1 K/UL
EOSINOPHIL NFR BLD AUTO: 1.5 %
HCT VFR BLD CALC: 48.1 %
HDLC SERPL-MCNC: 38 MG/DL
HGB BLD-MCNC: 14.8 G/DL
IMM GRANULOCYTES NFR BLD AUTO: 0.3 %
LDLC SERPL CALC-MCNC: 89 MG/DL
LYMPHOCYTES # BLD AUTO: 1.65 K/UL
LYMPHOCYTES NFR BLD AUTO: 25.2 %
MAN DIFF?: NORMAL
MCHC RBC-ENTMCNC: 29.2 PG
MCHC RBC-ENTMCNC: 30.8 GM/DL
MCV RBC AUTO: 95.1 FL
MONOCYTES # BLD AUTO: 0.52 K/UL
MONOCYTES NFR BLD AUTO: 7.9 %
NEUTROPHILS # BLD AUTO: 4.25 K/UL
NEUTROPHILS NFR BLD AUTO: 64.8 %
NONHDLC SERPL-MCNC: 125 MG/DL
PLATELET # BLD AUTO: 201 K/UL
POTASSIUM SERPL-SCNC: 4.6 MMOL/L
PROT SERPL-MCNC: 6.7 G/DL
PROT UR-MCNC: 25 MG/DL
RBC # BLD: 5.06 M/UL
RBC # FLD: 14.4 %
SODIUM SERPL-SCNC: 142 MMOL/L
TRIGL SERPL-MCNC: 182 MG/DL
TSH SERPL-ACNC: 2.25 UIU/ML
WBC # FLD AUTO: 6.56 K/UL

## 2022-08-08 LAB
APPEARANCE: CLEAR
BACTERIA: NEGATIVE
BILIRUBIN URINE: NEGATIVE
BLOOD URINE: NEGATIVE
COLOR: NORMAL
GLUCOSE QUALITATIVE U: NEGATIVE
HYALINE CASTS: 0 /LPF
KETONES URINE: NEGATIVE
LEUKOCYTE ESTERASE URINE: NEGATIVE
MICROSCOPIC-UA: NORMAL
NITRITE URINE: NEGATIVE
PH URINE: 5.5
PROTEIN URINE: NEGATIVE
RED BLOOD CELLS URINE: 1 /HPF
SPECIFIC GRAVITY URINE: >=1.03
SQUAMOUS EPITHELIAL CELLS: 2 /HPF
UROBILINOGEN URINE: NORMAL
WHITE BLOOD CELLS URINE: 1 /HPF

## 2022-08-24 ENCOUNTER — RX RENEWAL (OUTPATIENT)
Age: 32
End: 2022-08-24

## 2022-08-30 ENCOUNTER — LABORATORY RESULT (OUTPATIENT)
Age: 32
End: 2022-08-30

## 2022-08-30 ENCOUNTER — APPOINTMENT (OUTPATIENT)
Dept: RHEUMATOLOGY | Facility: CLINIC | Age: 32
End: 2022-08-30

## 2022-08-30 VITALS
HEART RATE: 84 BPM | DIASTOLIC BLOOD PRESSURE: 69 MMHG | RESPIRATION RATE: 16 BRPM | OXYGEN SATURATION: 96 % | SYSTOLIC BLOOD PRESSURE: 105 MMHG

## 2022-08-30 VITALS
OXYGEN SATURATION: 95 % | DIASTOLIC BLOOD PRESSURE: 69 MMHG | SYSTOLIC BLOOD PRESSURE: 103 MMHG | TEMPERATURE: 97.9 F | RESPIRATION RATE: 16 BRPM | HEART RATE: 96 BPM

## 2022-08-30 PROCEDURE — 36415 COLL VENOUS BLD VENIPUNCTURE: CPT

## 2022-08-30 PROCEDURE — 96413 CHEMO IV INFUSION 1 HR: CPT

## 2022-08-30 RX ORDER — BELIMUMAB 400 MG/5ML
400 INJECTION, POWDER, LYOPHILIZED, FOR SOLUTION INTRAVENOUS
Qty: 1 | Refills: 0 | Status: COMPLETED | OUTPATIENT
Start: 2022-08-12

## 2022-08-31 ENCOUNTER — TRANSCRIPTION ENCOUNTER (OUTPATIENT)
Age: 32
End: 2022-08-31

## 2022-09-07 ENCOUNTER — APPOINTMENT (OUTPATIENT)
Dept: UROLOGY | Facility: CLINIC | Age: 32
End: 2022-09-07

## 2022-09-07 DIAGNOSIS — F32.A DEPRESSION, UNSPECIFIED: ICD-10-CM

## 2022-09-07 PROCEDURE — 99213 OFFICE O/P EST LOW 20 MIN: CPT | Mod: 95

## 2022-09-07 NOTE — HISTORY OF PRESENT ILLNESS
[FreeTextEntry1] : The patient-doctor relationship has been established in a face to face fashion via real time video/audio HIPAA compliant communication using telemedicine software.  He has requested care to be assessed and treated through telemedicine. He understands that there maybe limitations in this process and that he may need further follow up care in the office and/or hospital setting.\par \par Very pleasant 31-year-old gentleman who presents for follow-up of erectile dysfunction.  He feels well.  He reports that Cialis 20 mg significantly improves his erections.  He is happy with his erections on his medication.  He reports no side effects from the medication.  No other complaints.\par \par He reports that his anxiety and depression have significantly improved.  He reports a correlation with his erections.  At times he does not need to take Cialis at all.

## 2022-09-07 NOTE — ASSESSMENT
[FreeTextEntry1] : Very pleasant 32-year-old gentleman who presents for follow-up of erectile dysfunction\par -Continue Cialis 20 mg as needed for erectile dysfunction.  Refill sent to the pharmacy\par -We discussed that he should use a maximum of the 1 pill/day.  We discussed the risk of increased side effects using more than 20 mg\par -Follow-up in 6 months

## 2022-09-13 ENCOUNTER — RX RENEWAL (OUTPATIENT)
Age: 32
End: 2022-09-13

## 2022-09-20 ENCOUNTER — TRANSCRIPTION ENCOUNTER (OUTPATIENT)
Age: 32
End: 2022-09-20

## 2022-09-27 ENCOUNTER — LABORATORY RESULT (OUTPATIENT)
Age: 32
End: 2022-09-27

## 2022-09-27 ENCOUNTER — APPOINTMENT (OUTPATIENT)
Dept: RHEUMATOLOGY | Facility: CLINIC | Age: 32
End: 2022-09-27

## 2022-09-27 VITALS
HEART RATE: 72 BPM | SYSTOLIC BLOOD PRESSURE: 110 MMHG | TEMPERATURE: 97.4 F | OXYGEN SATURATION: 96 % | DIASTOLIC BLOOD PRESSURE: 72 MMHG | RESPIRATION RATE: 16 BRPM

## 2022-09-27 VITALS
RESPIRATION RATE: 16 BRPM | HEART RATE: 80 BPM | SYSTOLIC BLOOD PRESSURE: 110 MMHG | DIASTOLIC BLOOD PRESSURE: 73 MMHG | OXYGEN SATURATION: 98 %

## 2022-09-27 PROCEDURE — 96413 CHEMO IV INFUSION 1 HR: CPT

## 2022-09-27 PROCEDURE — 36415 COLL VENOUS BLD VENIPUNCTURE: CPT

## 2022-09-27 RX ORDER — BELIMUMAB 400 MG/5ML
400 INJECTION, POWDER, LYOPHILIZED, FOR SOLUTION INTRAVENOUS
Qty: 1 | Refills: 0 | Status: COMPLETED | OUTPATIENT
Start: 2022-09-09

## 2022-10-25 ENCOUNTER — APPOINTMENT (OUTPATIENT)
Dept: HEART AND VASCULAR | Facility: CLINIC | Age: 32
End: 2022-10-25

## 2022-10-25 ENCOUNTER — NON-APPOINTMENT (OUTPATIENT)
Age: 32
End: 2022-10-25

## 2022-10-25 VITALS
TEMPERATURE: 98.8 F | HEART RATE: 86 BPM | HEIGHT: 67 IN | SYSTOLIC BLOOD PRESSURE: 118 MMHG | WEIGHT: 213.25 LBS | OXYGEN SATURATION: 97 % | BODY MASS INDEX: 33.47 KG/M2 | DIASTOLIC BLOOD PRESSURE: 80 MMHG

## 2022-10-25 PROCEDURE — 99214 OFFICE O/P EST MOD 30 MIN: CPT

## 2022-10-25 NOTE — CARDIOLOGY SUMMARY
[de-identified] : \par TTE 4/30/21: LV 5.0 cm, LVEF 50-55%, no significant valvular abnormalities, normal biatrial size, normal RV size/function, highly mobile interatrial septum, IVC small/collapsing. \par \par TTE 6/2018: LV 5.3 cm, LVEF 50-55%, normal RV size/function \par  [de-identified] : \par Cardiac MRI 2/2015: LVEDV 195 ml ,LVEF 43%, LGE in inferior wall of LV\par

## 2022-10-25 NOTE — DISCUSSION/SUMMARY
[FreeTextEntry1] : # HF with recovered ejection fraction\par - his neurohormonal regimen at this time is metoprolol succinate 200 mg daily and lisinopril 10 mg daily. will continue at this time\par - will continue with annual TTE, last 4/2021. will have him go for TTE in next 1-2 months at [facility]\par - if LVEF borderline will transition lisinopril to Entresto \par - Labs 9/30 with K 3.9 and Cr 1.2 \par \par # SLE\par - he is on Plaquenil which can cause cardiotoxicity with longterm use, will continue to follow surveillance echocardiography \par - he is also on MMF\par \par # Osteoporosis , likely from prednisone use \par - pending possible hip surgery\par \par # DVT with + lupus anticoagulant\par - he remains on coumadin by his PMD \par \par RTC in 6 months

## 2022-10-25 NOTE — HISTORY OF PRESENT ILLNESS
[FreeTextEntry1] : \par Demetrius Barnes is a 31 YO M with a history of HF with recovered ejection fraction and SLE presenting to HF clinic for for further management. \par \par He was diagnosed with a cardiomyopathy at the age of 25 in 2007. He had low normal LV function with refractory NSVT requiring amiodarone and an endomyocardial biopsy was performed which was inconclusive for lupus myocarditis but did reveal acute inflammation. He underwent a EPS in 2010 where no VT was induced and his amiodarone was discontinued. He has had documented LVEF's as low as 37% but more recently he has had low-normal LV function on medical therapy. \par \par He presents today for followup. He feels well overall. He continues to note palpitations which are rare (every 4 months) and last < 1 minute at a time. Denies syncope. Denies dyspnea on exertion or lower extremity edema. Denies dizziness or lightheadedness. Denies chest pain or chest pressure.

## 2022-10-26 NOTE — DIETITIAN INITIAL EVALUATION ADULT. - PROBLEM/PLAN-5
PAST MEDICAL HISTORY:  2019 novel coronavirus disease (COVID-19)     Anxiety and depression     Breast CA     ETOH abuse     Fibromyalgia     Greater trochanteric bursitis of left hip     History of IBS     Hypertension     Hypothyroid     Peripheral neuropathy     TIA (transient ischemic attack)     Vasculitis     
DISPLAY PLAN FREE TEXT

## 2022-10-27 ENCOUNTER — LABORATORY RESULT (OUTPATIENT)
Age: 32
End: 2022-10-27

## 2022-10-27 ENCOUNTER — APPOINTMENT (OUTPATIENT)
Dept: RHEUMATOLOGY | Facility: CLINIC | Age: 32
End: 2022-10-27

## 2022-10-27 VITALS
RESPIRATION RATE: 16 BRPM | HEART RATE: 77 BPM | OXYGEN SATURATION: 95 % | SYSTOLIC BLOOD PRESSURE: 106 MMHG | TEMPERATURE: 97.8 F | DIASTOLIC BLOOD PRESSURE: 72 MMHG

## 2022-10-27 VITALS
OXYGEN SATURATION: 98 % | HEART RATE: 63 BPM | DIASTOLIC BLOOD PRESSURE: 73 MMHG | SYSTOLIC BLOOD PRESSURE: 115 MMHG | RESPIRATION RATE: 16 BRPM

## 2022-10-27 PROCEDURE — 36415 COLL VENOUS BLD VENIPUNCTURE: CPT

## 2022-10-27 PROCEDURE — 96413 CHEMO IV INFUSION 1 HR: CPT

## 2022-10-27 RX ORDER — BELIMUMAB 400 MG/5ML
400 INJECTION, POWDER, LYOPHILIZED, FOR SOLUTION INTRAVENOUS
Qty: 1 | Refills: 0 | Status: COMPLETED | OUTPATIENT
Start: 2022-10-07

## 2022-10-27 RX ORDER — BELIMUMAB 400 MG/5ML
400 INJECTION, POWDER, LYOPHILIZED, FOR SOLUTION INTRAVENOUS
Qty: 1 | Refills: 0 | Status: COMPLETED | OUTPATIENT
Start: 2022-06-17

## 2022-11-16 ENCOUNTER — APPOINTMENT (OUTPATIENT)
Dept: CARDIOLOGY | Facility: CLINIC | Age: 32
End: 2022-11-16

## 2022-11-16 PROCEDURE — 93306 TTE W/DOPPLER COMPLETE: CPT

## 2022-11-20 ENCOUNTER — LABORATORY RESULT (OUTPATIENT)
Age: 32
End: 2022-11-20

## 2022-11-21 ENCOUNTER — APPOINTMENT (OUTPATIENT)
Dept: RHEUMATOLOGY | Facility: CLINIC | Age: 32
End: 2022-11-21

## 2022-11-21 VITALS
DIASTOLIC BLOOD PRESSURE: 80 MMHG | RESPIRATION RATE: 16 BRPM | OXYGEN SATURATION: 97 % | SYSTOLIC BLOOD PRESSURE: 132 MMHG | HEART RATE: 75 BPM

## 2022-11-21 VITALS
DIASTOLIC BLOOD PRESSURE: 72 MMHG | HEART RATE: 86 BPM | TEMPERATURE: 98.3 F | SYSTOLIC BLOOD PRESSURE: 120 MMHG | OXYGEN SATURATION: 98 % | RESPIRATION RATE: 16 BRPM

## 2022-11-21 PROCEDURE — 36415 COLL VENOUS BLD VENIPUNCTURE: CPT

## 2022-11-21 PROCEDURE — 96413 CHEMO IV INFUSION 1 HR: CPT

## 2022-11-21 RX ORDER — BELIMUMAB 400 MG/5ML
400 INJECTION, POWDER, LYOPHILIZED, FOR SOLUTION INTRAVENOUS
Qty: 1 | Refills: 0 | Status: COMPLETED | OUTPATIENT
Start: 2022-11-04

## 2022-11-21 NOTE — HISTORY OF PRESENT ILLNESS
[N/A] : N/A [Denies] : Denies [No] : No [Yes] : Yes [Declined] : Declined [Informed consent documented in EHR.] : Informed consent documented in EHR. [Right upper extremity] : Right upper extremity [22g] : 22g [Start Time: ___] : Medication Start Time: [unfilled] [End Time: ___] : Medication End Time: [unfilled] [IV discontinued. Intact. No signs or symptoms of IV complications noted. Time: ___] : IV discontinued. Intact. No signs or symptoms of IV complications noted. Time: [unfilled] [Patient  instructed to seek medical attention with signs and symptoms of adverse effects] : Patient  instructed to seek medical attention with signs and symptoms of adverse effects [Patient left unit in no acute distress] : Patient left unit in no acute distress [Medications administered as ordered and tolerated well.] : Medications administered as ordered and tolerated well. [Blood drawn at time of visit] : Blood drawn at time of visit [de-identified] : Benlysta 800mg  [de-identified] : Pt. is here for scheduled Benlysta infusion. Patient states he is doing well, he denies any recent infection/ abx use. He denies any pain/discomfort. No complaints at this time. In conclusion, patient tolerated infusion well. Discharged to home in no distress.

## 2022-12-27 ENCOUNTER — APPOINTMENT (OUTPATIENT)
Dept: RHEUMATOLOGY | Facility: CLINIC | Age: 32
End: 2022-12-27

## 2022-12-29 ENCOUNTER — EMERGENCY (EMERGENCY)
Facility: HOSPITAL | Age: 32
LOS: 1 days | Discharge: HOME | End: 2022-12-29
Attending: EMERGENCY MEDICINE
Payer: MEDICAID

## 2022-12-29 VITALS
HEART RATE: 84 BPM | RESPIRATION RATE: 16 BRPM | DIASTOLIC BLOOD PRESSURE: 73 MMHG | TEMPERATURE: 98 F | OXYGEN SATURATION: 96 % | SYSTOLIC BLOOD PRESSURE: 112 MMHG

## 2022-12-29 VITALS
SYSTOLIC BLOOD PRESSURE: 111 MMHG | WEIGHT: 205.03 LBS | RESPIRATION RATE: 16 BRPM | OXYGEN SATURATION: 95 % | HEART RATE: 79 BPM | TEMPERATURE: 98 F | DIASTOLIC BLOOD PRESSURE: 76 MMHG | HEIGHT: 68 IN

## 2022-12-29 LAB
ALBUMIN SERPL ELPH-MCNC: 3.7 G/DL — SIGNIFICANT CHANGE UP (ref 3.3–5)
ALP SERPL-CCNC: 78 U/L — SIGNIFICANT CHANGE UP (ref 40–120)
ALT FLD-CCNC: 19 U/L — SIGNIFICANT CHANGE UP (ref 10–45)
ANION GAP SERPL CALC-SCNC: 10 MMOL/L — SIGNIFICANT CHANGE UP (ref 5–17)
APTT BLD: 53.8 SEC — HIGH (ref 27.5–35.5)
AST SERPL-CCNC: 36 U/L — SIGNIFICANT CHANGE UP (ref 10–40)
BASOPHILS # BLD AUTO: 0.02 K/UL — SIGNIFICANT CHANGE UP (ref 0–0.2)
BASOPHILS NFR BLD AUTO: 0.3 % — SIGNIFICANT CHANGE UP (ref 0–2)
BILIRUB SERPL-MCNC: 0.6 MG/DL — SIGNIFICANT CHANGE UP (ref 0.2–1.2)
BUN SERPL-MCNC: 14 MG/DL — SIGNIFICANT CHANGE UP (ref 7–23)
CALCIUM SERPL-MCNC: 8.8 MG/DL — SIGNIFICANT CHANGE UP (ref 8.4–10.5)
CHLORIDE SERPL-SCNC: 103 MMOL/L — SIGNIFICANT CHANGE UP (ref 96–108)
CO2 SERPL-SCNC: 25 MMOL/L — SIGNIFICANT CHANGE UP (ref 22–31)
CREAT SERPL-MCNC: 1.2 MG/DL — SIGNIFICANT CHANGE UP (ref 0.5–1.3)
EGFR: 83 ML/MIN/1.73M2 — SIGNIFICANT CHANGE UP
EOSINOPHIL # BLD AUTO: 0.06 K/UL — SIGNIFICANT CHANGE UP (ref 0–0.5)
EOSINOPHIL NFR BLD AUTO: 1 % — SIGNIFICANT CHANGE UP (ref 0–6)
GLUCOSE SERPL-MCNC: 99 MG/DL — SIGNIFICANT CHANGE UP (ref 70–99)
HCT VFR BLD CALC: 44.3 % — SIGNIFICANT CHANGE UP (ref 39–50)
HGB BLD-MCNC: 14.2 G/DL — SIGNIFICANT CHANGE UP (ref 13–17)
IMM GRANULOCYTES NFR BLD AUTO: 0.3 % — SIGNIFICANT CHANGE UP (ref 0–0.9)
INR BLD: 3.68 RATIO — HIGH (ref 0.88–1.16)
LYMPHOCYTES # BLD AUTO: 1.33 K/UL — SIGNIFICANT CHANGE UP (ref 1–3.3)
LYMPHOCYTES # BLD AUTO: 23.1 % — SIGNIFICANT CHANGE UP (ref 13–44)
MCHC RBC-ENTMCNC: 29.7 PG — SIGNIFICANT CHANGE UP (ref 27–34)
MCHC RBC-ENTMCNC: 32.1 GM/DL — SIGNIFICANT CHANGE UP (ref 32–36)
MCV RBC AUTO: 92.7 FL — SIGNIFICANT CHANGE UP (ref 80–100)
MONOCYTES # BLD AUTO: 0.6 K/UL — SIGNIFICANT CHANGE UP (ref 0–0.9)
MONOCYTES NFR BLD AUTO: 10.4 % — SIGNIFICANT CHANGE UP (ref 2–14)
NEUTROPHILS # BLD AUTO: 3.73 K/UL — SIGNIFICANT CHANGE UP (ref 1.8–7.4)
NEUTROPHILS NFR BLD AUTO: 64.9 % — SIGNIFICANT CHANGE UP (ref 43–77)
NRBC # BLD: 0 /100 WBCS — SIGNIFICANT CHANGE UP (ref 0–0)
PLATELET # BLD AUTO: 184 K/UL — SIGNIFICANT CHANGE UP (ref 150–400)
POTASSIUM SERPL-MCNC: 4 MMOL/L — SIGNIFICANT CHANGE UP (ref 3.5–5.3)
POTASSIUM SERPL-SCNC: 4 MMOL/L — SIGNIFICANT CHANGE UP (ref 3.5–5.3)
PROT SERPL-MCNC: 6.8 G/DL — SIGNIFICANT CHANGE UP (ref 6–8.3)
PROTHROM AB SERPL-ACNC: 43.2 SEC — HIGH (ref 10.5–13.4)
RBC # BLD: 4.78 M/UL — SIGNIFICANT CHANGE UP (ref 4.2–5.8)
RBC # FLD: 13.1 % — SIGNIFICANT CHANGE UP (ref 10.3–14.5)
SODIUM SERPL-SCNC: 138 MMOL/L — SIGNIFICANT CHANGE UP (ref 135–145)
WBC # BLD: 5.76 K/UL — SIGNIFICANT CHANGE UP (ref 3.8–10.5)
WBC # FLD AUTO: 5.76 K/UL — SIGNIFICANT CHANGE UP (ref 3.8–10.5)

## 2022-12-29 PROCEDURE — 80053 COMPREHEN METABOLIC PANEL: CPT

## 2022-12-29 PROCEDURE — 99284 EMERGENCY DEPT VISIT MOD MDM: CPT

## 2022-12-29 PROCEDURE — 85730 THROMBOPLASTIN TIME PARTIAL: CPT

## 2022-12-29 PROCEDURE — 85025 COMPLETE CBC W/AUTO DIFF WBC: CPT

## 2022-12-29 PROCEDURE — 99283 EMERGENCY DEPT VISIT LOW MDM: CPT

## 2022-12-29 PROCEDURE — 36415 COLL VENOUS BLD VENIPUNCTURE: CPT

## 2022-12-29 PROCEDURE — 85610 PROTHROMBIN TIME: CPT

## 2022-12-29 RX ORDER — PHYTONADIONE (VIT K1) 5 MG
2.5 TABLET ORAL ONCE
Refills: 0 | Status: COMPLETED | OUTPATIENT
Start: 2022-12-29 | End: 2022-12-29

## 2022-12-29 RX ADMIN — Medication 2.5 MILLIGRAM(S): at 14:08

## 2022-12-29 NOTE — ED PROVIDER NOTE - PROGRESS NOTE DETAILS
Ameena Jimenez) [facility] PGY-1: called PCP office. Left message with nurse. State would chat with PCP and call back

## 2022-12-29 NOTE — ED PROVIDER NOTE - ATTENDING CONTRIBUTION TO CARE
------------ATTENDING NOTE------------  pt c/o one week of nasal congestion, clear rhinorrhea, unproductive cough, c/w viral uri, no fevers, over past 12 hrs w/ small amts of R anterior nostril bleeding, complicated as on Warfarin for hypercoagulable state, no additional abnormal bleeding/bruising, no current epistaxis, awaiting labs and close reassessments -->  - Danie Cavanaugh MD   ---------------------------------------------- ------------ATTENDING NOTE------------  pt c/o one week of nasal congestion, clear rhinorrhea, unproductive cough, c/w viral uri, no fevers, over past 12 hrs w/ small amts of R anterior nostril bleeding, complicated as on Warfarin for hypercoagulable state, no additional abnormal bleeding/bruising, no current epistaxis, awaiting labs and close reassessments --> neurotherapeutic INR, only small amount of oozing blood on ED arrival but 2+ hrs in ED w/o active bleeding, d/w pt's outpt team about Vit K/Warfarin dosing, nml VS at PA, in depth dw amor keith ddx, tx, herman, continued close outpt fu.  - Danie Cavanaugh MD   ----------------------------------------------

## 2022-12-29 NOTE — ED ADULT NURSE NOTE - OBJECTIVE STATEMENT
31 y/o M A&Ox3 PMH lupus, myocarditis, DVT denies PSH presents to the ED from home c/o epistaxis. Pt reports recent change in Coumadin dosage, pt did not get repeat blood work after medication change. Pt States his nose started bleeding on & off x2 days ago. Pt states this morning his nose began to bleed again and he could not get it to stop. Upon ED arrival pt is well appearing. Breathing is even and unlabored. Skin is warm, dry & in tact. Bleeding stopped on ED arrival. Denies CP, SOB, difficulty breathing, N/V/D, numbness, tingling, fevers, chills. IV access obtained. Comfort & safety provided.

## 2022-12-29 NOTE — ED PROVIDER NOTE - PHYSICAL EXAMINATION
GENERAL: Alert. No acute distress.   EYES: EOMI grossly normal. Anicteric.  HENT: Moist mucous membranes. No active bleeding in nares. Dry blood.   RESP: No conversation dyspnea, no resp distress  ABD: soft, nttp, nondistended  MSK: ROM grossly normal in all 4 extremities. No deformities  SKIN: warm and dry  NEUROLOGIC: Alert and oriented x3  PSYCHIATRIC: Cooperative. Appropriate mood and affect

## 2022-12-29 NOTE — ED PROVIDER NOTE - OBJECTIVE STATEMENT
This is a 32-year-old male patient with past medical history of lupus, lupus nephritis, tachycardia (unknown type) and CHF (last echo 2018, EF 55-60%), previous DVT and PE (on coumadin) who he here for intermittent nosebleed for the past 12 hours. No SOB, no dizziness, no other bleeding noticed. States loss a significant amount of blood. Recently INR level was flucuating, and PCP had been adjusting doses of warfarin. Weekend hookah smoker

## 2022-12-29 NOTE — ED PROVIDER NOTE - NSFOLLOWUPINSTRUCTIONS_ED_ALL_ED_FT
See your Primary Doctor / Specialists and ENT CLINIC (rapid referral) in next week for follow up -- call to discuss.    Stay well hydrated, eat regular healthy diet, continue current medications/treatments.    See EPISTAXIS information and return instructions given to you.    Seek immediate medical care for new/worsening symptoms/concerns. See your Primary Doctor / Specialists and ENT CLINIC (rapid referral) in next week for follow up -- call to discuss.    Stay well hydrated, eat regular healthy diet, HOLD NEXT WARFARIN DOSE.    See EPISTAXIS information and return instructions given to you.    Seek immediate medical care for new/worsening symptoms/concerns.

## 2022-12-29 NOTE — ED PROVIDER NOTE - NS ED ROS FT
CONSTITUTIONAL: No fever or chill  HEENT: Denies changes in vision and hearing.+ nose bleed.   RESPIRATORY: Denies SOB and cough.  CV: Denies CP.   GI: Denies abdominal pain, nausea, vomiting and diarrhea.  : Denies dysuria and urinary frequency.  MS: + join pain, at baseline  SKIN: Denies rash   NEUROLOGICAL: [id number] headache and syncope.

## 2022-12-29 NOTE — ED PROVIDER NOTE - PATIENT PORTAL LINK FT
You can access the FollowMyHealth Patient Portal offered by [facility] by registering at the following website: [email or web address]. By joining [employer]’s FollowMyHealth portal, you will also be able to view your health information using other applications (apps) compatible with our system.

## 2022-12-29 NOTE — ED PROVIDER NOTE - CARE PLAN
1 Principal Discharge DX:	Right-sided epistaxis   Lap band Principal Discharge DX:	Right-sided epistaxis  Secondary Diagnosis:	Supratherapeutic INR

## 2023-01-04 ENCOUNTER — APPOINTMENT (OUTPATIENT)
Dept: CV DIAGNOSITCS | Facility: HOSPITAL | Age: 33
End: 2023-01-04

## 2023-01-05 ENCOUNTER — LABORATORY RESULT (OUTPATIENT)
Age: 33
End: 2023-01-05

## 2023-01-05 ENCOUNTER — APPOINTMENT (OUTPATIENT)
Dept: RHEUMATOLOGY | Facility: CLINIC | Age: 33
End: 2023-01-05
Payer: MEDICAID

## 2023-01-05 VITALS
HEART RATE: 90 BPM | DIASTOLIC BLOOD PRESSURE: 76 MMHG | SYSTOLIC BLOOD PRESSURE: 112 MMHG | RESPIRATION RATE: 16 BRPM | OXYGEN SATURATION: 95 %

## 2023-01-05 VITALS
SYSTOLIC BLOOD PRESSURE: 113 MMHG | DIASTOLIC BLOOD PRESSURE: 75 MMHG | HEART RATE: 71 BPM | OXYGEN SATURATION: 95 % | TEMPERATURE: 98 F | RESPIRATION RATE: 16 BRPM

## 2023-01-05 PROCEDURE — 96413 CHEMO IV INFUSION 1 HR: CPT

## 2023-01-05 PROCEDURE — 36415 COLL VENOUS BLD VENIPUNCTURE: CPT

## 2023-01-05 RX ORDER — BELIMUMAB 400 MG/5ML
400 INJECTION, POWDER, LYOPHILIZED, FOR SOLUTION INTRAVENOUS
Qty: 1 | Refills: 0 | Status: COMPLETED | OUTPATIENT
Start: 2022-12-30

## 2023-01-06 NOTE — HISTORY OF PRESENT ILLNESS
[8] : 8 [N/A] : N/A [Denies] : Denies [No] : No [Yes] : Yes [Declined] : Declined [Right upper extremity] : Right upper extremity [22g] : 22g [Start Time: ___] : Medication Start Time: [unfilled] [End Time: ___] : Medication End Time: [unfilled] [IV discontinued. Intact. No signs or symptoms of IV complications noted. Time: ___] : IV discontinued. Intact. No signs or symptoms of IV complications noted. Time: [unfilled] [Patient  instructed to seek medical attention with signs and symptoms of adverse effects] : Patient  instructed to seek medical attention with signs and symptoms of adverse effects [Patient left unit in no acute distress] : Patient left unit in no acute distress [Medications administered as ordered and tolerated well.] : Medications administered as ordered and tolerated well. [Blood drawn at time of visit] : Blood drawn at time of visit [de-identified] : Fingers, feet, B/L knee, (R) hip, (R) wrist [de-identified] : right radial vein; wrist [de-identified] : Routine labs drawn as per protocol [de-identified] : Patient presents for Benlysta infusion, doing well overall. Patient reports recent sinus infection, has a lingering wet cough and sore throat. Patient reports previously being on amoxicillin and cough medicine, had an allergic reaction to amoxicillin and took 10.5MG of warfarin for 2 days. Patient report rash to right and left side of upper chest and left side of face. Patient denies any other symptoms or concerns. Patient tolerated infusion well.

## 2023-01-24 ENCOUNTER — RX RENEWAL (OUTPATIENT)
Age: 33
End: 2023-01-24

## 2023-01-25 LAB
ANION GAP SERPL CALC-SCNC: 12 MMOL/L
APPEARANCE: CLEAR
BACTERIA: NEGATIVE
BILIRUBIN URINE: NEGATIVE
BLOOD URINE: NEGATIVE
BUN SERPL-MCNC: 14 MG/DL
C3 SERPL-MCNC: 62 MG/DL
C4 SERPL-MCNC: 8 MG/DL
CALCIUM SERPL-MCNC: 9.3 MG/DL
CHLORIDE SERPL-SCNC: 101 MMOL/L
CO2 SERPL-SCNC: 26 MMOL/L
COLOR: YELLOW
CREAT SERPL-MCNC: 1.24 MG/DL
CREAT SPEC-SCNC: 341 MG/DL
CREAT/PROT UR: 0.4 RATIO
DSDNA AB SER-ACNC: 947 IU/ML
EGFR: 80 ML/MIN/1.73M2
GLUCOSE QUALITATIVE U: NEGATIVE
GLUCOSE SERPL-MCNC: 95 MG/DL
HYALINE CASTS: 0 /LPF
KETONES URINE: NEGATIVE
LEUKOCYTE ESTERASE URINE: NEGATIVE
MICROSCOPIC-UA: NORMAL
NITRITE URINE: NEGATIVE
PH URINE: 6
POTASSIUM SERPL-SCNC: 5 MMOL/L
PROT UR-MCNC: 151 MG/DL
PROTEIN URINE: ABNORMAL
RED BLOOD CELLS URINE: 3 /HPF
SODIUM SERPL-SCNC: 139 MMOL/L
SPECIFIC GRAVITY URINE: 1.03
SQUAMOUS EPITHELIAL CELLS: 1 /HPF
UROBILINOGEN URINE: NORMAL
WHITE BLOOD CELLS URINE: 7 /HPF

## 2023-01-25 NOTE — PROGRESS NOTE ADULT - PROBLEM/PLAN-7
DISPLAY PLAN FREE TEXT
Dermal Autograft Text: The defect edges were debeveled with a #15 scalpel blade.  Given the location of the defect, shape of the defect and the proximity to free margins a dermal autograft was deemed most appropriate.  Using a sterile surgical marker, the primary defect shape was transferred to the donor site. The area thus outlined was incised deep to adipose tissue with a #15 scalpel blade.  The harvested graft was then trimmed of adipose and epidermal tissue until only dermis was left.  The skin graft was then placed in the primary defect and oriented appropriately.

## 2023-01-26 LAB
ENA RNP AB SER IA-ACNC: >8 AL
ENA SM AB SER IA-ACNC: >8 AL
ENA SS-A AB SER IA-ACNC: 5.1 AL
ENA SS-B AB SER IA-ACNC: <0.2 AL

## 2023-02-02 ENCOUNTER — APPOINTMENT (OUTPATIENT)
Dept: RHEUMATOLOGY | Facility: CLINIC | Age: 33
End: 2023-02-02
Payer: MEDICAID

## 2023-02-02 VITALS
OXYGEN SATURATION: 96 % | SYSTOLIC BLOOD PRESSURE: 109 MMHG | DIASTOLIC BLOOD PRESSURE: 73 MMHG | HEART RATE: 70 BPM | RESPIRATION RATE: 16 BRPM | TEMPERATURE: 98.3 F

## 2023-02-02 VITALS — SYSTOLIC BLOOD PRESSURE: 121 MMHG | OXYGEN SATURATION: 99 % | HEART RATE: 77 BPM | DIASTOLIC BLOOD PRESSURE: 77 MMHG

## 2023-02-02 PROCEDURE — 96413 CHEMO IV INFUSION 1 HR: CPT

## 2023-02-02 RX ORDER — BELIMUMAB 400 MG/5ML
400 INJECTION, POWDER, LYOPHILIZED, FOR SOLUTION INTRAVENOUS
Qty: 1 | Refills: 0 | Status: COMPLETED | OUTPATIENT
Start: 2022-12-02

## 2023-02-02 NOTE — HISTORY OF PRESENT ILLNESS
[4] : 4 [N/A] : N/A [Denies] : Denies [No] : No [Yes] : Yes [Declined] : Declined [de-identified] : Fingers, feet, B/L knee, (R) hip, (R) wrist [Right upper extremity] : Right upper extremity [22g] : 22g [Start Time: ___] : Medication Start Time: [unfilled] [End Time: ___] : Medication End Time: [unfilled] [IV discontinued. Intact. No signs or symptoms of IV complications noted. Time: ___] : IV discontinued. Intact. No signs or symptoms of IV complications noted. Time: [unfilled] [Patient  instructed to seek medical attention with signs and symptoms of adverse effects] : Patient  instructed to seek medical attention with signs and symptoms of adverse effects [Patient left unit in no acute distress] : Patient left unit in no acute distress [Medications administered as ordered and tolerated well.] : Medications administered as ordered and tolerated well. [Blood drawn at time of visit] : Blood drawn at time of visit [de-identified] : median cephalic [de-identified] : Routine labs drawn as per protocol [de-identified] : Patient presents for Benlysta infusion, doing well overall. Patient reports continued recovery from infection 2 months prior. Patient continues to report rash to right and left side of upper chest and left side of face. Patient denies any other symptoms or concerns. Patient tolerated infusion well.

## 2023-02-03 ENCOUNTER — NON-APPOINTMENT (OUTPATIENT)
Age: 33
End: 2023-02-03

## 2023-02-03 LAB
ALBUMIN SERPL ELPH-MCNC: 3.8 G/DL
ALP BLD-CCNC: 77 U/L
ALT SERPL-CCNC: 20 U/L
ANION GAP SERPL CALC-SCNC: 14 MMOL/L
APPEARANCE: CLEAR
AST SERPL-CCNC: 38 U/L
BACTERIA: NEGATIVE
BASOPHILS # BLD AUTO: 0.01 K/UL
BASOPHILS NFR BLD AUTO: 0.2 %
BILIRUB SERPL-MCNC: 0.6 MG/DL
BILIRUBIN URINE: NEGATIVE
BLOOD URINE: NEGATIVE
BUN SERPL-MCNC: 14 MG/DL
C3 SERPL-MCNC: 51 MG/DL
C4 SERPL-MCNC: 9 MG/DL
CALCIUM SERPL-MCNC: 9.6 MG/DL
CHLORIDE SERPL-SCNC: 101 MMOL/L
CK SERPL-CCNC: 683 U/L
CO2 SERPL-SCNC: 24 MMOL/L
COLOR: NORMAL
CREAT SERPL-MCNC: 1.35 MG/DL
CREAT SPEC-SCNC: 155 MG/DL
CREAT/PROT UR: 0.8 RATIO
DSDNA AB SER-ACNC: 921 IU/ML
EGFR: 72 ML/MIN/1.73M2
EOSINOPHIL # BLD AUTO: 0.04 K/UL
EOSINOPHIL NFR BLD AUTO: 0.7 %
GLUCOSE QUALITATIVE U: NEGATIVE
HCT VFR BLD CALC: 42.3 %
HGB BLD-MCNC: 13.5 G/DL
HYALINE CASTS: 1 /LPF
IMM GRANULOCYTES NFR BLD AUTO: 0.2 %
KETONES URINE: NEGATIVE
LEUKOCYTE ESTERASE URINE: NEGATIVE
LYMPHOCYTES # BLD AUTO: 1.28 K/UL
LYMPHOCYTES NFR BLD AUTO: 23.9 %
MAN DIFF?: NORMAL
MCHC RBC-ENTMCNC: 29.6 PG
MCHC RBC-ENTMCNC: 31.9 GM/DL
MCV RBC AUTO: 92.8 FL
MICROSCOPIC-UA: NORMAL
MONOCYTES # BLD AUTO: 0.42 K/UL
MONOCYTES NFR BLD AUTO: 7.9 %
NEUTROPHILS # BLD AUTO: 3.59 K/UL
NEUTROPHILS NFR BLD AUTO: 67.1 %
NITRITE URINE: NEGATIVE
PH URINE: 6
PLATELET # BLD AUTO: NORMAL K/UL
POTASSIUM SERPL-SCNC: 4.7 MMOL/L
PROT SERPL-MCNC: 6.6 G/DL
PROT UR-MCNC: 121 MG/DL
PROTEIN URINE: ABNORMAL
RBC # BLD: 4.56 M/UL
RBC # FLD: 13.2 %
RED BLOOD CELLS URINE: 2 /HPF
SODIUM SERPL-SCNC: 139 MMOL/L
SPECIFIC GRAVITY URINE: 1.02
SQUAMOUS EPITHELIAL CELLS: 0 /HPF
UROBILINOGEN URINE: NORMAL
WBC # FLD AUTO: 5.35 K/UL
WHITE BLOOD CELLS URINE: 1 /HPF

## 2023-02-14 ENCOUNTER — APPOINTMENT (OUTPATIENT)
Dept: RHEUMATOLOGY | Facility: CLINIC | Age: 33
End: 2023-02-14
Payer: MEDICAID

## 2023-02-14 VITALS
RESPIRATION RATE: 16 BRPM | HEART RATE: 98 BPM | WEIGHT: 213 LBS | SYSTOLIC BLOOD PRESSURE: 122 MMHG | OXYGEN SATURATION: 98 % | DIASTOLIC BLOOD PRESSURE: 80 MMHG | TEMPERATURE: 97.7 F | HEIGHT: 67 IN | BODY MASS INDEX: 33.43 KG/M2

## 2023-02-14 PROCEDURE — 90732 PPSV23 VACC 2 YRS+ SUBQ/IM: CPT

## 2023-02-14 PROCEDURE — 99215 OFFICE O/P EST HI 40 MIN: CPT | Mod: 25

## 2023-02-14 PROCEDURE — G0009: CPT

## 2023-02-14 NOTE — REVIEW OF SYSTEMS
[Feeling Poorly] : feeling poorly [Feeling Tired] : feeling tired [As noted in HPI] : as noted in HPI [As Noted in HPI] : as noted in HPI [Negative] : Endocrine

## 2023-02-15 LAB
ALBUMIN SERPL ELPH-MCNC: 4.1 G/DL
ALP BLD-CCNC: 79 U/L
ALT SERPL-CCNC: 25 U/L
ANION GAP SERPL CALC-SCNC: 12 MMOL/L
APPEARANCE: CLEAR
APTT BLD: 50 SEC
AST SERPL-CCNC: 43 U/L
BACTERIA: NEGATIVE
BASOPHILS # BLD AUTO: 0.02 K/UL
BASOPHILS NFR BLD AUTO: 0.4 %
BILIRUB SERPL-MCNC: 0.6 MG/DL
BILIRUBIN URINE: NEGATIVE
BLOOD URINE: NEGATIVE
BUN SERPL-MCNC: 13 MG/DL
C3 SERPL-MCNC: 53 MG/DL
C4 SERPL-MCNC: 8 MG/DL
CALCIUM SERPL-MCNC: 9.3 MG/DL
CD16+CD56+ CELLS # BLD: 121 CELLS/UL
CD16+CD56+ CELLS NFR BLD: 12 %
CD19 CELLS NFR BLD: 44 CELLS/UL
CD3 CELLS # BLD: 846 CELLS/UL
CD3 CELLS NFR BLD: 81 %
CD3+CD4+ CELLS # BLD: 520 CELLS/UL
CD3+CD4+ CELLS NFR BLD: 49 %
CD3+CD4+ CELLS/CD3+CD8+ CLL SPEC: 1.69 RATIO
CD3+CD8+ CELLS # SPEC: 308 CELLS/UL
CD3+CD8+ CELLS NFR BLD: 29 %
CELLS.CD3-CD19+/CELLS IN BLOOD: 4 %
CHLORIDE SERPL-SCNC: 104 MMOL/L
CK SERPL-CCNC: 710 U/L
CO2 SERPL-SCNC: 28 MMOL/L
COLOR: YELLOW
CONFIRM: 56.5 SEC
CREAT SERPL-MCNC: 1.46 MG/DL
CREAT SPEC-SCNC: 442 MG/DL
CREAT/PROT UR: 1 RATIO
DEPRECATED KAPPA LC FREE/LAMBDA SER: 1.62 RATIO
DRVVT IMM 1:2 NP PPP: ABNORMAL
DRVVT SCREEN TO CONFIRM RATIO: 1.39 RATIO
EGFR: 66 ML/MIN/1.73M2
EOSINOPHIL # BLD AUTO: 0.03 K/UL
EOSINOPHIL NFR BLD AUTO: 0.5 %
GLUCOSE QUALITATIVE U: NEGATIVE
HCT VFR BLD CALC: 42.4 %
HGB BLD-MCNC: 13.2 G/DL
HYALINE CASTS: 2 /LPF
IGA SER QL IEP: 268 MG/DL
IGG SER QL IEP: 1408 MG/DL
IGM SER QL IEP: 41 MG/DL
IMM GRANULOCYTES NFR BLD AUTO: 0.2 %
INR PPP: 3.26 RATIO
KAPPA LC CSF-MCNC: 4.12 MG/DL
KAPPA LC SERPL-MCNC: 6.66 MG/DL
KETONES URINE: NEGATIVE
LEUKOCYTE ESTERASE URINE: NEGATIVE
LYMPHOCYTES # BLD AUTO: 0.95 K/UL
LYMPHOCYTES NFR BLD AUTO: 16.6 %
MAN DIFF?: NORMAL
MCHC RBC-ENTMCNC: 29.3 PG
MCHC RBC-ENTMCNC: 31.1 GM/DL
MCV RBC AUTO: 94 FL
MICROSCOPIC-UA: NORMAL
MONOCYTES # BLD AUTO: 0.48 K/UL
MONOCYTES NFR BLD AUTO: 8.4 %
NEUTROPHILS # BLD AUTO: 4.22 K/UL
NEUTROPHILS NFR BLD AUTO: 73.9 %
NITRITE URINE: NEGATIVE
PH URINE: 6.5
PLATELET # BLD AUTO: 139 K/UL
POTASSIUM SERPL-SCNC: 4 MMOL/L
PROT SERPL-MCNC: 6.7 G/DL
PROT UR-MCNC: 420 MG/DL
PROTEIN URINE: ABNORMAL
PT BLD: 38.3 SEC
RBC # BLD: 4.51 M/UL
RBC # FLD: 13.3 %
RED BLOOD CELLS URINE: 6 /HPF
SCREEN DRVVT: 94 SEC
SILICA CLOTTING TIME INTERPRETATION: NORMAL
SILICA CLOTTING TIME S/C: 0.75 RATIO
SODIUM SERPL-SCNC: 144 MMOL/L
SPECIFIC GRAVITY URINE: 1.03
SQUAMOUS EPITHELIAL CELLS: 2 /HPF
UROBILINOGEN URINE: NORMAL
WBC # FLD AUTO: 5.71 K/UL
WHITE BLOOD CELLS URINE: 14 /HPF

## 2023-02-15 NOTE — HISTORY OF PRESENT ILLNESS
[FreeTextEntry1] : 1.  SLE: In 2007 he developed fatigue and chest pain for which he was admitted to [facility].  The details are lacking, but he was diagnosed with pericarditis and myocarditis.  At that time a diagnosis of SLE was made, and he was treated with prednisone and enalapril.  He subsequently developed a butterfly rash, polyarthritis, and Raynauds.  In 2013 he had a DVT of the right leg complicated by a PE.  His disease stabilized, but in April 2016 he flared with marked alopecia, fatigue, and arthritis.  By the summer 2016 his disease was under control.  While serologically active, he had little in the way of active clinical disease until the fall 2016.  He had two admissions to [facility] for fever and cough.  The initial diagnosis was that of a viral infection.  He was discharged but readmitted once again for fever and cough. After an extensive evaluation, which failed to identify an infectious cause, the prednisone was increased to 20 mg/d.  CT scan of the chest failed to demonstrate parenchymal disease.  Since going home he has continued with low grade fever and cough.  No localizing symptoms.  He also had myalgias and arthralgias, profound fatigue, "itchy" hands.  Several changes to medications were made, chief of which was to change to methylprednisolone and increase the dose.  He felt much better with near disappearance of cough, a return of energy, disappearance of fever.  However, he had joint pain and swelling in the hands as well as pain in shoulders, ankles, and knees. He in early Dec 2016 developed cutaneous vasculitis of the legs as well as periungual infarcts. A kidney biopsy was performed because of rising proteinuria (see below).  He enrolled in the BLISS-LN study and did very well.  After the completion of the study, he was maintained on commercial Benlysta and did well.  However, in early 2023 following a sinus infection, his disease seemed to flare with rash, arthritis, increased serologies, and increased proteinuria.  He had missed one dose of belimumab. \par 2.  Myocarditis: Cardiac function improved and was normal or close to it after the start of Benlysta. \par 3.  Pericarditis\par 4.  DVT/PE: chronically anticoagulated\par 5.  Vit D deficiency\par 6.  Cough:  cause unknown:  consider prolonged viral infection, lisinopril (stopped Nov. 21, 2016), SLE (though CT scan of lung in [facility] did not show parenchymal disease), allergic reaction (cats at home, drugs). Cough disappeared on higher doses of steroids (see above). \par 7.  Low DLco:  PFT's done in late 2016 demonstrated a DLco of 40% predicted.  \par 8.  Lupus Nephritis: proteinuria raised the question of kidney biopsy.  Proteinuria has been reproducible, and the need for a kidney biopsy was expressed to the patient.  Anticoagulation in the tania-procedure period discussed. \par 9.  Vasculitis:  s/p skin biopsy performed to confirm the diagnosis of vasculitis.  \par 10.  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  The official report also noted crescents in 2/21 glomeruli.  Various therapeutic options were discussed (higher dose MMF, Cytoxan, study). The decision was to maximize the MMF and screen for the belimumab study.  He  received two 500 mg doses of Solumedrol as well. He enrolled in the BLISS-LN study and did very well.  After the completion of the study, he was maintained on commercial Benlysta and did well.  However, in early 2023 following a sinus infection, his disease seemed to flare with rash, arthritis, increased serologies, and increased proteinuria.  He had missed one dose of belimumab.\par 11. Osteonecrosis: onset in hips but also in shoulders/knees.  He was seen at [facility], and according to the patient there was a difference of opinion between two orthopedists about doing a core decompression. Meanwhile, he enrolled in a study of AVN being conducted at [facility]. Surgery is in his future. \par 12. Fatigue\par \par Meds\par lisinopril 10 mg/d  \par metoprolol 200 mg/d\par Plaquenil 400 mg/d\par CellCept 1000 mg/d\par Coumadin 7.5 mg/d\par Ca\par Vit D\par Vit C\par Effexor 75 mg/d\par \par Vaccines\par Prevnar 13  8/8/16  ([id number] exp 8/17)\par PNVX 23 11/13/17\par PNVX 23 2/14/23 ([id number]; exp 9/30/23)\par Fluzone 9/15/16 ([place] 5629 KA; exp [id number])

## 2023-02-15 NOTE — ASSESSMENT
[FreeTextEntry1] : 1.  SLE: In 2007 he developed fatigue and chest pain for which he was admitted to [facility].  The details are lacking, but he was diagnosed with pericarditis and myocarditis.  At that time a diagnosis of SLE was made, and he was treated with prednisone and enalapril.  He subsequently developed a butterfly rash, polyarthritis, and Raynauds.  In 2013 he had a DVT of the right leg complicated by a PE.  His disease stabilized, but in April 2016 he flared with marked alopecia, fatigue, and arthritis.  By the summer 2016 his disease was under control.  While serologically active, he had little in the way of active clinical disease until the fall 2016.  He had two admissions to [facility] for fever and cough.  The initial diagnosis was that of a viral infection.  He was discharged but readmitted once again for fever and cough. After an extensive evaluation, which failed to identify an infectious cause, the prednisone was increased to 20 mg/d.  CT scan of the chest failed to demonstrate parenchymal disease.  Since going home he has continued with low grade fever and cough.  No localizing symptoms.  He also had myalgias and arthralgias, profound fatigue, "itchy" hands.  Several changes to medications were made, chief of which was to change to methylprednisolone and increase the dose.  He felt much better with near disappearance of cough, a return of energy, disappearance of fever.  However, he had joint pain and swelling in the hands as well as pain in shoulders, ankles, and knees. He in early Dec 2016 developed cutaneous vasculitis of the legs as well as periungual infarcts. A kidney biopsy was performed because of rising proteinuria (see below).  He enrolled in the BLISS-LN study and did very well.  After the completion of the study, he was maintained on commercial Benlysta and did well.  However, in early 2023 following a sinus infection, his disease seemed to flare with rash, arthritis, increased serologies, and increased proteinuria.  He had missed one dose of belimumab. \par 2.  Myocarditis: Cardiac function improved and was normal or close to it after the start of Benlysta. \par 3.  Pericarditis\par 4.  DVT/PE: chronically anticoagulated\par 5.  Vit D deficiency\par 6.  Cough:  cause unknown:  consider prolonged viral infection, lisinopril (stopped Nov. 21, 2016), SLE (though CT scan of lung in [facility] did not show parenchymal disease), allergic reaction (cats at home, drugs). Cough disappeared on higher doses of steroids (see above). \par 7.  Low DLco:  PFT's done in late 2016 demonstrated a DLco of 40% predicted.  \par 8.  Lupus Nephritis: proteinuria raised the question of kidney biopsy.  Proteinuria has been reproducible, and the need for a kidney biopsy was expressed to the patient.  Anticoagulation in the tania-procedure period discussed. \par 9.  Vasculitis:  s/p skin biopsy performed to confirm the diagnosis of vasculitis.  \par 10.  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  The official report also noted crescents in 2/21 glomeruli.  Various therapeutic options were discussed (higher dose MMF, Cytoxan, study). The decision was to maximize the MMF and screen for the belimumab study.  He  received two 500 mg doses of Solumedrol as well. He enrolled in the BLISS-LN study and did very well.  After the completion of the study, he was maintained on commercial Benlysta and did well.  However, in early 2023 following a sinus infection, his disease seemed to flare with rash, arthritis, increased serologies, and increased proteinuria.  He had missed one dose of belimumab.\par 11. Osteonecrosis: onset in hips but also in shoulders/knees.  He was seen at [facility], and according to the patient there was a difference of opinion between two orthopedists about doing a core decompression. Meanwhile, he enrolled in a study of AVN being conducted at [facility]. Surgery is in his future. \par 12. Fatigue\par \par Plan:\par 1.  Lab tests\par 2.  Return 4 weeks\par 3.  Increase MMF 1.0 g 2xd\par 4.  Ortho f/u\par 5.  Shingrix vaccine recommended\par 6.  PNVX 23 subcu RUE\par 7.  High risk medications used in the treatment of rheumatic diseases include steroids, disease modifying agents, immunosuppressive therapies, antimalarials, biologics, and chemotherapy. Regardless of which drug or class of drug, the potential toxicities of these therapies mandate close monitoring in the form of a history, physical, and laboratory tests.\par 8.  Systemic Lupus Erythematosus, known as lupus, is a chronic autoimmune disease that can affect any organ in the body posing threats to proper organ function and even to life. Therefore, close surveillance of all bodily functions is required, including but not limited to central and peripheral nervous system, ocular and auditory systems, cardiopulmonary function, kidney function, mucocutaneous and musculoskeletal systems as well as constitutional manifestations. Surveillance consists of history, physical, and laboratory tests. Treatment varies, but most of the drugs used are high risk and therefore also require close monitoring in the form of blood and urine tests.\par \par

## 2023-02-15 NOTE — PHYSICAL EXAM
[General Appearance - Alert] : alert [General Appearance - In No Acute Distress] : in no acute distress [General Appearance - Well Nourished] : well nourished [Sclera] : the sclera and conjunctiva were normal [Extraocular Movements] : extraocular movements were intact [Strabismus] : no strabismus was seen [Outer Ear] : the ears and nose were normal in appearance [Nasal Cavity] : the nasal mucosa and septum were normal [Oropharynx] : the oropharynx was normal [Neck Appearance] : the appearance of the neck was normal [Neck Cervical Mass (___cm)] : no neck mass was observed [Jugular Venous Distention Increased] : there was no jugular-venous distention [Thyroid Diffuse Enlargement] : the thyroid was not enlarged [Auscultation Breath Sounds / Voice Sounds] : lungs were clear to auscultation bilaterally [Heart Rate And Rhythm] : heart rate was normal and rhythm regular [Heart Sounds] : normal S1 and S2 [Heart Sounds Gallop] : no gallops [Murmurs] : no murmurs [Heart Sounds Pericardial Friction Rub] : no pericardial rub [Arterial Pulses Carotid] : carotid pulses were normal with no bruits [Full Pulse] : the pedal pulses are present [Edema] : there was no peripheral edema [Bowel Sounds] : normal bowel sounds [Abdomen Soft] : soft [Abdomen Tenderness] : non-tender [] : no hepato-splenomegaly [Abdomen Mass (___ Cm)] : no abdominal mass palpated [Cervical Lymph Nodes Enlarged Posterior Bilaterally] : posterior cervical [Cervical Lymph Nodes Enlarged Anterior Bilaterally] : anterior cervical [Supraclavicular Lymph Nodes Enlarged Bilaterally] : supraclavicular [No CVA Tenderness] : no ~M costovertebral angle tenderness [No Spinal Tenderness] : no spinal tenderness [Abnormal Walk] : normal gait [Nail Clubbing] : no clubbing  or cyanosis of the fingernails [Musculoskeletal - Swelling] : no joint swelling seen [Motor Tone] : muscle strength and tone were normal [Skin Color & Pigmentation] : normal skin color and pigmentation [Skin Turgor] : normal skin turgor [Sensation] : the sensory exam was normal to light touch and pinprick [Motor Exam] : the motor exam was normal [No Focal Deficits] : no focal deficits [Oriented To Time, Place, And Person] : oriented to person, place, and time [Impaired Insight] : insight and judgment were intact [Affect] : the affect was normal [FreeTextEntry1] : red facial rash

## 2023-02-16 LAB
B2 GLYCOPROT1 IGA SERPL IA-ACNC: 6.6 SAU
B2 GLYCOPROT1 IGG SER-ACNC: <5 SGU
B2 GLYCOPROT1 IGM SER-ACNC: 5.8 SMU
CARDIOLIPIN IGM SER-MCNC: 10.5 MPL
CARDIOLIPIN IGM SER-MCNC: 6.1 GPL
DSDNA AB SER-ACNC: >1000 IU/ML

## 2023-02-17 LAB — DEPRECATED CARDIOLIPIN IGA SER: <5 APL

## 2023-02-20 LAB
PS IGA SER QL: 1
PS IGG SER QL: 12 UNITS
PS IGM SER QL: 10 UNITS

## 2023-02-23 LAB
PS-PROTHROM CMPLX IGG SERPL IA-ACNC: 14.2 U
PS-PROTHROM CMPLX IGM SERPL IA-ACNC: 10.4 U

## 2023-03-01 ENCOUNTER — APPOINTMENT (OUTPATIENT)
Dept: UROLOGY | Facility: CLINIC | Age: 33
End: 2023-03-01

## 2023-03-02 ENCOUNTER — LABORATORY RESULT (OUTPATIENT)
Age: 33
End: 2023-03-02

## 2023-03-02 ENCOUNTER — APPOINTMENT (OUTPATIENT)
Dept: RHEUMATOLOGY | Facility: CLINIC | Age: 33
End: 2023-03-02
Payer: MEDICAID

## 2023-03-02 VITALS
DIASTOLIC BLOOD PRESSURE: 60 MMHG | WEIGHT: 200 LBS | BODY MASS INDEX: 31.32 KG/M2 | RESPIRATION RATE: 16 BRPM | TEMPERATURE: 97.7 F | SYSTOLIC BLOOD PRESSURE: 99 MMHG | HEART RATE: 81 BPM | OXYGEN SATURATION: 97 %

## 2023-03-02 VITALS
OXYGEN SATURATION: 97 % | HEART RATE: 72 BPM | DIASTOLIC BLOOD PRESSURE: 79 MMHG | SYSTOLIC BLOOD PRESSURE: 120 MMHG | RESPIRATION RATE: 16 BRPM

## 2023-03-02 PROCEDURE — 96413 CHEMO IV INFUSION 1 HR: CPT

## 2023-03-02 RX ORDER — BELIMUMAB 400 MG/5ML
400 INJECTION, POWDER, LYOPHILIZED, FOR SOLUTION INTRAVENOUS
Qty: 1 | Refills: 0 | Status: COMPLETED | OUTPATIENT
Start: 2023-02-24

## 2023-03-02 NOTE — HISTORY OF PRESENT ILLNESS
[6] : 6 [N/A] : N/A [Denies] : Denies [No] : No [Yes] : Yes [Declined] : Declined [Right upper extremity] : Right upper extremity [24g] : 24g [Start Time: ___] : Medication Start Time: [unfilled] [End Time: ___] : Medication End Time: [unfilled] [IV discontinued. Intact. No signs or symptoms of IV complications noted. Time: ___] : IV discontinued. Intact. No signs or symptoms of IV complications noted. Time: [unfilled] [Patient  instructed to seek medical attention with signs and symptoms of adverse effects] : Patient  instructed to seek medical attention with signs and symptoms of adverse effects [Patient left unit in no acute distress] : Patient left unit in no acute distress [Medications administered as ordered and tolerated well.] : Medications administered as ordered and tolerated well. [Blood drawn at time of visit] : Blood drawn at time of visit [de-identified] : hands, knee, feet (toes) [de-identified] : right forearm [de-identified] : Patient presented for Benlysta infusion. Patient reports to have constant pain since 3 weeks ago. Reports to have pain on b/l hands, knees, and toes. Denies to have stiffness and swelling currently. Patient denies of recent infection or abx use. Patient tolerated infusion well. Patient left unit ambulatory in [facility].

## 2023-03-03 LAB
ALBUMIN SERPL ELPH-MCNC: 4 G/DL
ALP BLD-CCNC: 74 U/L
ALT SERPL-CCNC: 22 U/L
ANION GAP SERPL CALC-SCNC: 16 MMOL/L
AST SERPL-CCNC: 32 U/L
BASOPHILS # BLD AUTO: 0.02 K/UL
BASOPHILS NFR BLD AUTO: 0.3 %
BILIRUB SERPL-MCNC: 0.5 MG/DL
BUN SERPL-MCNC: 16 MG/DL
C3 SERPL-MCNC: 68 MG/DL
C4 SERPL-MCNC: 9 MG/DL
CALCIUM SERPL-MCNC: 9.4 MG/DL
CHLORIDE SERPL-SCNC: 101 MMOL/L
CK SERPL-CCNC: 281 U/L
CO2 SERPL-SCNC: 25 MMOL/L
CREAT SERPL-MCNC: 1.2 MG/DL
CREAT SPEC-SCNC: 212 MG/DL
CREAT/PROT UR: 0.7 RATIO
EGFR: 83 ML/MIN/1.73M2
EOSINOPHIL # BLD AUTO: 0.02 K/UL
EOSINOPHIL NFR BLD AUTO: 0.3 %
HCT VFR BLD CALC: 41 %
HGB BLD-MCNC: 12.6 G/DL
IMM GRANULOCYTES NFR BLD AUTO: 0.2 %
LYMPHOCYTES # BLD AUTO: 1.39 K/UL
LYMPHOCYTES NFR BLD AUTO: 24.2 %
MAN DIFF?: NORMAL
MCHC RBC-ENTMCNC: 29.8 PG
MCHC RBC-ENTMCNC: 30.7 GM/DL
MCV RBC AUTO: 96.9 FL
MONOCYTES # BLD AUTO: 0.5 K/UL
MONOCYTES NFR BLD AUTO: 8.7 %
NEUTROPHILS # BLD AUTO: 3.81 K/UL
NEUTROPHILS NFR BLD AUTO: 66.3 %
PLATELET # BLD AUTO: NORMAL K/UL
POTASSIUM SERPL-SCNC: 4.4 MMOL/L
PROT SERPL-MCNC: 6.5 G/DL
PROT UR-MCNC: 140 MG/DL
RBC # BLD: 4.23 M/UL
RBC # FLD: 13.2 %
SODIUM SERPL-SCNC: 142 MMOL/L
WBC # FLD AUTO: 5.75 K/UL

## 2023-03-04 LAB
APPEARANCE: CLEAR
BACTERIA: NEGATIVE
BILIRUBIN URINE: NEGATIVE
BLOOD URINE: NEGATIVE
COLOR: YELLOW
DSDNA AB SER-ACNC: 979 IU/ML
GLUCOSE QUALITATIVE U: NEGATIVE
HYALINE CASTS: 0 /LPF
KETONES URINE: NEGATIVE
LEUKOCYTE ESTERASE URINE: NEGATIVE
MICROSCOPIC-UA: NORMAL
NITRITE URINE: NEGATIVE
PH URINE: 6
PROTEIN URINE: ABNORMAL
RED BLOOD CELLS URINE: 3 /HPF
SPECIFIC GRAVITY URINE: 1.03
SQUAMOUS EPITHELIAL CELLS: 2 /HPF
UROBILINOGEN URINE: NORMAL
WHITE BLOOD CELLS URINE: 6 /HPF

## 2023-03-06 ENCOUNTER — APPOINTMENT (OUTPATIENT)
Dept: UROLOGY | Facility: CLINIC | Age: 33
End: 2023-03-06
Payer: MEDICAID

## 2023-03-06 PROCEDURE — 99213 OFFICE O/P EST LOW 20 MIN: CPT | Mod: 95

## 2023-03-06 NOTE — ASSESSMENT
[FreeTextEntry1] : Very pleasant 32 year old gentleman who presents for follow up of erectile dysfunction\par -Continue Cialis 20 mg as necessary\par -F/U in 6 months

## 2023-03-06 NOTE — HISTORY OF PRESENT ILLNESS
[FreeTextEntry1] : The patient-doctor relationship has been established in a face to face fashion via real time video/audio HIPAA compliant communication using telemedicine software.  He has requested care to be assessed and treated through telemedicine. He understands that there maybe limitations in this process and that he may need further follow up care in the office and/or hospital setting.\par \par Very pleasant 32-year-old gentleman who presents for follow-up of erectile dysfunction.  He feels well.  He reports that Cialis 20 mg significantly improves his erections.  He is happy with his erections on his medication.  He reports no side effects from the medication.  No other complaints. He has not used the medication in ~1 month as he has not been sexually active recently.\par \par He reports that his anxiety and depression have significantly improved.  He reports a correlation with his erections.  At times he does not need to take Cialis at all.

## 2023-03-06 NOTE — PHYSICAL EXAM
[General Appearance - Well Developed] : well developed [General Appearance - Well Nourished] : well nourished [Normal Appearance] : normal appearance [Well Groomed] : well groomed [General Appearance - In No Acute Distress] : no acute distress [Respiration, Rhythm And Depth] : normal respiratory rhythm and effort [] : no respiratory distress [Exaggerated Use Of Accessory Muscles For Inspiration] : no accessory muscle use [Oriented To Time, Place, And Person] : oriented to person, place, and time [Affect] : the affect was normal [Mood] : the mood was normal [Not Anxious] : not anxious

## 2023-03-30 ENCOUNTER — APPOINTMENT (OUTPATIENT)
Dept: RHEUMATOLOGY | Facility: CLINIC | Age: 33
End: 2023-03-30
Payer: MEDICAID

## 2023-03-30 VITALS
DIASTOLIC BLOOD PRESSURE: 71 MMHG | SYSTOLIC BLOOD PRESSURE: 111 MMHG | RESPIRATION RATE: 16 BRPM | OXYGEN SATURATION: 97 % | HEART RATE: 100 BPM

## 2023-03-30 VITALS
TEMPERATURE: 98.6 F | RESPIRATION RATE: 16 BRPM | DIASTOLIC BLOOD PRESSURE: 75 MMHG | HEART RATE: 60 BPM | OXYGEN SATURATION: 96 % | SYSTOLIC BLOOD PRESSURE: 122 MMHG

## 2023-03-30 PROCEDURE — 96413 CHEMO IV INFUSION 1 HR: CPT

## 2023-03-30 RX ORDER — BELIMUMAB 400 MG/5ML
400 INJECTION, POWDER, LYOPHILIZED, FOR SOLUTION INTRAVENOUS
Qty: 1 | Refills: 0 | Status: COMPLETED | OUTPATIENT
Start: 2023-03-24

## 2023-03-30 NOTE — HISTORY OF PRESENT ILLNESS
[6] : 6 [N/A] : N/A [Denies] : Denies [No] : No [Yes] : Yes [Declined] : Declined [Right upper extremity] : Right upper extremity [24g] : 24g [Start Time: ___] : Medication Start Time: [unfilled] [End Time: ___] : Medication End Time: [unfilled] [IV discontinued. Intact. No signs or symptoms of IV complications noted. Time: ___] : IV discontinued. Intact. No signs or symptoms of IV complications noted. Time: [unfilled] [Patient  instructed to seek medical attention with signs and symptoms of adverse effects] : Patient  instructed to seek medical attention with signs and symptoms of adverse effects [Patient left unit in no acute distress] : Patient left unit in no acute distress [Medications administered as ordered and tolerated well.] : Medications administered as ordered and tolerated well. [Blood drawn at time of visit] : Blood drawn at time of visit [de-identified] : hands, knee [de-identified] : right forearm [de-identified] : Patient presents for Benlysta infusion. Reports to have pain as mentioned and rated above. Patient reports to have rashes on left arm, had some on the face as well but subsided. Denies to have stiffness and swelling currently. Patient denies of recent infection or abx use. Patient tolerated infusion well. Patient left unit ambulatory in [facility].

## 2023-04-01 LAB
ALBUMIN SERPL ELPH-MCNC: 3.9 G/DL
ALP BLD-CCNC: 74 U/L
ALT SERPL-CCNC: 18 U/L
ANION GAP SERPL CALC-SCNC: 16 MMOL/L
APPEARANCE: ABNORMAL
AST SERPL-CCNC: 29 U/L
BACTERIA: NEGATIVE
BASOPHILS # BLD AUTO: 0 K/UL
BASOPHILS NFR BLD AUTO: 0 %
BILIRUB SERPL-MCNC: 0.4 MG/DL
BILIRUBIN URINE: NEGATIVE
BLOOD URINE: NEGATIVE
BUN SERPL-MCNC: 15 MG/DL
C3 SERPL-MCNC: 62 MG/DL
C4 SERPL-MCNC: 12 MG/DL
CALCIUM SERPL-MCNC: 9.2 MG/DL
CHLORIDE SERPL-SCNC: 102 MMOL/L
CK SERPL-CCNC: 325 U/L
CO2 SERPL-SCNC: 23 MMOL/L
COLOR: NORMAL
CREAT SERPL-MCNC: 1.22 MG/DL
CREAT SPEC-SCNC: 219 MG/DL
CREAT/PROT UR: 0.6 RATIO
EGFR: 81 ML/MIN/1.73M2
EOSINOPHIL # BLD AUTO: 0.02 K/UL
EOSINOPHIL NFR BLD AUTO: 0.4 %
GLUCOSE QUALITATIVE U: NEGATIVE
HCT VFR BLD CALC: 38.9 %
HGB BLD-MCNC: 12.2 G/DL
HYALINE CASTS: 0 /LPF
IMM GRANULOCYTES NFR BLD AUTO: 0.4 %
KETONES URINE: NEGATIVE
LEUKOCYTE ESTERASE URINE: NEGATIVE
LYMPHOCYTES # BLD AUTO: 1.04 K/UL
LYMPHOCYTES NFR BLD AUTO: 19.3 %
MAN DIFF?: NORMAL
MCHC RBC-ENTMCNC: 29.3 PG
MCHC RBC-ENTMCNC: 31.4 GM/DL
MCV RBC AUTO: 93.5 FL
MICROSCOPIC-UA: NORMAL
MONOCYTES # BLD AUTO: 0.42 K/UL
MONOCYTES NFR BLD AUTO: 7.8 %
NEUTROPHILS # BLD AUTO: 3.88 K/UL
NEUTROPHILS NFR BLD AUTO: 72.1 %
NITRITE URINE: NEGATIVE
PH URINE: 6
PLATELET # BLD AUTO: NORMAL K/UL
POTASSIUM SERPL-SCNC: 4.5 MMOL/L
PROT SERPL-MCNC: 6.7 G/DL
PROT UR-MCNC: 131 MG/DL
PROTEIN URINE: ABNORMAL
RBC # BLD: 4.16 M/UL
RBC # FLD: 13.2 %
RED BLOOD CELLS URINE: 2 /HPF
SODIUM SERPL-SCNC: 141 MMOL/L
SPECIFIC GRAVITY URINE: 1.03
SQUAMOUS EPITHELIAL CELLS: 7 /HPF
UROBILINOGEN URINE: NORMAL
WBC # FLD AUTO: 5.38 K/UL
WHITE BLOOD CELLS URINE: 6 /HPF

## 2023-04-03 NOTE — ED ADULT TRIAGE NOTE - MEANS OF ARRIVAL
ambulatory
Implemented All Universal Safety Interventions:  [place] to call system. Call bell, personal items and telephone within reach. Instruct patient to call for assistance. Room bathroom lighting operational. Non-slip footwear when patient is off stretcher. Physically safe environment: no spills, clutter or unnecessary equipment. Stretcher in lowest position, wheels locked, appropriate side rails in place.

## 2023-04-04 ENCOUNTER — APPOINTMENT (OUTPATIENT)
Dept: HEART AND VASCULAR | Facility: CLINIC | Age: 33
End: 2023-04-04

## 2023-04-04 ENCOUNTER — RX RENEWAL (OUTPATIENT)
Age: 33
End: 2023-04-04

## 2023-04-04 LAB — DSDNA AB SER-ACNC: >1000 IU/ML

## 2023-04-15 ENCOUNTER — RX RENEWAL (OUTPATIENT)
Age: 33
End: 2023-04-15

## 2023-04-25 ENCOUNTER — APPOINTMENT (OUTPATIENT)
Dept: HEART AND VASCULAR | Facility: CLINIC | Age: 33
End: 2023-04-25

## 2023-04-27 ENCOUNTER — APPOINTMENT (OUTPATIENT)
Dept: RHEUMATOLOGY | Facility: CLINIC | Age: 33
End: 2023-04-27

## 2023-05-01 ENCOUNTER — APPOINTMENT (OUTPATIENT)
Dept: RHEUMATOLOGY | Facility: CLINIC | Age: 33
End: 2023-05-01
Payer: MEDICAID

## 2023-05-01 ENCOUNTER — RX RENEWAL (OUTPATIENT)
Age: 33
End: 2023-05-01

## 2023-05-01 VITALS
RESPIRATION RATE: 16 BRPM | HEART RATE: 73 BPM | DIASTOLIC BLOOD PRESSURE: 69 MMHG | SYSTOLIC BLOOD PRESSURE: 111 MMHG | TEMPERATURE: 98.4 F | OXYGEN SATURATION: 97 %

## 2023-05-01 PROCEDURE — 96413 CHEMO IV INFUSION 1 HR: CPT

## 2023-05-01 RX ORDER — BELIMUMAB 400 MG/5ML
400 INJECTION, POWDER, LYOPHILIZED, FOR SOLUTION INTRAVENOUS
Qty: 1 | Refills: 0 | Status: COMPLETED | OUTPATIENT
Start: 2023-04-21

## 2023-05-01 NOTE — REASON FOR VISIT
Excision of left labial mass 4cm
[Patient presents for infusion] : infusion therapy as documented below
[Patient presents for infusion] : infusion therapy as documented below

## 2023-05-01 NOTE — HISTORY OF PRESENT ILLNESS
[6] : 6 [N/A] : N/A [Denies] : Denies [No] : No [Yes] : Yes [Declined] : Declined [de-identified] : hands, knee [de-identified] : Facial [Left upper extremity] : Left upper extremity [24g] : 24g [Start Time: ___] : Medication Start Time: [unfilled] [End Time: ___] : Medication End Time: [unfilled] [IV discontinued. Intact. No signs or symptoms of IV complications noted. Time: ___] : IV discontinued. Intact. No signs or symptoms of IV complications noted. Time: [unfilled] [Patient  instructed to seek medical attention with signs and symptoms of adverse effects] : Patient  instructed to seek medical attention with signs and symptoms of adverse effects [Patient left unit in no acute distress] : Patient left unit in no acute distress [Medications administered as ordered and tolerated well.] : Medications administered as ordered and tolerated well. [Blood drawn at time of visit] : Blood drawn at time of visit [de-identified] : Left forearm [de-identified] : Blood work with every infusion: Anti-ds-DNA, C3, C4, CMP, CBC w/ diff, UA w/ microscopic, and Spot Pr/Cr.  [de-identified] : Patient presents for Benlysta infusion. Reports to have pain as mentioned and rated above. Patient reports to have rashes on left arm, had some on the face as well. Denies to have stiffness and swelling currently. Patient denies of recent infection or abx use. Patient tolerated infusion well. Patient left unit ambulatory in [facility].

## 2023-05-01 NOTE — HISTORY OF PRESENT ILLNESS
[6] : 6 [N/A] : N/A [Denies] : Denies [No] : No [Yes] : Yes [Declined] : Declined [de-identified] : hands, knee [de-identified] : Facial [Left upper extremity] : Left upper extremity [24g] : 24g [Start Time: ___] : Medication Start Time: [unfilled] [End Time: ___] : Medication End Time: [unfilled] [IV discontinued. Intact. No signs or symptoms of IV complications noted. Time: ___] : IV discontinued. Intact. No signs or symptoms of IV complications noted. Time: [unfilled] [Patient  instructed to seek medical attention with signs and symptoms of adverse effects] : Patient  instructed to seek medical attention with signs and symptoms of adverse effects [Patient left unit in no acute distress] : Patient left unit in no acute distress [Medications administered as ordered and tolerated well.] : Medications administered as ordered and tolerated well. [Blood drawn at time of visit] : Blood drawn at time of visit [de-identified] : Left forearm [de-identified] : Blood work with every infusion: Anti-ds-DNA, C3, C4, CMP, CBC w/ diff, UA w/ microscopic, and Spot Pr/Cr.  [de-identified] : Patient presents for Benlysta infusion. Reports to have pain as mentioned and rated above. Patient reports to have rashes on left arm, had some on the face as well. Denies to have stiffness and swelling currently. Patient denies of recent infection or abx use. Patient tolerated infusion well. Patient left unit ambulatory in [facility].

## 2023-05-02 LAB
APPEARANCE: CLEAR
BACTERIA: NEGATIVE /HPF
BASOPHILS # BLD AUTO: 0.01 K/UL
BASOPHILS NFR BLD AUTO: 0.2 %
BILIRUBIN URINE: NEGATIVE
BLOOD URINE: ABNORMAL
CAST: 2 /LPF
COLOR: YELLOW
EOSINOPHIL # BLD AUTO: 0.03 K/UL
EOSINOPHIL NFR BLD AUTO: 0.6 %
EPITHELIAL CELLS: 0 /HPF
GLUCOSE QUALITATIVE U: NEGATIVE MG/DL
HCT VFR BLD CALC: 39.5 %
HGB BLD-MCNC: 12 G/DL
IMM GRANULOCYTES NFR BLD AUTO: 0.2 %
KETONES URINE: NEGATIVE MG/DL
LEUKOCYTE ESTERASE URINE: NEGATIVE
LYMPHOCYTES # BLD AUTO: 1.04 K/UL
LYMPHOCYTES NFR BLD AUTO: 21.1 %
MAN DIFF?: NORMAL
MCHC RBC-ENTMCNC: 29.1 PG
MCHC RBC-ENTMCNC: 30.4 GM/DL
MCV RBC AUTO: 95.6 FL
MICROSCOPIC-UA: NORMAL
MONOCYTES # BLD AUTO: 0.48 K/UL
MONOCYTES NFR BLD AUTO: 9.8 %
NEUTROPHILS # BLD AUTO: 3.35 K/UL
NEUTROPHILS NFR BLD AUTO: 68.1 %
NITRITE URINE: NEGATIVE
PH URINE: 5.5
PLATELET # BLD AUTO: 175 K/UL
PROTEIN URINE: 100 MG/DL
RBC # BLD: 4.13 M/UL
RBC # FLD: 13.8 %
RED BLOOD CELLS URINE: 0 /HPF
SPECIFIC GRAVITY URINE: 1.01
UROBILINOGEN URINE: 0.2 MG/DL
WBC # FLD AUTO: 4.92 K/UL
WHITE BLOOD CELLS URINE: 2 /HPF

## 2023-05-03 LAB
ALBUMIN SERPL ELPH-MCNC: 4.2 G/DL
ALP BLD-CCNC: 68 U/L
ALT SERPL-CCNC: 31 U/L
ANION GAP SERPL CALC-SCNC: 15 MMOL/L
AST SERPL-CCNC: 61 U/L
BILIRUB SERPL-MCNC: 0.3 MG/DL
BUN SERPL-MCNC: 12 MG/DL
C3 SERPL-MCNC: 62 MG/DL
C4 SERPL-MCNC: 12 MG/DL
CALCIUM SERPL-MCNC: 9.2 MG/DL
CHLORIDE SERPL-SCNC: 103 MMOL/L
CK SERPL-CCNC: 2883 U/L
CO2 SERPL-SCNC: 22 MMOL/L
CREAT SERPL-MCNC: 1.14 MG/DL
CREAT SPEC-SCNC: 116 MG/DL
CREAT/PROT UR: 0.5 RATIO
DSDNA AB SER-ACNC: 651 IU/ML
EGFR: 88 ML/MIN/1.73M2
POTASSIUM SERPL-SCNC: 4.2 MMOL/L
PROT SERPL-MCNC: 6.5 G/DL
PROT UR-MCNC: 60 MG/DL
SODIUM SERPL-SCNC: 139 MMOL/L

## 2023-05-03 RX ORDER — BELIMUMAB 400 MG/5ML
400 INJECTION, POWDER, LYOPHILIZED, FOR SOLUTION INTRAVENOUS
Qty: 1 | Refills: 0 | Status: COMPLETED | OUTPATIENT
Start: 2022-05-31 | End: 1900-01-01

## 2023-05-09 ENCOUNTER — APPOINTMENT (OUTPATIENT)
Dept: RHEUMATOLOGY | Facility: CLINIC | Age: 33
End: 2023-05-09
Payer: MEDICAID

## 2023-05-09 VITALS
RESPIRATION RATE: 16 BRPM | HEIGHT: 67 IN | DIASTOLIC BLOOD PRESSURE: 70 MMHG | SYSTOLIC BLOOD PRESSURE: 120 MMHG | TEMPERATURE: 97.1 F | BODY MASS INDEX: 30.61 KG/M2 | HEART RATE: 82 BPM | OXYGEN SATURATION: 98 % | WEIGHT: 195 LBS

## 2023-05-09 PROCEDURE — 99215 OFFICE O/P EST HI 40 MIN: CPT

## 2023-05-09 NOTE — HISTORY OF PRESENT ILLNESS
[FreeTextEntry1] : 1.  SLE: In 2007 he developed fatigue and chest pain for which he was admitted to [facility].  The details are lacking, but he was diagnosed with pericarditis and myocarditis.  At that time a diagnosis of SLE was made, and he was treated with prednisone and enalapril.  He subsequently developed a butterfly rash, polyarthritis, and Raynauds.  In 2013 he had a DVT of the right leg complicated by a PE.  His disease stabilized, but in April 2016 he flared with marked alopecia, fatigue, and arthritis.  By the summer 2016 his disease was under control.  While serologically active, he had little in the way of active clinical disease until the fall 2016.  He had two admissions to [facility] for fever and cough.  The initial diagnosis was that of a viral infection.  He was discharged but readmitted once again for fever and cough. After an extensive evaluation, which failed to identify an infectious cause, the prednisone was increased to 20 mg/d.  CT scan of the chest failed to demonstrate parenchymal disease.  Since going home he has continued with low grade fever and cough.  No localizing symptoms.  He also had myalgias and arthralgias, profound fatigue, "itchy" hands.  Several changes to medications were made, chief of which was to change to methylprednisolone and increase the dose.  He felt much better with near disappearance of cough, a return of energy, disappearance of fever.  However, he had joint pain and swelling in the hands as well as pain in shoulders, ankles, and knees. He in early Dec 2016 developed cutaneous vasculitis of the legs as well as periungual infarcts. A kidney biopsy was performed because of rising proteinuria (see below).  He enrolled in the BLISS-LN study and did very well.  After the completion of the study, he was maintained on commercial Benlysta and did well.  However, in early 2023 following a sinus infection, his disease seemed to flare with rash, arthritis, increased serologies, and increased proteinuria.  He had missed one dose of belimumab. The MMF dose was increased to 3 g/d.  With the increase he felt a little better with reduced arthritic pain; the rash waxed and waned.  He developed some alopecia.  Although the proteinuria went down, he was still serologically active in May 2023, and I therefore discussed other therapeutic options (dior, CAR-T).  \par 2.  Myocarditis: Cardiac function improved and was normal or close to it after the start of Benlysta. \par 3.  Pericarditis\par 4.  DVT/PE: chronically anticoagulated\par 5.  Vit D deficiency\par 6.  Cough:  cause unknown:  consider prolonged viral infection, lisinopril (stopped Nov. 21, 2016), SLE (though CT scan of lung in [facility] did not show parenchymal disease), allergic reaction (cats at home, drugs). Cough disappeared on higher doses of steroids (see above). \par 7.  Low DLco:  PFT's done in late 2016 demonstrated a DLco of 40% predicted.  \par 8.  Lupus Nephritis: proteinuria raised the question of kidney biopsy.  Proteinuria has been reproducible, and the need for a kidney biopsy was expressed to the patient.  Anticoagulation in the tania-procedure period discussed. \par 9.  Vasculitis:  s/p skin biopsy performed to confirm the diagnosis of vasculitis.  \par 10.  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  The official report also noted crescents in 2/21 glomeruli.  Various therapeutic options were discussed (higher dose MMF, Cytoxan, study). The decision was to maximize the MMF and screen for the belimumab study.  He  received two 500 mg doses of Solumedrol as well. He enrolled in the BLISS-LN study and did very well.  After the completion of the study, he was maintained on commercial Benlysta and did well.  However, in early 2023 following a sinus infection, his disease seemed to flare with rash, arthritis, increased serologies, and increased proteinuria.  He had missed one dose of belimumab.  With the increase he felt a little better with reduced arthritic pain; the rash waxed and waned.  He developed some alopecia.  Although the proteinuria went down, he was still serologically active in May 2023, and I therefore discussed other therapeutic options (dior, CAR-T).  \par 11. Osteonecrosis: onset in hips but also in shoulders/knees.  He was seen at [facility], and according to the patient there was a difference of opinion between two orthopedists about doing a core decompression. Meanwhile, he enrolled in a study of AVN being conducted at [facility]. Surgery is in his future. \par 12. Fatigue\par 13. Myopathy: CK in the 2,000's in the spring 2023 most assuredly from an intense exercise program. \par \par Meds\par lisinopril 10 mg/d  \par metoprolol 200 mg/d\par Plaquenil 400 mg/d\par CellCept 3000 mg/d in divided doses\par Coumadin 7.5 mg/d\par Ca\par Vit D\par Vit C\par Effexor 75 mg/d\par \par Vaccines\par Prevnar 13 8/8/16  ([id number] exp 8/17)\par PNVX 23 11/13/17\par PNVX 23 2/14/23 ([id number]; exp 9/30/23)\par Fluzone 9/15/16 ([place] 5629 KA; exp [id number])

## 2023-05-09 NOTE — ASSESSMENT
[FreeTextEntry1] : 1.  SLE: In 2007 he developed fatigue and chest pain for which he was admitted to [facility].  The details are lacking, but he was diagnosed with pericarditis and myocarditis.  At that time a diagnosis of SLE was made, and he was treated with prednisone and enalapril.  He subsequently developed a butterfly rash, polyarthritis, and Raynauds.  In 2013 he had a DVT of the right leg complicated by a PE.  His disease stabilized, but in April 2016 he flared with marked alopecia, fatigue, and arthritis.  By the summer 2016 his disease was under control.  While serologically active, he had little in the way of active clinical disease until the fall 2016.  He had two admissions to [facility] for fever and cough.  The initial diagnosis was that of a viral infection.  He was discharged but readmitted once again for fever and cough. After an extensive evaluation, which failed to identify an infectious cause, the prednisone was increased to 20 mg/d.  CT scan of the chest failed to demonstrate parenchymal disease.  Since going home he has continued with low grade fever and cough.  No localizing symptoms.  He also had myalgias and arthralgias, profound fatigue, "itchy" hands.  Several changes to medications were made, chief of which was to change to methylprednisolone and increase the dose.  He felt much better with near disappearance of cough, a return of energy, disappearance of fever.  However, he had joint pain and swelling in the hands as well as pain in shoulders, ankles, and knees. He in early Dec 2016 developed cutaneous vasculitis of the legs as well as periungual infarcts. A kidney biopsy was performed because of rising proteinuria (see below).  He enrolled in the BLISS-LN study and did very well.  After the completion of the study, he was maintained on commercial Benlysta and did well.  However, in early 2023 following a sinus infection, his disease seemed to flare with rash, arthritis, increased serologies, and increased proteinuria.  He had missed one dose of belimumab. The MMF dose was increased to 3 g/d.  With the increase he felt a little better with reduced arthritic pain; the rash waxed and waned.  He developed some alopecia.  Although the proteinuria went down, he was still serologically active in May 2023, and I therefore discussed other therapeutic options (dior, CAR-T).  \par 2.  Myocarditis: Cardiac function improved and was normal or close to it after the start of Benlysta. \par 3.  Pericarditis\par 4.  DVT/PE: chronically anticoagulated\par 5.  Vit D deficiency\par 6.  Cough:  cause unknown:  consider prolonged viral infection, lisinopril (stopped Nov. 21, 2016), SLE (though CT scan of lung in [facility] did not show parenchymal disease), allergic reaction (cats at home, drugs). Cough disappeared on higher doses of steroids (see above). \par 7.  Low DLco:  PFT's done in late 2016 demonstrated a DLco of 40% predicted.  \par 8.  Lupus Nephritis: proteinuria raised the question of kidney biopsy.  Proteinuria has been reproducible, and the need for a kidney biopsy was expressed to the patient.  Anticoagulation in the tania-procedure period discussed. \par 9.  Vasculitis:  s/p skin biopsy performed to confirm the diagnosis of vasculitis.  \par 10.  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  The official report also noted crescents in 2/21 glomeruli.  Various therapeutic options were discussed (higher dose MMF, Cytoxan, study). The decision was to maximize the MMF and screen for the belimumab study.  He  received two 500 mg doses of Solumedrol as well. He enrolled in the BLISS-LN study and did very well.  After the completion of the study, he was maintained on commercial Benlysta and did well.  However, in early 2023 following a sinus infection, his disease seemed to flare with rash, arthritis, increased serologies, and increased proteinuria.  He had missed one dose of belimumab.  With the increase he felt a little better with reduced arthritic pain; the rash waxed and waned.  He developed some alopecia.  Although the proteinuria went down, he was still serologically active in May 2023, and I therefore discussed other therapeutic options (dior, CAR-T).  \par 11. Osteonecrosis: onset in hips but also in shoulders/knees.  He was seen at [facility] [place], and according to the patient there was a difference of opinion between two orthopedists about doing a core decompression. Meanwhile, he enrolled in a study of AVN being conducted at [facility] [place]. Surgery is in his future. \par 12. Fatigue\par 13. Myopathy: CK in the 2,000's in the spring 2023 most assuredly from an intense exercise program. \par Plan:\par 1.  Lab tests\par 2.  Return 4 weeks\par 3.  Continue MMF 1.5 g 2xd\par 4.  Ortho f/u\par 5.  Shingrix vaccine recommended\par 6.  Consider different therapy (dior, ? CAR-T)\par 7.  High risk medications used in the treatment of rheumatic diseases include steroids, disease modifying agents, immunosuppressive therapies, antimalarials, biologics, and chemotherapy. Regardless of which drug or class of drug, the potential toxicities of these therapies mandate close monitoring in the form of a history, physical, and laboratory tests.\par 8.  Systemic Lupus Erythematosus, known as lupus, is a chronic autoimmune disease that can affect any organ in the body posing threats to proper organ function and even to life. Therefore, close surveillance of all bodily functions is required, including but not limited to central and peripheral nervous system, ocular and auditory systems, cardiopulmonary function, kidney function, mucocutaneous and musculoskeletal systems as well as constitutional manifestations. Surveillance consists of history, physical, and laboratory tests. Treatment varies, but most of the drugs used are high risk and therefore also require close monitoring in the form of blood and urine tests.\par 9.  Check CK after abstaining from exercise\par

## 2023-05-09 NOTE — PHYSICAL EXAM
[General Appearance - Alert] : alert [General Appearance - In No Acute Distress] : in no acute distress [General Appearance - Well Nourished] : well nourished [Sclera] : the sclera and conjunctiva were normal [Extraocular Movements] : extraocular movements were intact [Strabismus] : no strabismus was seen [Outer Ear] : the ears and nose were normal in appearance [Nasal Cavity] : the nasal mucosa and septum were normal [Oropharynx] : the oropharynx was normal [Neck Appearance] : the appearance of the neck was normal [Neck Cervical Mass (___cm)] : no neck mass was observed [Thyroid Diffuse Enlargement] : the thyroid was not enlarged [Jugular Venous Distention Increased] : there was no jugular-venous distention [Auscultation Breath Sounds / Voice Sounds] : lungs were clear to auscultation bilaterally [Heart Rate And Rhythm] : heart rate was normal and rhythm regular [Heart Sounds] : normal S1 and S2 [Heart Sounds Gallop] : no gallops [Murmurs] : no murmurs [Heart Sounds Pericardial Friction Rub] : no pericardial rub [Arterial Pulses Carotid] : carotid pulses were normal with no bruits [Full Pulse] : the pedal pulses are present [Bowel Sounds] : normal bowel sounds [Edema] : there was no peripheral edema [Abdomen Soft] : soft [Abdomen Tenderness] : non-tender [] : no hepato-splenomegaly [Abdomen Mass (___ Cm)] : no abdominal mass palpated [Cervical Lymph Nodes Enlarged Posterior Bilaterally] : posterior cervical [Cervical Lymph Nodes Enlarged Anterior Bilaterally] : anterior cervical [Supraclavicular Lymph Nodes Enlarged Bilaterally] : supraclavicular [No Spinal Tenderness] : no spinal tenderness [No CVA Tenderness] : no ~M costovertebral angle tenderness [Abnormal Walk] : normal gait [Nail Clubbing] : no clubbing  or cyanosis of the fingernails [Motor Tone] : muscle strength and tone were normal [Musculoskeletal - Swelling] : no joint swelling seen [Skin Color & Pigmentation] : normal skin color and pigmentation [FreeTextEntry1] : red facial rash [Skin Turgor] : normal skin turgor [Sensation] : the sensory exam was normal to light touch and pinprick [Motor Exam] : the motor exam was normal [No Focal Deficits] : no focal deficits [Oriented To Time, Place, And Person] : oriented to person, place, and time [Impaired Insight] : insight and judgment were intact [Affect] : the affect was normal

## 2023-05-10 LAB — CK SERPL-CCNC: 7780 U/L

## 2023-05-17 ENCOUNTER — EMERGENCY (EMERGENCY)
Facility: HOSPITAL | Age: 33
LOS: 1 days | Discharge: HOME | End: 2023-05-17
Attending: EMERGENCY MEDICINE
Payer: MEDICAID

## 2023-05-17 VITALS
SYSTOLIC BLOOD PRESSURE: 118 MMHG | RESPIRATION RATE: 18 BRPM | WEIGHT: 190.04 LBS | HEIGHT: 68 IN | TEMPERATURE: 98 F | HEART RATE: 78 BPM | DIASTOLIC BLOOD PRESSURE: 70 MMHG | OXYGEN SATURATION: 97 %

## 2023-05-17 VITALS
TEMPERATURE: 98 F | OXYGEN SATURATION: 99 % | DIASTOLIC BLOOD PRESSURE: 77 MMHG | HEART RATE: 62 BPM | SYSTOLIC BLOOD PRESSURE: 102 MMHG | RESPIRATION RATE: 18 BRPM

## 2023-05-17 PROCEDURE — 99284 EMERGENCY DEPT VISIT MOD MDM: CPT

## 2023-05-17 RX ORDER — OXYCODONE HYDROCHLORIDE 5 MG/1
1 TABLET ORAL
Qty: 12 | Refills: 0
Start: 2023-05-17 | End: 2023-05-21

## 2023-05-17 NOTE — ED ADULT NURSE NOTE - NSFALLLASTSIX_ED_ALL_ED
Venipuncture Paragraph: An alcohol pad was applied to the venipuncture site. Venipuncture was performed using a butterfly needle. Pressure and a bandaid was applied to the site. No complications were noted. Detail Level: Detailed No. Bill For Individual Tests Below?: no Number Of Tubes Drawn: 3

## 2023-05-17 NOTE — ED ADULT NURSE NOTE - OBJECTIVE STATEMENT
Patient c/o dental pain on right side of mouth since Sunday. Patient is waiting for dental implants but needs to be cleared by hematologist prior to procedure. Patient has been taking tylenol for pain but with no relief. Denies n/v, fever, chills, sob. Patient A&Ox4, reports pain 8/10. ID band and safety precautions in place. Plan of care in progress.

## 2023-05-17 NOTE — ED ADULT NURSE NOTE - NSFALLUNIVINTERV_ED_ALL_ED
Bed/Stretcher in lowest position, wheels locked, appropriate side rails in place/Call bell, personal items and telephone in reach/Instruct patient to call for assistance before getting out of bed/chair/stretcher/Non-slip footwear applied when patient is off stretcher/[place] to call system/Physically safe environment - no spills, clutter or unnecessary equipment/Purposeful proactive rounding/Room/bathroom lighting operational, light cord in reach

## 2023-05-17 NOTE — ED PROVIDER NOTE - OBJECTIVE STATEMENT
32-year-old male with past medical history of SLE C/B lupus nephritis on Coumadin presenting with dental pain.  Patient reports that he has several fractured teeth in the right upper.  Currently seeing a dentist and oral surgeon and planning to have dental implants placed within the next few weeks.  Patient awaiting clearance from hematology for implants to be placed.  Over the past few days patient has endorsed worsening right upper dental pain.  Taking Tylenol at home without improvement of symptoms.  Patient otherwise denies swelling, fevers, chills, chest pain, shortness of breath.

## 2023-05-17 NOTE — ED PROVIDER NOTE - CLINICAL SUMMARY MEDICAL DECISION MAKING FREE TEXT BOX
There is a difficulty in the situation as the patient is currently anticoagulated and as such the risk-benefit ratio of a dental block favors not doing so.  The patient is already taking Tylenol and he still having significant pain.  Given that he is on Coumadin it is unlikely that the dentist is going to be able to do any procedures.  I reviewed the old charts and the patient has not come for such complaint in the past and he is on multiple rheumatology visits he follows with a hematologist not with [facility].  She has an appointment on Monday with the hematologist who he thinks we will switch him to Lovenox and that he should be able to get an appointment with a dentist the following day.  I talked to the patient and I advised him that the best course of action would be to talk to the hematologist by telephone and see if that doctor would be able to switch to Lovenox by telephone or not require waiting till Monday appointment.  He understands and will try to do so.  Continue to take Tylenol and for bit breakthrough pain I prescribed him 3-day course of oxycodone.  We discussed the risks and the use of opiate pain medication and prevention of harms.He has no active addictive concerns -Vinh Glass MD-

## 2023-05-17 NOTE — ED PROVIDER NOTE - PATIENT PORTAL LINK FT
You can access the FollowMyHealth Patient Portal offered by [facility] by registering at the following website: [email or web address]. By joining [facility]’s FollowMyHealth portal, you will also be able to view your health information using other applications (apps) compatible with our system.

## 2023-05-17 NOTE — ED PROVIDER NOTE - PHYSICAL EXAMINATION
Dentition with multiple fractures in various ages none of which appear to be bleeding.  There is no intraoral or facial swelling.  He does indeed have a malar rash.  There is no obviously loose teeth.  Patient appears well and is in no distress.  Airway is clear.

## 2023-05-17 NOTE — ED PROVIDER NOTE - NSFOLLOWUPINSTRUCTIONS_ED_ALL_ED_FT
IMPORTANT INSTRUCTIONS FROM Dr. HAIR:    Please follow up with your personal medical doctor in 24-48 hours. See your hematologist as soon as possible to get clearance for the dentist. Try to move forward your appointment.  Bring results from today to your visit..    Take tylenol every 6 hours for pain.  You can also take oxycodone additionally if this is not enough.     If you were advised to take any medications - be sure to review the package insert.    If your symptoms change, get worse or if you have any new symptoms, come to the ER right away.  If you have any questions, call the ER at the phone number on this page.

## 2023-05-19 ENCOUNTER — NON-APPOINTMENT (OUTPATIENT)
Age: 33
End: 2023-05-19

## 2023-05-24 RX ORDER — BELIMUMAB 400 MG/5ML
400 INJECTION, POWDER, LYOPHILIZED, FOR SOLUTION INTRAVENOUS
Qty: 800 | Refills: 12 | Status: ACTIVE | COMMUNITY
Start: 2020-01-31

## 2023-05-31 ENCOUNTER — APPOINTMENT (OUTPATIENT)
Dept: RHEUMATOLOGY | Facility: CLINIC | Age: 33
End: 2023-05-31

## 2023-06-02 ENCOUNTER — APPOINTMENT (OUTPATIENT)
Dept: RHEUMATOLOGY | Facility: CLINIC | Age: 33
End: 2023-06-02
Payer: MEDICAID

## 2023-06-02 VITALS — DIASTOLIC BLOOD PRESSURE: 66 MMHG | OXYGEN SATURATION: 98 % | SYSTOLIC BLOOD PRESSURE: 105 MMHG | HEART RATE: 73 BPM

## 2023-06-02 VITALS
TEMPERATURE: 98.3 F | SYSTOLIC BLOOD PRESSURE: 104 MMHG | RESPIRATION RATE: 16 BRPM | HEART RATE: 77 BPM | DIASTOLIC BLOOD PRESSURE: 69 MMHG | OXYGEN SATURATION: 99 %

## 2023-06-02 PROCEDURE — 96413 CHEMO IV INFUSION 1 HR: CPT

## 2023-06-02 RX ORDER — BELIMUMAB 400 MG/5ML
400 INJECTION, POWDER, LYOPHILIZED, FOR SOLUTION INTRAVENOUS
Qty: 1 | Refills: 0 | Status: COMPLETED | OUTPATIENT
Start: 2023-05-19

## 2023-06-02 NOTE — HISTORY OF PRESENT ILLNESS
[6] : 6 [N/A] : N/A [Denies] : Denies [No] : No [Yes] : Yes [24g] : 24g [Start Time: ___] : Medication Start Time: [unfilled] [End Time: ___] : Medication End Time: [unfilled] [IV discontinued. Intact. No signs or symptoms of IV complications noted. Time: ___] : IV discontinued. Intact. No signs or symptoms of IV complications noted. Time: [unfilled] [Patient  instructed to seek medical attention with signs and symptoms of adverse effects] : Patient  instructed to seek medical attention with signs and symptoms of adverse effects [Patient left unit in no acute distress] : Patient left unit in no acute distress [Medications administered as ordered and tolerated well.] : Medications administered as ordered and tolerated well. [Blood drawn at time of visit] : Blood drawn at time of visit [de-identified] : hands and knees [de-identified] : face [de-identified] : left arm cephalic vein [de-identified] : Patient presents for Benlysta infusion. Reports to have pain as mentioned and rated above. Patient reports to have rashes on face. Reports to have joint stiffness and denies having swelling. Patient admits to having tooth extraction 2 weeks ago, treated with completed abx. Otherwise, no other symptoms or concerns verbalized. Patient tolerated infusion well. Patient left unit ambulatory in [facility].

## 2023-06-04 LAB
ALBUMIN SERPL ELPH-MCNC: 4.2 G/DL
ALP BLD-CCNC: 62 U/L
ALT SERPL-CCNC: 18 U/L
ANION GAP SERPL CALC-SCNC: 19 MMOL/L
APPEARANCE: CLEAR
AST SERPL-CCNC: 28 U/L
BACTERIA: NEGATIVE /HPF
BILIRUB SERPL-MCNC: 0.3 MG/DL
BILIRUBIN URINE: NEGATIVE
BLOOD URINE: NEGATIVE
BUN SERPL-MCNC: 14 MG/DL
C3 SERPL-MCNC: 67 MG/DL
C4 SERPL-MCNC: 12 MG/DL
CALCIUM SERPL-MCNC: 9.2 MG/DL
CAST: 0 /LPF
CHLORIDE SERPL-SCNC: 100 MMOL/L
CK SERPL-CCNC: 300 U/L
CO2 SERPL-SCNC: 22 MMOL/L
COLOR: YELLOW
CREAT SERPL-MCNC: 1.01 MG/DL
CREAT SPEC-SCNC: 104 MG/DL
CREAT/PROT UR: 0.2 RATIO
DSDNA AB SER-ACNC: 397 IU/ML
EGFR: 102 ML/MIN/1.73M2
EPITHELIAL CELLS: 0 /HPF
GLUCOSE QUALITATIVE U: NEGATIVE MG/DL
KETONES URINE: NEGATIVE MG/DL
LEUKOCYTE ESTERASE URINE: NEGATIVE
MICROSCOPIC-UA: NORMAL
NITRITE URINE: NEGATIVE
PH URINE: 5.5
POTASSIUM SERPL-SCNC: 4.3 MMOL/L
PROT SERPL-MCNC: 6.4 G/DL
PROT UR-MCNC: 21 MG/DL
PROTEIN URINE: 30 MG/DL
RED BLOOD CELLS URINE: 0 /HPF
SODIUM SERPL-SCNC: 141 MMOL/L
SPECIFIC GRAVITY URINE: 1.01
UROBILINOGEN URINE: 0.2 MG/DL
WHITE BLOOD CELLS URINE: 1 /HPF

## 2023-06-05 ENCOUNTER — NON-APPOINTMENT (OUTPATIENT)
Age: 33
End: 2023-06-05

## 2023-06-05 ENCOUNTER — APPOINTMENT (OUTPATIENT)
Dept: HEART AND VASCULAR | Facility: CLINIC | Age: 33
End: 2023-06-05
Payer: MEDICAID

## 2023-06-05 VITALS
DIASTOLIC BLOOD PRESSURE: 70 MMHG | BODY MASS INDEX: 29.98 KG/M2 | TEMPERATURE: 98.2 F | OXYGEN SATURATION: 98 % | WEIGHT: 191 LBS | HEART RATE: 80 BPM | HEIGHT: 67 IN | SYSTOLIC BLOOD PRESSURE: 100 MMHG

## 2023-06-05 PROCEDURE — 99214 OFFICE O/P EST MOD 30 MIN: CPT

## 2023-06-05 NOTE — DISCUSSION/SUMMARY
[FreeTextEntry1] : # HF with recovered ejection fraction\par - his neurohormonal regimen at this time is metoprolol succinate 200 mg daily and lisinopril 10 mg daily. will continue at this time. no clear indication to switch ACEi to ARNI or add MRA/SGLT2i given recovered LVEF \par - will continue with TTE every 1-2 years, last 11/2022\par - Labs 6/2\par \par # SLE\par - he is on Plaquenil which can cause cardiotoxicity with longterm use, will continue to follow surveillance echocardiography \par - he is also on MMF\par \par # Osteoporosis , likely from prednisone use \par - pending possible hip surgery if symptoms worsen \par \par # DVT with + lupus anticoagulant\par - he remains on coumadin by his PMD \par \par # Dental infection\par - he has a normal LVEF and NYHA 1 symptoms. no cardiac testing is required prior to dental extraction\par \par RTC in 1 year

## 2023-06-05 NOTE — CARDIOLOGY SUMMARY
[de-identified] : \par TTE 11/2022: LB 5.6 cm, LVEF 55-60%, normal RV size/function\par \par TTE 4/30/21: LV 5.0 cm, LVEF 50-55%, no significant valvular abnormalities, normal biatrial size, normal RV size/function, highly mobile interatrial septum, IVC small/collapsing. \par \par TTE 6/2018: LV 5.3 cm, LVEF 50-55%, normal RV size/function \par  [de-identified] : \par Cardiac MRI 2/2015: LVEDV 195 ml ,LVEF 43%, LGE in inferior wall of LV\par

## 2023-06-05 NOTE — HISTORY OF PRESENT ILLNESS
[FreeTextEntry1] : \par Demetrius Barnes is a 33 YO M with a history of HF with recovered ejection fraction and SLE presenting to HF clinic for for further management. \par \par He was diagnosed with a cardiomyopathy at the age of 25 in 2007. He had low normal LV function with refractory NSVT requiring amiodarone and an endomyocardial biopsy was performed which was inconclusive for lupus myocarditis but did reveal acute inflammation. He underwent a EPS in 2010 where no VT was induced and his amiodarone was discontinued. He has had documented LVEF's as low as 37% but more recently he has had recovery of LV function on medical therapy. \par \par He presents today for followup. He recently had a SLE flare in March treated with increased cellcept. He feels well overall. He continues to note rare palpitations when nervous. Denies syncope. He has been exercising. Denies dyspnea on exertion or lower extremity edema. Denies dizziness or lightheadedness. Denies chest pain or chest pressure.

## 2023-06-13 RX ORDER — BELIMUMAB 400 MG/5ML
400 INJECTION, POWDER, LYOPHILIZED, FOR SOLUTION INTRAVENOUS
Qty: 1 | Refills: 0 | Status: COMPLETED | OUTPATIENT
Start: 2023-06-13 | End: 1900-01-01

## 2023-06-28 ENCOUNTER — APPOINTMENT (OUTPATIENT)
Dept: RHEUMATOLOGY | Facility: CLINIC | Age: 33
End: 2023-06-28
Payer: MEDICAID

## 2023-06-28 VITALS
SYSTOLIC BLOOD PRESSURE: 110 MMHG | OXYGEN SATURATION: 97 % | HEART RATE: 86 BPM | RESPIRATION RATE: 16 BRPM | DIASTOLIC BLOOD PRESSURE: 75 MMHG | TEMPERATURE: 97.9 F

## 2023-06-28 VITALS
RESPIRATION RATE: 16 BRPM | HEART RATE: 84 BPM | OXYGEN SATURATION: 98 % | SYSTOLIC BLOOD PRESSURE: 100 MMHG | DIASTOLIC BLOOD PRESSURE: 64 MMHG

## 2023-06-28 PROCEDURE — 96413 CHEMO IV INFUSION 1 HR: CPT

## 2023-06-28 RX ORDER — BELIMUMAB 400 MG/5ML
400 INJECTION, POWDER, LYOPHILIZED, FOR SOLUTION INTRAVENOUS
Qty: 1 | Refills: 0 | Status: COMPLETED
Start: 2023-06-13

## 2023-06-28 NOTE — HISTORY OF PRESENT ILLNESS
[N/A] : N/A [Denies] : Denies [No] : No [Yes] : Yes [24g] : 24g [Start Time: ___] : Medication Start Time: [unfilled] [End Time: ___] : Medication End Time: [unfilled] [IV discontinued. Intact. No signs or symptoms of IV complications noted. Time: ___] : IV discontinued. Intact. No signs or symptoms of IV complications noted. Time: [unfilled] [Patient  instructed to seek medical attention with signs and symptoms of adverse effects] : Patient  instructed to seek medical attention with signs and symptoms of adverse effects [Patient left unit in no acute distress] : Patient left unit in no acute distress [Medications administered as ordered and tolerated well.] : Medications administered as ordered and tolerated well. [Blood drawn at time of visit] : Blood drawn at time of visit [de-identified] : rash to face and bilateral forearms [de-identified] : right arm radial vein  [de-identified] : labs drawn as prescribed [de-identified] : Patient presents for scheduled Benlysta infusion, ambulatory in [facility]. Today, patient reports overall doing well . However, patient reports intermittent joints pain and morning stiffness, rash to face and bilateral forearms. Moderate daily fatigue persists as per patient. Denies mouth sores  or hair loss. No other symptoms or concerns verbalized. Medication administered as prescribed and infusion tolerated  well.

## 2023-06-29 LAB
ALBUMIN SERPL ELPH-MCNC: 4.5 G/DL
ALP BLD-CCNC: 73 U/L
ALT SERPL-CCNC: 18 U/L
ANION GAP SERPL CALC-SCNC: 17 MMOL/L
APPEARANCE: CLEAR
AST SERPL-CCNC: 25 U/L
BACTERIA: NEGATIVE /HPF
BILIRUB SERPL-MCNC: 0.5 MG/DL
BILIRUBIN URINE: NEGATIVE
BLOOD URINE: NEGATIVE
BUN SERPL-MCNC: 12 MG/DL
C3 SERPL-MCNC: 94 MG/DL
C4 SERPL-MCNC: 16 MG/DL
CALCIUM SERPL-MCNC: 9.9 MG/DL
CAST: 0 /LPF
CHLORIDE SERPL-SCNC: 100 MMOL/L
CK SERPL-CCNC: 308 U/L
CO2 SERPL-SCNC: 23 MMOL/L
COLOR: YELLOW
CREAT SERPL-MCNC: 1.28 MG/DL
CREAT SPEC-SCNC: 253 MG/DL
CREAT/PROT UR: 0.2 RATIO
EGFR: 76 ML/MIN/1.73M2
EPITHELIAL CELLS: 0 /HPF
GLUCOSE QUALITATIVE U: NEGATIVE MG/DL
KETONES URINE: NEGATIVE MG/DL
LEUKOCYTE ESTERASE URINE: NEGATIVE
MICROSCOPIC-UA: NORMAL
NITRITE URINE: NEGATIVE
PH URINE: 5.5
POTASSIUM SERPL-SCNC: 4.2 MMOL/L
PROT SERPL-MCNC: 7.1 G/DL
PROT UR-MCNC: 39 MG/DL
PROTEIN URINE: 30 MG/DL
RED BLOOD CELLS URINE: 1 /HPF
SODIUM SERPL-SCNC: 139 MMOL/L
SPECIFIC GRAVITY URINE: 1.02
UROBILINOGEN URINE: 0.2 MG/DL
WHITE BLOOD CELLS URINE: 1 /HPF

## 2023-06-30 LAB — DSDNA AB SER-ACNC: 93 IU/ML

## 2023-07-26 ENCOUNTER — APPOINTMENT (OUTPATIENT)
Dept: RHEUMATOLOGY | Facility: CLINIC | Age: 33
End: 2023-07-26
Payer: MEDICAID

## 2023-07-26 VITALS
OXYGEN SATURATION: 98 % | SYSTOLIC BLOOD PRESSURE: 109 MMHG | RESPIRATION RATE: 16 BRPM | TEMPERATURE: 97.8 F | DIASTOLIC BLOOD PRESSURE: 73 MMHG | HEART RATE: 91 BPM

## 2023-07-26 VITALS
HEART RATE: 83 BPM | OXYGEN SATURATION: 99 % | DIASTOLIC BLOOD PRESSURE: 72 MMHG | RESPIRATION RATE: 16 BRPM | SYSTOLIC BLOOD PRESSURE: 105 MMHG

## 2023-07-26 PROCEDURE — 96413 CHEMO IV INFUSION 1 HR: CPT

## 2023-07-26 RX ORDER — BELIMUMAB 400 MG/5ML
400 INJECTION, POWDER, LYOPHILIZED, FOR SOLUTION INTRAVENOUS
Qty: 1 | Refills: 0 | Status: COMPLETED
Start: 2023-06-13

## 2023-07-26 NOTE — HISTORY OF PRESENT ILLNESS
[N/A] : N/A [Denies] : Denies [No] : No [Yes] : Yes [24g] : 24g [Start Time: ___] : Medication Start Time: [unfilled] [End Time: ___] : Medication End Time: [unfilled] [IV discontinued. Intact. No signs or symptoms of IV complications noted. Time: ___] : IV discontinued. Intact. No signs or symptoms of IV complications noted. Time: [unfilled] [Patient  instructed to seek medical attention with signs and symptoms of adverse effects] : Patient  instructed to seek medical attention with signs and symptoms of adverse effects [Patient left unit in no acute distress] : Patient left unit in no acute distress [Medications administered as ordered and tolerated well.] : Medications administered as ordered and tolerated well. [Blood drawn at time of visit] : Blood drawn at time of visit [de-identified] : rash to face  [de-identified] : right arm radial vein  [de-identified] : labs drawn as prescribed [de-identified] : Patient presents for scheduled Benlysta infusion, ambulatory in [facility]. Today, patient reports overall doing well . However, patient reports intermittent joints pain and morning stiffness. Cont. with rash to face and moderate daily fatigue persists as per patient. Denies mouth sores  or hair loss. No other symptoms or concerns verbalized. Medication administered as prescribed and infusion tolerated  well.

## 2023-07-27 LAB
ALBUMIN SERPL ELPH-MCNC: 4.7 G/DL
ALP BLD-CCNC: 78 U/L
ALT SERPL-CCNC: 20 U/L
ANION GAP SERPL CALC-SCNC: 22 MMOL/L
APPEARANCE: CLEAR
AST SERPL-CCNC: 27 U/L
BACTERIA: NEGATIVE /HPF
BILIRUB SERPL-MCNC: 0.5 MG/DL
BILIRUBIN URINE: NEGATIVE
BLOOD URINE: NEGATIVE
BUN SERPL-MCNC: 17 MG/DL
C3 SERPL-MCNC: 82 MG/DL
C4 SERPL-MCNC: 14 MG/DL
CALCIUM SERPL-MCNC: 10.6 MG/DL
CAST: 0 /LPF
CHLORIDE SERPL-SCNC: 98 MMOL/L
CK SERPL-CCNC: 307 U/L
CO2 SERPL-SCNC: 22 MMOL/L
COLOR: YELLOW
CREAT SERPL-MCNC: 1.18 MG/DL
CREAT SPEC-SCNC: 268 MG/DL
CREAT/PROT UR: 0.2 RATIO
DSDNA AB SER-ACNC: 252 IU/ML
EGFR: 84 ML/MIN/1.73M2
EPITHELIAL CELLS: 0 /HPF
GLUCOSE QUALITATIVE U: NEGATIVE MG/DL
KETONES URINE: ABNORMAL MG/DL
LEUKOCYTE ESTERASE URINE: NEGATIVE
MICROSCOPIC-UA: NORMAL
NITRITE URINE: NEGATIVE
PH URINE: 5.5
POTASSIUM SERPL-SCNC: 4.3 MMOL/L
PROT SERPL-MCNC: 7.6 G/DL
PROT UR-MCNC: 44 MG/DL
PROTEIN URINE: 30 MG/DL
RED BLOOD CELLS URINE: 1 /HPF
SODIUM SERPL-SCNC: 142 MMOL/L
SPECIFIC GRAVITY URINE: 1.03
UROBILINOGEN URINE: 0.2 MG/DL
WHITE BLOOD CELLS URINE: 3 /HPF

## 2023-08-15 PROCEDURE — 99214 OFFICE O/P EST MOD 30 MIN: CPT | Mod: 95

## 2023-08-28 ENCOUNTER — LABORATORY RESULT (OUTPATIENT)
Age: 33
End: 2023-08-28

## 2023-08-28 ENCOUNTER — APPOINTMENT (OUTPATIENT)
Dept: RHEUMATOLOGY | Facility: CLINIC | Age: 33
End: 2023-08-28
Payer: MEDICAID

## 2023-08-28 VITALS — SYSTOLIC BLOOD PRESSURE: 105 MMHG | HEART RATE: 69 BPM | DIASTOLIC BLOOD PRESSURE: 68 MMHG

## 2023-08-28 VITALS
HEART RATE: 88 BPM | RESPIRATION RATE: 16 BRPM | DIASTOLIC BLOOD PRESSURE: 67 MMHG | SYSTOLIC BLOOD PRESSURE: 99 MMHG | OXYGEN SATURATION: 98 % | TEMPERATURE: 98.3 F

## 2023-08-28 PROCEDURE — 96413 CHEMO IV INFUSION 1 HR: CPT

## 2023-08-28 RX ORDER — BELIMUMAB 400 MG/5ML
400 INJECTION, POWDER, LYOPHILIZED, FOR SOLUTION INTRAVENOUS
Qty: 1 | Refills: 0 | Status: COMPLETED
Start: 2023-06-13

## 2023-08-28 NOTE — HISTORY OF PRESENT ILLNESS
Case Management 11/18  Spoke with Myke at [facility] Pt accepted and on wait list. There is potential for a discharge on Friday which would open spot for next week. Myke will contact parent once this is confirmed or as soon as opening becomes available.    Faxed CPA, TIFFANY and Rule 25 assessment to [facility] for her to follow up once RTC placement is finalized.    LVM's for [facility] with update on discharge and requested to follow up with parent once opening becomes available.   [N/A] : N/A [Denies] : Denies [No] : No [Yes] : Yes [Declined] : Declined [de-identified] : rash to face  [24g] : 24g [Start Time: ___] : Medication Start Time: [unfilled] [End Time: ___] : Medication End Time: [unfilled] [IV discontinued. Intact. No signs or symptoms of IV complications noted. Time: ___] : IV discontinued. Intact. No signs or symptoms of IV complications noted. Time: [unfilled] [Patient  instructed to seek medical attention with signs and symptoms of adverse effects] : Patient  instructed to seek medical attention with signs and symptoms of adverse effects [Patient left unit in no acute distress] : Patient left unit in no acute distress [Medications administered as ordered and tolerated well.] : Medications administered as ordered and tolerated well. [Blood drawn at time of visit] : Blood drawn at time of visit [de-identified] : right arm radial vein  [de-identified] : labs drawn as prescribed [de-identified] : Patient presents for scheduled Benlysta infusion, ambulatory in [facility]. Today, patient reports overall doing well. Reports having intermittent rash to face. No other symptoms or concerns verbalized. Medication administered as prescribed, and infusion tolerated well.

## 2023-08-29 LAB
ALBUMIN SERPL ELPH-MCNC: 4.5 G/DL
ALP BLD-CCNC: 80 U/L
ALT SERPL-CCNC: 18 U/L
ANION GAP SERPL CALC-SCNC: 15 MMOL/L
APPEARANCE: CLEAR
AST SERPL-CCNC: 27 U/L
BACTERIA: NEGATIVE /HPF
BILIRUB SERPL-MCNC: 0.5 MG/DL
BILIRUBIN URINE: NEGATIVE
BLOOD URINE: NEGATIVE
BUN SERPL-MCNC: 13 MG/DL
CALCIUM SERPL-MCNC: 9.6 MG/DL
CAST: 0 /LPF
CHLORIDE SERPL-SCNC: 101 MMOL/L
CK SERPL-CCNC: 377 U/L
CO2 SERPL-SCNC: 25 MMOL/L
COLOR: YELLOW
CREAT SERPL-MCNC: 1.03 MG/DL
CREAT SPEC-SCNC: 147 MG/DL
CREAT/PROT UR: 0.1 RATIO
EGFR: 99 ML/MIN/1.73M2
EPITHELIAL CELLS: 0 /HPF
GLUCOSE QUALITATIVE U: NEGATIVE MG/DL
KETONES URINE: NEGATIVE MG/DL
LEUKOCYTE ESTERASE URINE: NEGATIVE
MICROSCOPIC-UA: NORMAL
NITRITE URINE: NEGATIVE
PH URINE: 5.5
POTASSIUM SERPL-SCNC: 4.3 MMOL/L
PROT SERPL-MCNC: 7.3 G/DL
PROT UR-MCNC: 16 MG/DL
PROTEIN URINE: NORMAL MG/DL
RED BLOOD CELLS URINE: 1 /HPF
SODIUM SERPL-SCNC: 142 MMOL/L
SPECIFIC GRAVITY URINE: 1.02
UROBILINOGEN URINE: 0.2 MG/DL
WHITE BLOOD CELLS URINE: 0 /HPF

## 2023-08-30 LAB
C3 SERPL-MCNC: 89 MG/DL
C4 SERPL-MCNC: 15 MG/DL
DSDNA AB SER-ACNC: 299 IU/ML

## 2023-09-12 ENCOUNTER — APPOINTMENT (OUTPATIENT)
Dept: UROLOGY | Facility: CLINIC | Age: 33
End: 2023-09-12

## 2023-09-25 ENCOUNTER — APPOINTMENT (OUTPATIENT)
Dept: RHEUMATOLOGY | Facility: CLINIC | Age: 33
End: 2023-09-25
Payer: MEDICAID

## 2023-09-25 VITALS
DIASTOLIC BLOOD PRESSURE: 65 MMHG | RESPIRATION RATE: 16 BRPM | HEART RATE: 88 BPM | SYSTOLIC BLOOD PRESSURE: 106 MMHG | OXYGEN SATURATION: 97 %

## 2023-09-25 VITALS
TEMPERATURE: 97.7 F | RESPIRATION RATE: 16 BRPM | HEART RATE: 82 BPM | DIASTOLIC BLOOD PRESSURE: 76 MMHG | SYSTOLIC BLOOD PRESSURE: 130 MMHG | OXYGEN SATURATION: 97 %

## 2023-09-25 PROCEDURE — 96413 CHEMO IV INFUSION 1 HR: CPT

## 2023-09-25 RX ORDER — BELIMUMAB 400 MG/5ML
400 INJECTION, POWDER, LYOPHILIZED, FOR SOLUTION INTRAVENOUS
Qty: 1 | Refills: 0 | Status: COMPLETED
Start: 2023-06-13

## 2023-09-26 LAB
ALBUMIN SERPL ELPH-MCNC: 4.5 G/DL
ALP BLD-CCNC: 86 U/L
ALT SERPL-CCNC: 17 U/L
ANION GAP SERPL CALC-SCNC: 19 MMOL/L
APPEARANCE: CLEAR
AST SERPL-CCNC: 24 U/L
BACTERIA: NEGATIVE /HPF
BILIRUB SERPL-MCNC: 0.3 MG/DL
BILIRUBIN URINE: NEGATIVE
BLOOD URINE: NEGATIVE
BUN SERPL-MCNC: 14 MG/DL
C3 SERPL-MCNC: 115 MG/DL
C4 SERPL-MCNC: 22 MG/DL
CALCIUM SERPL-MCNC: 10.1 MG/DL
CAST: 10 /LPF
CHLORIDE SERPL-SCNC: 101 MMOL/L
CK SERPL-CCNC: 217 U/L
CO2 SERPL-SCNC: 19 MMOL/L
COLOR: YELLOW
CREAT SERPL-MCNC: 1.23 MG/DL
CREAT SPEC-SCNC: 345 MG/DL
CREAT/PROT UR: 0.1 RATIO
DSDNA AB SER-ACNC: 312 IU/ML
EGFR: 80 ML/MIN/1.73M2
EPITHELIAL CELLS: 4 /HPF
GLUCOSE QUALITATIVE U: NEGATIVE MG/DL
INR PPP: 2.66 RATIO
KETONES URINE: NEGATIVE MG/DL
LEUKOCYTE ESTERASE URINE: NEGATIVE
MICROSCOPIC-UA: NORMAL
NITRITE URINE: NEGATIVE
PH URINE: 5
POTASSIUM SERPL-SCNC: 4.4 MMOL/L
PROT SERPL-MCNC: 7.2 G/DL
PROT UR-MCNC: 25 MG/DL
PROTEIN URINE: 30 MG/DL
PT BLD: 29.2 SEC
RED BLOOD CELLS URINE: 2 /HPF
REVIEW: NORMAL
SODIUM SERPL-SCNC: 139 MMOL/L
SPECIFIC GRAVITY URINE: 1.02
UROBILINOGEN URINE: 0.2 MG/DL
WHITE BLOOD CELLS URINE: 5 /HPF

## 2023-10-03 ENCOUNTER — RX RENEWAL (OUTPATIENT)
Age: 33
End: 2023-10-03

## 2023-10-12 ENCOUNTER — RX RENEWAL (OUTPATIENT)
Age: 33
End: 2023-10-12

## 2023-10-24 ENCOUNTER — APPOINTMENT (OUTPATIENT)
Dept: RHEUMATOLOGY | Facility: CLINIC | Age: 33
End: 2023-10-24
Payer: MEDICAID

## 2023-10-24 VITALS
SYSTOLIC BLOOD PRESSURE: 106 MMHG | HEART RATE: 75 BPM | DIASTOLIC BLOOD PRESSURE: 71 MMHG | TEMPERATURE: 97.9 F | OXYGEN SATURATION: 98 %

## 2023-10-24 VITALS — DIASTOLIC BLOOD PRESSURE: 72 MMHG | HEART RATE: 66 BPM | SYSTOLIC BLOOD PRESSURE: 111 MMHG | OXYGEN SATURATION: 95 %

## 2023-10-24 PROCEDURE — ZZZZZ: CPT

## 2023-10-24 PROCEDURE — 90686 IIV4 VACC NO PRSV 0.5 ML IM: CPT

## 2023-10-24 PROCEDURE — G0008: CPT

## 2023-10-24 PROCEDURE — 99214 OFFICE O/P EST MOD 30 MIN: CPT | Mod: 25

## 2023-10-24 PROCEDURE — 96413 CHEMO IV INFUSION 1 HR: CPT

## 2023-10-24 RX ORDER — BELIMUMAB 400 MG/5ML
400 INJECTION, POWDER, LYOPHILIZED, FOR SOLUTION INTRAVENOUS
Qty: 1 | Refills: 0 | Status: COMPLETED
Start: 2023-06-13

## 2023-10-25 LAB
ALBUMIN SERPL ELPH-MCNC: 4.3 G/DL
ALP BLD-CCNC: 80 U/L
ALT SERPL-CCNC: 21 U/L
ANION GAP SERPL CALC-SCNC: 17 MMOL/L
APPEARANCE: CLEAR
AST SERPL-CCNC: 25 U/L
BACTERIA: NEGATIVE /HPF
BILIRUB SERPL-MCNC: 0.4 MG/DL
BILIRUBIN URINE: NEGATIVE
BLOOD URINE: NEGATIVE
BUN SERPL-MCNC: 11 MG/DL
C3 SERPL-MCNC: 96 MG/DL
C4 SERPL-MCNC: 16 MG/DL
CALCIUM SERPL-MCNC: 9.4 MG/DL
CAST: 0 /LPF
CHLORIDE SERPL-SCNC: 99 MMOL/L
CK SERPL-CCNC: 356 U/L
CO2 SERPL-SCNC: 22 MMOL/L
COLOR: YELLOW
CREAT SERPL-MCNC: 1.1 MG/DL
CREAT SPEC-SCNC: 120 MG/DL
CREAT/PROT UR: 0.1 RATIO
EGFR: 91 ML/MIN/1.73M2
EPITHELIAL CELLS: 0 /HPF
GLUCOSE QUALITATIVE U: NEGATIVE MG/DL
KETONES URINE: NEGATIVE MG/DL
LEUKOCYTE ESTERASE URINE: NEGATIVE
MICROSCOPIC-UA: NORMAL
NITRITE URINE: NEGATIVE
PH URINE: 6
POTASSIUM SERPL-SCNC: 4.3 MMOL/L
PROT SERPL-MCNC: 6.9 G/DL
PROT UR-MCNC: 6 MG/DL
PROTEIN URINE: NEGATIVE MG/DL
RED BLOOD CELLS URINE: 1 /HPF
SODIUM SERPL-SCNC: 138 MMOL/L
SPECIFIC GRAVITY URINE: 1.01
UROBILINOGEN URINE: 0.2 MG/DL
WHITE BLOOD CELLS URINE: 0 /HPF

## 2023-10-26 LAB — DSDNA AB SER-ACNC: 145 IU/ML

## 2023-11-01 ENCOUNTER — TRANSCRIPTION ENCOUNTER (OUTPATIENT)
Age: 33
End: 2023-11-01

## 2023-11-01 ENCOUNTER — INPATIENT (INPATIENT)
Facility: HOSPITAL | Age: 33
LOS: 2 days | Discharge: HOME HEALTH | DRG: 513 | End: 2023-11-04
Attending: INTERNAL MEDICINE | Admitting: HOSPITALIST
Payer: MEDICAID

## 2023-11-01 VITALS
HEIGHT: 68 IN | SYSTOLIC BLOOD PRESSURE: 132 MMHG | HEART RATE: 98 BPM | WEIGHT: 184.97 LBS | DIASTOLIC BLOOD PRESSURE: 84 MMHG | TEMPERATURE: 99 F | RESPIRATION RATE: 18 BRPM | OXYGEN SATURATION: 95 %

## 2023-11-01 DIAGNOSIS — M65.9 SYNOVITIS AND TENOSYNOVITIS, UNSPECIFIED: ICD-10-CM

## 2023-11-01 DIAGNOSIS — Z29.9 ENCOUNTER FOR PROPHYLACTIC MEASURES, UNSPECIFIED: ICD-10-CM

## 2023-11-01 DIAGNOSIS — S69.92XS UNSPECIFIED INJURY OF LEFT WRIST, HAND AND FINGER(S), SEQUELA: ICD-10-CM

## 2023-11-01 DIAGNOSIS — D68.61 ANTIPHOSPHOLIPID SYNDROME: ICD-10-CM

## 2023-11-01 DIAGNOSIS — M32.9 SYSTEMIC LUPUS ERYTHEMATOSUS, UNSPECIFIED: ICD-10-CM

## 2023-11-01 LAB
ALBUMIN SERPL ELPH-MCNC: 4.4 G/DL — SIGNIFICANT CHANGE UP (ref 3.3–5)
ALBUMIN SERPL ELPH-MCNC: 4.4 G/DL — SIGNIFICANT CHANGE UP (ref 3.3–5)
ALP SERPL-CCNC: 80 U/L — SIGNIFICANT CHANGE UP (ref 40–120)
ALP SERPL-CCNC: 80 U/L — SIGNIFICANT CHANGE UP (ref 40–120)
ALT FLD-CCNC: 15 U/L — SIGNIFICANT CHANGE UP (ref 10–45)
ALT FLD-CCNC: 15 U/L — SIGNIFICANT CHANGE UP (ref 10–45)
ANION GAP SERPL CALC-SCNC: 10 MMOL/L — SIGNIFICANT CHANGE UP (ref 5–17)
ANION GAP SERPL CALC-SCNC: 10 MMOL/L — SIGNIFICANT CHANGE UP (ref 5–17)
APTT BLD: 48.6 SEC — HIGH (ref 24.5–35.6)
APTT BLD: 48.6 SEC — HIGH (ref 24.5–35.6)
AST SERPL-CCNC: 23 U/L — SIGNIFICANT CHANGE UP (ref 10–40)
AST SERPL-CCNC: 23 U/L — SIGNIFICANT CHANGE UP (ref 10–40)
BASOPHILS # BLD AUTO: 0.06 K/UL — SIGNIFICANT CHANGE UP (ref 0–0.2)
BASOPHILS # BLD AUTO: 0.06 K/UL — SIGNIFICANT CHANGE UP (ref 0–0.2)
BASOPHILS NFR BLD AUTO: 0.9 % — SIGNIFICANT CHANGE UP (ref 0–2)
BASOPHILS NFR BLD AUTO: 0.9 % — SIGNIFICANT CHANGE UP (ref 0–2)
BILIRUB SERPL-MCNC: 0.4 MG/DL — SIGNIFICANT CHANGE UP (ref 0.2–1.2)
BILIRUB SERPL-MCNC: 0.4 MG/DL — SIGNIFICANT CHANGE UP (ref 0.2–1.2)
BLD GP AB SCN SERPL QL: NEGATIVE — SIGNIFICANT CHANGE UP
BLD GP AB SCN SERPL QL: NEGATIVE — SIGNIFICANT CHANGE UP
BUN SERPL-MCNC: 11 MG/DL — SIGNIFICANT CHANGE UP (ref 7–23)
BUN SERPL-MCNC: 11 MG/DL — SIGNIFICANT CHANGE UP (ref 7–23)
CALCIUM SERPL-MCNC: 9.3 MG/DL — SIGNIFICANT CHANGE UP (ref 8.4–10.5)
CALCIUM SERPL-MCNC: 9.3 MG/DL — SIGNIFICANT CHANGE UP (ref 8.4–10.5)
CHLORIDE SERPL-SCNC: 104 MMOL/L — SIGNIFICANT CHANGE UP (ref 96–108)
CHLORIDE SERPL-SCNC: 104 MMOL/L — SIGNIFICANT CHANGE UP (ref 96–108)
CO2 SERPL-SCNC: 26 MMOL/L — SIGNIFICANT CHANGE UP (ref 22–31)
CO2 SERPL-SCNC: 26 MMOL/L — SIGNIFICANT CHANGE UP (ref 22–31)
CREAT SERPL-MCNC: 1.08 MG/DL — SIGNIFICANT CHANGE UP (ref 0.5–1.3)
CREAT SERPL-MCNC: 1.08 MG/DL — SIGNIFICANT CHANGE UP (ref 0.5–1.3)
EGFR: 94 ML/MIN/1.73M2 — SIGNIFICANT CHANGE UP
EGFR: 94 ML/MIN/1.73M2 — SIGNIFICANT CHANGE UP
ELLIPTOCYTES BLD QL SMEAR: SLIGHT — SIGNIFICANT CHANGE UP
ELLIPTOCYTES BLD QL SMEAR: SLIGHT — SIGNIFICANT CHANGE UP
EOSINOPHIL # BLD AUTO: 0.06 K/UL — SIGNIFICANT CHANGE UP (ref 0–0.5)
EOSINOPHIL # BLD AUTO: 0.06 K/UL — SIGNIFICANT CHANGE UP (ref 0–0.5)
EOSINOPHIL NFR BLD AUTO: 0.9 % — SIGNIFICANT CHANGE UP (ref 0–6)
EOSINOPHIL NFR BLD AUTO: 0.9 % — SIGNIFICANT CHANGE UP (ref 0–6)
GLUCOSE SERPL-MCNC: 95 MG/DL — SIGNIFICANT CHANGE UP (ref 70–99)
GLUCOSE SERPL-MCNC: 95 MG/DL — SIGNIFICANT CHANGE UP (ref 70–99)
HCT VFR BLD CALC: 39.8 % — SIGNIFICANT CHANGE UP (ref 39–50)
HCT VFR BLD CALC: 39.8 % — SIGNIFICANT CHANGE UP (ref 39–50)
HGB BLD-MCNC: 12.6 G/DL — LOW (ref 13–17)
HGB BLD-MCNC: 12.6 G/DL — LOW (ref 13–17)
INR BLD: 2.56 RATIO — HIGH (ref 0.85–1.18)
INR BLD: 2.56 RATIO — HIGH (ref 0.85–1.18)
LYMPHOCYTES # BLD AUTO: 0.98 K/UL — LOW (ref 1–3.3)
LYMPHOCYTES # BLD AUTO: 0.98 K/UL — LOW (ref 1–3.3)
LYMPHOCYTES # BLD AUTO: 14 % — SIGNIFICANT CHANGE UP (ref 13–44)
LYMPHOCYTES # BLD AUTO: 14 % — SIGNIFICANT CHANGE UP (ref 13–44)
MANUAL SMEAR VERIFICATION: SIGNIFICANT CHANGE UP
MANUAL SMEAR VERIFICATION: SIGNIFICANT CHANGE UP
MCHC RBC-ENTMCNC: 28.8 PG — SIGNIFICANT CHANGE UP (ref 27–34)
MCHC RBC-ENTMCNC: 28.8 PG — SIGNIFICANT CHANGE UP (ref 27–34)
MCHC RBC-ENTMCNC: 31.7 GM/DL — LOW (ref 32–36)
MCHC RBC-ENTMCNC: 31.7 GM/DL — LOW (ref 32–36)
MCV RBC AUTO: 91.1 FL — SIGNIFICANT CHANGE UP (ref 80–100)
MCV RBC AUTO: 91.1 FL — SIGNIFICANT CHANGE UP (ref 80–100)
MONOCYTES # BLD AUTO: 0.56 K/UL — SIGNIFICANT CHANGE UP (ref 0–0.9)
MONOCYTES # BLD AUTO: 0.56 K/UL — SIGNIFICANT CHANGE UP (ref 0–0.9)
MONOCYTES NFR BLD AUTO: 7.9 % — SIGNIFICANT CHANGE UP (ref 2–14)
MONOCYTES NFR BLD AUTO: 7.9 % — SIGNIFICANT CHANGE UP (ref 2–14)
NEUTROPHILS # BLD AUTO: 5.36 K/UL — SIGNIFICANT CHANGE UP (ref 1.8–7.4)
NEUTROPHILS # BLD AUTO: 5.36 K/UL — SIGNIFICANT CHANGE UP (ref 1.8–7.4)
NEUTROPHILS NFR BLD AUTO: 75.4 % — SIGNIFICANT CHANGE UP (ref 43–77)
NEUTROPHILS NFR BLD AUTO: 75.4 % — SIGNIFICANT CHANGE UP (ref 43–77)
NEUTS BAND # BLD: 0.9 % — SIGNIFICANT CHANGE UP (ref 0–8)
NEUTS BAND # BLD: 0.9 % — SIGNIFICANT CHANGE UP (ref 0–8)
OVALOCYTES BLD QL SMEAR: SLIGHT — SIGNIFICANT CHANGE UP
OVALOCYTES BLD QL SMEAR: SLIGHT — SIGNIFICANT CHANGE UP
PLAT MORPH BLD: ABNORMAL
PLAT MORPH BLD: ABNORMAL
PLATELET # BLD AUTO: 187 K/UL — SIGNIFICANT CHANGE UP (ref 150–400)
PLATELET # BLD AUTO: 187 K/UL — SIGNIFICANT CHANGE UP (ref 150–400)
POIKILOCYTOSIS BLD QL AUTO: SIGNIFICANT CHANGE UP
POIKILOCYTOSIS BLD QL AUTO: SIGNIFICANT CHANGE UP
POTASSIUM SERPL-MCNC: 4 MMOL/L — SIGNIFICANT CHANGE UP (ref 3.5–5.3)
POTASSIUM SERPL-MCNC: 4 MMOL/L — SIGNIFICANT CHANGE UP (ref 3.5–5.3)
POTASSIUM SERPL-SCNC: 4 MMOL/L — SIGNIFICANT CHANGE UP (ref 3.5–5.3)
POTASSIUM SERPL-SCNC: 4 MMOL/L — SIGNIFICANT CHANGE UP (ref 3.5–5.3)
PROT SERPL-MCNC: 7.3 G/DL — SIGNIFICANT CHANGE UP (ref 6–8.3)
PROT SERPL-MCNC: 7.3 G/DL — SIGNIFICANT CHANGE UP (ref 6–8.3)
PROTHROM AB SERPL-ACNC: 27.4 SEC — HIGH (ref 9.5–13)
PROTHROM AB SERPL-ACNC: 27.4 SEC — HIGH (ref 9.5–13)
RBC # BLD: 4.37 M/UL — SIGNIFICANT CHANGE UP (ref 4.2–5.8)
RBC # BLD: 4.37 M/UL — SIGNIFICANT CHANGE UP (ref 4.2–5.8)
RBC # FLD: 13.7 % — SIGNIFICANT CHANGE UP (ref 10.3–14.5)
RBC # FLD: 13.7 % — SIGNIFICANT CHANGE UP (ref 10.3–14.5)
RBC BLD AUTO: ABNORMAL
RBC BLD AUTO: ABNORMAL
RH IG SCN BLD-IMP: POSITIVE — SIGNIFICANT CHANGE UP
RH IG SCN BLD-IMP: POSITIVE — SIGNIFICANT CHANGE UP
SODIUM SERPL-SCNC: 140 MMOL/L — SIGNIFICANT CHANGE UP (ref 135–145)
SODIUM SERPL-SCNC: 140 MMOL/L — SIGNIFICANT CHANGE UP (ref 135–145)
WBC # BLD: 7.03 K/UL — SIGNIFICANT CHANGE UP (ref 3.8–10.5)
WBC # BLD: 7.03 K/UL — SIGNIFICANT CHANGE UP (ref 3.8–10.5)
WBC # FLD AUTO: 7.03 K/UL — SIGNIFICANT CHANGE UP (ref 3.8–10.5)
WBC # FLD AUTO: 7.03 K/UL — SIGNIFICANT CHANGE UP (ref 3.8–10.5)

## 2023-11-01 PROCEDURE — 99223 1ST HOSP IP/OBS HIGH 75: CPT

## 2023-11-01 PROCEDURE — 73140 X-RAY EXAM OF FINGER(S): CPT | Mod: 26,LT

## 2023-11-01 PROCEDURE — 99285 EMERGENCY DEPT VISIT HI MDM: CPT

## 2023-11-01 RX ORDER — ACETAMINOPHEN 500 MG
1000 TABLET ORAL ONCE
Refills: 0 | Status: COMPLETED | OUTPATIENT
Start: 2023-11-01 | End: 2023-11-01

## 2023-11-01 RX ORDER — AMPICILLIN SODIUM AND SULBACTAM SODIUM 250; 125 MG/ML; MG/ML
3 INJECTION, POWDER, FOR SUSPENSION INTRAMUSCULAR; INTRAVENOUS EVERY 6 HOURS
Refills: 0 | Status: DISCONTINUED | OUTPATIENT
Start: 2023-11-02 | End: 2023-11-02

## 2023-11-01 RX ORDER — AMPICILLIN SODIUM AND SULBACTAM SODIUM 250; 125 MG/ML; MG/ML
INJECTION, POWDER, FOR SUSPENSION INTRAMUSCULAR; INTRAVENOUS
Refills: 0 | Status: DISCONTINUED | OUTPATIENT
Start: 2023-11-01 | End: 2023-11-02

## 2023-11-01 RX ORDER — AMPICILLIN SODIUM AND SULBACTAM SODIUM 250; 125 MG/ML; MG/ML
3 INJECTION, POWDER, FOR SUSPENSION INTRAMUSCULAR; INTRAVENOUS ONCE
Refills: 0 | Status: COMPLETED | OUTPATIENT
Start: 2023-11-01 | End: 2023-11-01

## 2023-11-01 RX ADMIN — Medication 400 MILLIGRAM(S): at 23:53

## 2023-11-01 RX ADMIN — Medication 100 MILLIGRAM(S): at 20:59

## 2023-11-01 RX ADMIN — AMPICILLIN SODIUM AND SULBACTAM SODIUM 200 GRAM(S): 250; 125 INJECTION, POWDER, FOR SUSPENSION INTRAMUSCULAR; INTRAVENOUS at 19:53

## 2023-11-01 NOTE — ED ADULT TRIAGE NOTE - CHIEF COMPLAINT QUOTE
left 3rd finger swelling onset of today s/p cut with razor blade yesterday. seen at [facility] md and sent here for increased swelling. Denies fever

## 2023-11-01 NOTE — ED ADULT NURSE NOTE - OBJECTIVE STATEMENT
Break Coverage RN. Pt is a 34yo m coming from home c/o left hand pain s/p injury. PT states that he was shaving and dropped the blade cutting his left hand, pt then woke up this AM with left hand "swollen and red and in intense pain". PT states that he then came to ED for possible infection. PMH of Lupus. PT A,Ox4, ambulatory at baseline. Respirations even and unlabored, abd soft, nondistended and nontender, skin warm, dry, LUE edema and erythema noted, PATEL. Denies HA, CP, SOB, n/v/d, fever, chills and urinary symptoms. Stretcher locked in lowest position, appropriate side rails up for safety, pt instructed to call for RN if anything needed.

## 2023-11-01 NOTE — H&P ADULT - NSHPOUTPATIENTPROVIDERS_GEN_ALL_CORE
PCP: Constantino  Rheum: aTmara  Cards: Lorenzo PCP: Constantino  Rheum: Tamara  Cards: Lorenzo  Heme: Johnie

## 2023-11-01 NOTE — H&P ADULT - HISTORY OF PRESENT ILLNESS
33M SLE/APS/Raynauds (on chronic immunosuppression c/b cutaneous LE vasculitis and periungual infarcts, polyarthritis, nephritis, myopericarditis, RLE DVT/PE 2013 on AC, osteonecrosis hips/shoulders/knees, followed by rheum Dr. Mcdonald), HF w/ recovered EF (followed by cards Dr. Ventura) p/w L 3rd digit razor blade injury while shaving x 2 days ago, w/ resultant edema x 1 day, initially presented to UC and referred to ED.     VS: afebrile tmax 37.1C, HR 60s-90s, BP 120s-130s/70s-80s, RR 17-18, SpO2% 95-97 RA   Labs: normocytic (MCV 91.1) anemia H/H 12.6/39.8; INR prolonged 2.56;   Micro: unavailable; BCx x 2 in lab  Imaging:   - XR L fingers: no acute fractures   Received: unasyn 3g IV, doxy 100mg PO    Plastic surgery Dr. Jonathan Kirkpatrick c/s by ED, per chart note he evaluated pt at bedside, though pending full c/s note, eval LD3 cellulitis, possible flexor tenosynovitis, may require operative washout, recs abx, MRI, NPO past MN.     admit to medicine for further eval/mgt 33M SLE/APS/Raynauds (on chronic immunosuppression c/b cutaneous LE vasculitis and periungual infarcts, polyarthritis, nephritis, myopericarditis, RLE DVT/PE 2013 on AC, osteonecrosis hips/shoulders/knees, followed by rheum Dr. Mcdonald), HF w/ recovered EF (followed by cards Dr. Ventura) p/w L 3rd digit injury. Pt relays bl health when 10/29 experienced injury to L 3rd digit while organizing razor blade (uses double edged razor blade w/ safety razor to shave face) w/ resulting bleed which he applied pressure to w/ successful hemostasis. This AM he noticed swelling in area, in addition to pain, prompting UC presentation, who referred him to ED. Otherwise not endorsing complaints.     VS: afebrile tmax 37.1C, HR 60s-90s, BP 120s-130s/70s-80s, RR 17-18, SpO2% 95-97 RA   Labs: normocytic (MCV 91.1) anemia H/H 12.6/39.8; INR prolonged 2.56;   Micro: unavailable; BCx x 2 in lab  Imaging:   - XR L fingers: no acute fractures   Received: unasyn 3g IV, doxy 100mg PO    Plastic surgery Dr. Jonathan Kirkpatrick c/s by ED, per chart note he evaluated pt at bedside, though pending full c/s note, eval LD3 cellulitis, possible flexor tenosynovitis, may require operative washout, recs abx, MRI, NPO past MN.     admit to medicine for further eval/mgt

## 2023-11-01 NOTE — ED ADULT NURSE NOTE - CHIEF COMPLAINT QUOTE
left 3rd finger swelling onset of today s/p cut with razor blade yesterday. seen at [facility] md and sent here for increased swelling. Denies fever

## 2023-11-01 NOTE — H&P ADULT - NSHPPHYSICALEXAM_GEN_ALL_CORE
PHYSICAL EXAM:  GENERAL: NAD, well-groomed, well-developed, pleasant to interview  HEAD: Atraumatic, Normocephalic  EYES: PERRL, conjunctiva and sclera clear  ENMT: No tonsillar erythema, exudates, or enlargement; Moist mucous membranes  NECK: Supple  NERVOUS SYSTEM: Alert & Oriented X4, Good concentration; Motor Strength 5/5 B/L upper and lower extremities (L 3rd digit as below)  CHEST/LUNG: Clear to auscultation bilaterally; No rales, rhonchi, wheezing, or rubs  HEART: Regular rate and rhythm; No murmurs, rubs, or gallops  ABDOMEN: Soft, Nontender, Nondistended; Bowel sounds present. No guarding, rebound tenderness, or rigidity.  EXTREMITIES: 2+ Peripheral Pulses +RLE larger than LLE which pt states is chronic since DVT in past. +L 3rd digit w/ erythema/edema, difficult to see site of healing laceration in area of flexor aspect PIP joint given pain to finger extension, not warm to palpation, sensation intact, states movement limited by pain rather than weakness able to move minimally. Erythema extending proximally to palmar aspect MCP joint same finger w/ tenderness to palpation.

## 2023-11-01 NOTE — H&P ADULT - NSHPREVIEWOFSYSTEMS_GEN_ALL_CORE
REVIEW OF SYSTEMS:  CONSTITUTIONAL: No fever or chills  EYES: No visual disturbances or eye pain  ENMT: No sinus or throat pain  RESPIRATORY: No shortness of breath or cough  CARDIOVASCULAR: No chest pain or dizziness  GASTROINTESTINAL: No abdominal or epigastric pain. No diarrhea or constipation. No melena or hematochezia.   GENITOURINARY: No dysuria or hematuria  NEUROLOGICAL: No headaches, loss of strength or numbness  MUSCULOSKELETAL: +L 3rd digit laceration, swelling, pain as per HPI

## 2023-11-01 NOTE — ED PROVIDER NOTE - PROGRESS NOTE DETAILS
Yuval Dorantes NP-C -  Dr. Sasson called regarding pt's probable flexor tenosynovitis of left thrid digit.  Dr. Martines unable to come in to see pt.  Dr. Martin called, he is second on call for plastics. SPoke with him and he is not able to see pt as well.  Will continue to obtain hand consult. Yuval Dorantes NP-C -  Dr. Sasson called regarding pt's probable flexor tenosynovitis of left thrid digit.  Dr. Martines unable to come in to see pt.  Dr. Martin called, he is second on call for plastics. SPoke with him and he is not able to see pt as well.  Will continue to obtain hand consult.  As per Dr. Martines,  admit to medicine and start abx,  hand can see pt in am.  Dr. Kirkpatrick called and awaiting call back Yuval Dorantes NP-C - Upon awaiting call back from Sr. Kirkpatrick, Dr. Menon called, he declined to see pt at this time as he is unable to see pt in am.  Dr. Quintanilla callled, spoke to service,  Dr. Hoffmann refused to discuss pt for hand consult as she is not on call as per her service.   Spoke with Dr. Kirkpatrick, he will come in to ER to see pt and evaluate. pt C/O L finger pain; will give apap as he has not received pain Rx thus far.  Exam overall is unchanged from prior -- no worsening erythema, tenderness to palpation, or swelling.  No new crepitation or streaking.  --BMM Patient seen by Dr. Kirkpatrick.  Bedside US with Dr. Kirkpatrick at bedside shows trace increased fluid in L 3rd digit but +tendon glide.  No indication for OR at this time but plan will be to place pt on add-on for OR tomorrow if washout is indicated.  Already received IV abx.  Pt with some resistance to admission to hospital intially but understands inadequate treatment (ie, if discharged) could result in morbidity including loss of function of finger, sepsis, and possible mortality given immunosuppression.  Amenable to admission after this discussion.  --BMM Yuval Dorantes NP-C - Pt awaiting MRI,  called MRI tech 3x, no answer,  called radiology resident to make aware. Called rads resident, who will call in MRI tech in to perform MR.  --BMM

## 2023-11-01 NOTE — ED PROVIDER NOTE - CLINICAL SUMMARY MEDICAL DECISION MAKING FREE TEXT BOX
Very pleasant 33-year-old man with a history of lupus on multiple immunosuppressive agents including Plaquenil and CellCept presenting with several days of left middle finger swelling/pain after excellently cutting himself on a shaving razor.  Referred here from urgent care given concern for hand infection.  Examination today is consistent with flexor tenosynovitis of the left third digit.  No evidence of compartment syndrome, no real concern for necrotizing fasciitis based on examination and x-ray.  Given immunosuppression patient will need IV antibiotics (+doxycycline given true vancomycin allergy), emergent hand consultation, preop labs, admission to the hospital.

## 2023-11-01 NOTE — H&P ADULT - NSICDXFAMILYHX_GEN_ALL_CORE_FT
FAMILY HISTORY:  Mother  Still living? Unknown  Family history of systemic lupus erythematosus (SLE) in mother, Age at diagnosis: Age Unknown     FAMILY HISTORY:  Father  Still living? Unknown  Family history of inclusion body myositis, Age at diagnosis: Age Unknown    Mother  Still living? Unknown  Family history of systemic lupus erythematosus (SLE) in mother, Age at diagnosis: Age Unknown

## 2023-11-01 NOTE — H&P ADULT - PROBLEM SELECTOR PLAN 2
SLE/APLS/Raynauds (on chronic immunosuppression c/b cutaneous LE vasculitis and periungual infarcts, polyarthritis, nephritis, myopericarditis, RLE DVT/PE 2013 on AC, osteonecrosis hips/shoulders/knees, followed by rheum Dr. Mcdonald)  - contact Dr. Mcdonald in AM re continuation of immunosuppression  in context of LD3 cellulitis, possible flexor tenosynovitis, hold in interim

## 2023-11-01 NOTE — ED PROVIDER NOTE - OBJECTIVE STATEMENT
34 yo male in blue crump presents to the ER for evaluation of left middle digit injury. PT awake alert oriented x3 states " I cut my middle finger with a razor blade two days ago and it started to swell up yesterday. I have a history of systemic lupus and I thought my hand was swollen from that but today it became much worse.  I went to urgent care and they sent me to be seen in the ER". Pt denies fevers and chills.   Linear  Wound to flexor aspect of left 3rd digit. Pt finger held in flexion, with pt unable to flex or extend finger without severe pain.  Entire digit swollen and pain radiating to left palm.   Pt taking medications for lupus including  monthly  immunologic infusion.   DEnies fevers and chills.

## 2023-11-01 NOTE — H&P ADULT - PROBLEM SELECTOR PLAN 1
razor blade injury while shaving, resultant edema  eval'd by plastic surgery Dr. Kirkpatrick in ED c/f LD3 cellulitis, possible flexor tenosynovitis  ordered for unasyn, doxy by ED  - contacted Dr. Kirkpatrick re abx recommendation  - f/u MR finger  - NPO ON, f/u further plastics recs in AM re potential washout razor blade injury, resultant edema  eval'd by plastic surgery Dr. Kirkpatrick in ED c/f LD3 cellulitis, possible flexor tenosynovitis  ordered for unasyn, doxy by ED  - contacted Dr. Kirkpatrick re abx recommendation w/o answer   - f/u MR finger  - NPO ON, f/u further plastics recs in AM re potential washout razor blade injury, resultant edema  eval'd by plastic surgery Dr. Kirkpatrick in ED c/f LD3 cellulitis, possible flexor tenosynovitis  ordered for unasyn, doxy by ED  - contacted Dr. Kirkpatrick re abx recommendation w/o answer x 2  - f/u MR finger  - NPO ON, f/u further plastics recs in AM re potential washout razor blade injury, resultant edema  eval'd by plastic surgery Dr. Kirkpatrick in ED c/f LD3 cellulitis, possible flexor tenosynovitis  ordered for unasyn, doxy by ED  personally contacted Dr. Kirkpatrick re abx recommendation w/o answer x 2, ID fellow paged for abx selection however no answer as well  - though pt w/o systemic signs of toxicity, and no purulent drainage/exudate, he is in fact immunosuppressed and therefore will initiate broad spectrum parenteral abx w/ dapto (alt to vanc given allergy), cefepime and narrow per Cx data (will obtain MRSA swab)   - w/ regard to tetanus ppx, pt states he has been vaccinated w/i past 5 yrs and that razor blade was free of rust, and used w/i past 2 wks   - f/u MR finger  - NPO ON, f/u further plastics recs in AM re potential washout razor blade injury, resultant edema  eval'd by plastic surgery Dr. Kirkpatrick in ED c/f LD3 cellulitis, possible flexor tenosynovitis  ordered for unasyn, doxy by ED  personally contacted Dr. Kirkpatrick re abx recommendation w/o answer x 2, ID fellow paged for abx selection however no answer as well  - though pt w/o systemic signs of toxicity, and no purulent drainage/exudate, he is in fact immunosuppressed and therefore will initiate broad spectrum parenteral abx w/ dapto (alt to vanc given allergy), cefepime and narrow per Cx data (will obtain MRSA swab)   - w/ regard to tetanus ppx, pt states he has been vaccinated w/i past 5 yrs and that razor blade was free of rust, and used w/i past 2 wks, ED did not administer ppx  - f/u MR finger  - NPO ON, f/u further plastics recs in AM re potential washout razor blade injury, resultant edema  eval'd by plastic surgery Dr. Kirkpatrick in ED c/f LD3 cellulitis, possible flexor tenosynovitis  ordered for unasyn, doxy by ED  personally contacted Dr. Kirkpatrick re abx recommendation w/o answer x 2, ID fellow paged for abx selection however no answer as well  - though pt w/o systemic signs of toxicity, and no purulent drainage/exudate, he is in fact immunosuppressed and therefore will initiate broad spectrum parenteral abx w/ dapto (alt to vanc given allergy), cefepime and narrow per Cx data (will obtain MRSA swab)   - w/ regard to tetanus ppx, pt states he has been vaccinated w/i past 5 yrs and that razor blade was free of rust, and used w/i past 2 wks, ED did not administer ppx, can consider ID input in AM   - f/u  finger  - NPO ON, f/u further plastics recs in AM re potential washout razor blade injury, resultant edema  eval'd by plastic surgery Dr. Kirkpatrick in ED c/f LD3 cellulitis, possible flexor tenosynovitis  ordered for unasyn, doxy by ED  personally contacted Dr. Kirkpatrick re abx recommendation w/o answer x 2  - though pt w/o systemic signs of toxicity, and no purulent drainage/exudate, he is in fact immunosuppressed and therefore will initiate broad spectrum parenteral abx w/ dapto (alt to vanc given allergy, though per pharmacy policy will need ID c/s, paged ID fellow w/o response therefore d/w pharmacy to approve), cefepime and narrow per Cx data (will obtain MRSA swab)   - w/ regard to tetanus ppx, pt states he has been vaccinated w/i past 5 yrs and that razor blade was free of rust, and used w/i past 2 wks, ED did not administer ppx, can consider ID input in AM   - f/u MR finger  - NPO ON, f/u further plastics recs in AM re potential washout razor blade injury, resultant edema  eval'd by plastic surgery Dr. Kirkpatrick in ED c/f LD3 cellulitis, possible flexor tenosynovitis  ordered for unasyn, doxy by ED  personally contacted Dr. Kirkpatrick re abx recommendation w/o answer x 2  - pt w/o systemic signs of toxicity, and no purulent drainage/exudate, though he is immunosuppressed will dose cefazolin to cover strep/MSSA (d/w ID fellow) however if decompensates would consider escalation to broader spectrum abx (dapto given vanc allergy, and cefepime) and narrow per Cx data (will obtain MRSA swab)   - w/ regard to tetanus ppx, pt states he has been vaccinated w/i past 5 yrs and that razor blade was free of rust, and used w/i past 2 wks, ED did not administer ppx, will hold (d/w ID fellow)   - f/u MR finger  - NPO ON, f/u further plastics recs in AM re potential washout razor blade injury, resultant edema  eval'd by plastic surgery Dr. Kirkpatrick in ED c/f LD3 cellulitis, possible flexor tenosynovitis  ordered for unasyn, doxy by ED  personally contacted Dr. Kirkpatrick re abx recommendation w/o answer x 2  - pt w/o systemic signs of toxicity, and no purulent drainage/exudate, though he is immunosuppressed will dose cefazolin to cover strep/MSSA (discussed w/ ID fellow) however if decompensates would consider escalation to broader spectrum abx (dapto given vanc allergy, and cefepime) and narrow per Cx data (will obtain MRSA swab)   - w/ regard to tetanus ppx, pt states he has been vaccinated w/i past 5 yrs and that razor blade was free of rust, and used w/i past 2 wks, ED did not administer ppx, will hold (discussed w/ ID fellow)   - f/u  finger  - NPO ON, f/u further plastics recs in AM re potential washout

## 2023-11-01 NOTE — H&P ADULT - PROBLEM SELECTOR PLAN 5
Screening EKG: not available in MUSE or physical chart, will order for f/u  Chemical DVT ppx: INR prolonged on coumadin   Diet: NPO ON for potential plastics intervention   Precautions: fall  Dispo: pending course

## 2023-11-01 NOTE — PROGRESS NOTE ADULT - SUBJECTIVE AND OBJECTIVE BOX
Pt seen  Chart reviewed  Full consult dictation to follow    LD3 cellulitis, possible flexor tenosynovitis - may require operative washout  Case disc'd with ED  Agree with admission, ABx  Recommend MRI, NPO past midnight

## 2023-11-01 NOTE — H&P ADULT - TIME BILLING
- Ordering, reviewing, and interpreting labs, testing, and imaging.  - Independently obtaining a review of systems and performing a physical exam  - Reviewing consultant documentation/recommendations in addition to attempting to discuss plan of care with consultants.  - Counselling and educating patient regarding interpretation of aforementioned items and plan of care. - Ordering, reviewing, and interpreting labs, testing, and imaging.  - Independently obtaining a review of systems and performing a physical exam  - Reviewing consultant documentation/recommendations in addition to discuss plan of care with consultants.  - Counselling and educating patient regarding interpretation of aforementioned items and plan of care.

## 2023-11-01 NOTE — H&P ADULT - PROBLEM SELECTOR PLAN 3
as above  - f/u coags in AM, consider coumadin dose pending plastic recs re OR for washout as above  - f/u coags in AM, consider coumadin dose (last dose night PTA) pending plastic recs re OR for washout as above  followed by heme Dr. Danielle Jett   - f/u coaalia in AM, consider coumadin dose (last dose night PTA) pending plastic recs re OR for washout

## 2023-11-01 NOTE — H&P ADULT - ASSESSMENT
33M SLE/APLS/Raynauds (on chronic immunosuppression c/b cutaneous LE vasculitis and periungual infarcts, polyarthritis, nephritis, myopericarditis, RLE DVT/PE 2013 on AC, osteonecrosis hips/shoulders/knees, followed by rheum Dr. Mcdonald), HF w/ recovered EF (followed by cards Dr. Ventura) p/w L 3rd digit razor blade injury while shaving x 2 days ago, w/ resultant edema x 1 day, initially presented to UC and referred to ED.  33M SLE/APLS/Raynauds (on chronic immunosuppression c/b cutaneous LE vasculitis and periungual infarcts, polyarthritis, nephritis, myopericarditis, RLE DVT/PE 2013 on AC, osteonecrosis hips/shoulders/knees, followed by rheum Dr. Mcdonald), HF w/ recovered EF (followed by cards Dr. Ventura) a/w L 3rd digit cellulitis, possible flexor tenosynovitis in context of laceration (razor blade injury while organizing shaving tools), pending MRI, plastic surgery Dr. Kirkpatrick on board

## 2023-11-01 NOTE — H&P ADULT - NSHPSOCIALHISTORY_GEN_ALL_CORE
lives alone, ambulates w/o assistive device, formerly employed by [employer] and then [employer], currently unemployed, born [place]

## 2023-11-01 NOTE — H&P ADULT - PROBLEM SELECTOR PLAN 4
Chemical DVT ppx: INR prolonged on coumadin   Diet: NPO ON for potential plastics interventin  Precautions: fall  Dispo: pending course Screening EKG: not available in MUSE, will order for f/u  Chemical DVT ppx: INR prolonged on coumadin   Diet: NPO ON for potential plastics interventin  Precautions: fall  Dispo: pending course Screening EKG: not available in MUSE or physical chart, will order for f/u  Chemical DVT ppx: INR prolonged on coumadin   Diet: NPO ON for potential plastics intervention   Precautions: fall  Dispo: pending course as low as 37%, more recently w/ recovery of LV fn on med tx  followed by cards Dr. Ventura, last seen 6/2023 per HIE review   - c/w ACEi (lisinopril 10mg QD), BB (metoprolol succinate 200mg QD)  - monitor Is/Os, daily wt

## 2023-11-02 DIAGNOSIS — I50.22 CHRONIC SYSTOLIC (CONGESTIVE) HEART FAILURE: ICD-10-CM

## 2023-11-02 LAB
ANION GAP SERPL CALC-SCNC: 13 MMOL/L — SIGNIFICANT CHANGE UP (ref 5–17)
ANION GAP SERPL CALC-SCNC: 13 MMOL/L — SIGNIFICANT CHANGE UP (ref 5–17)
APTT BLD: 46.1 SEC — HIGH (ref 24.5–35.6)
APTT BLD: 46.1 SEC — HIGH (ref 24.5–35.6)
BUN SERPL-MCNC: 11 MG/DL — SIGNIFICANT CHANGE UP (ref 7–23)
BUN SERPL-MCNC: 11 MG/DL — SIGNIFICANT CHANGE UP (ref 7–23)
CALCIUM SERPL-MCNC: 8.9 MG/DL — SIGNIFICANT CHANGE UP (ref 8.4–10.5)
CALCIUM SERPL-MCNC: 8.9 MG/DL — SIGNIFICANT CHANGE UP (ref 8.4–10.5)
CHLORIDE SERPL-SCNC: 104 MMOL/L — SIGNIFICANT CHANGE UP (ref 96–108)
CHLORIDE SERPL-SCNC: 104 MMOL/L — SIGNIFICANT CHANGE UP (ref 96–108)
CO2 SERPL-SCNC: 22 MMOL/L — SIGNIFICANT CHANGE UP (ref 22–31)
CO2 SERPL-SCNC: 22 MMOL/L — SIGNIFICANT CHANGE UP (ref 22–31)
CREAT SERPL-MCNC: 0.96 MG/DL — SIGNIFICANT CHANGE UP (ref 0.5–1.3)
CREAT SERPL-MCNC: 0.96 MG/DL — SIGNIFICANT CHANGE UP (ref 0.5–1.3)
EGFR: 108 ML/MIN/1.73M2 — SIGNIFICANT CHANGE UP
EGFR: 108 ML/MIN/1.73M2 — SIGNIFICANT CHANGE UP
GLUCOSE SERPL-MCNC: 81 MG/DL — SIGNIFICANT CHANGE UP (ref 70–99)
GLUCOSE SERPL-MCNC: 81 MG/DL — SIGNIFICANT CHANGE UP (ref 70–99)
HCT VFR BLD CALC: 40.3 % — SIGNIFICANT CHANGE UP (ref 39–50)
HCT VFR BLD CALC: 40.3 % — SIGNIFICANT CHANGE UP (ref 39–50)
HGB BLD-MCNC: 12.4 G/DL — LOW (ref 13–17)
HGB BLD-MCNC: 12.4 G/DL — LOW (ref 13–17)
INR BLD: 2.44 RATIO — HIGH (ref 0.85–1.18)
INR BLD: 2.44 RATIO — HIGH (ref 0.85–1.18)
MAGNESIUM SERPL-MCNC: 1.8 MG/DL — SIGNIFICANT CHANGE UP (ref 1.6–2.6)
MAGNESIUM SERPL-MCNC: 1.8 MG/DL — SIGNIFICANT CHANGE UP (ref 1.6–2.6)
MCHC RBC-ENTMCNC: 28.1 PG — SIGNIFICANT CHANGE UP (ref 27–34)
MCHC RBC-ENTMCNC: 28.1 PG — SIGNIFICANT CHANGE UP (ref 27–34)
MCHC RBC-ENTMCNC: 30.8 GM/DL — LOW (ref 32–36)
MCHC RBC-ENTMCNC: 30.8 GM/DL — LOW (ref 32–36)
MCV RBC AUTO: 91.4 FL — SIGNIFICANT CHANGE UP (ref 80–100)
MCV RBC AUTO: 91.4 FL — SIGNIFICANT CHANGE UP (ref 80–100)
NRBC # BLD: 0 /100 WBCS — SIGNIFICANT CHANGE UP (ref 0–0)
NRBC # BLD: 0 /100 WBCS — SIGNIFICANT CHANGE UP (ref 0–0)
PHOSPHATE SERPL-MCNC: 2.8 MG/DL — SIGNIFICANT CHANGE UP (ref 2.5–4.5)
PHOSPHATE SERPL-MCNC: 2.8 MG/DL — SIGNIFICANT CHANGE UP (ref 2.5–4.5)
PLATELET # BLD AUTO: 183 K/UL — SIGNIFICANT CHANGE UP (ref 150–400)
PLATELET # BLD AUTO: 183 K/UL — SIGNIFICANT CHANGE UP (ref 150–400)
POTASSIUM SERPL-MCNC: 3.7 MMOL/L — SIGNIFICANT CHANGE UP (ref 3.5–5.3)
POTASSIUM SERPL-MCNC: 3.7 MMOL/L — SIGNIFICANT CHANGE UP (ref 3.5–5.3)
POTASSIUM SERPL-SCNC: 3.7 MMOL/L — SIGNIFICANT CHANGE UP (ref 3.5–5.3)
POTASSIUM SERPL-SCNC: 3.7 MMOL/L — SIGNIFICANT CHANGE UP (ref 3.5–5.3)
PROTHROM AB SERPL-ACNC: 26.1 SEC — HIGH (ref 9.5–13)
PROTHROM AB SERPL-ACNC: 26.1 SEC — HIGH (ref 9.5–13)
RBC # BLD: 4.41 M/UL — SIGNIFICANT CHANGE UP (ref 4.2–5.8)
RBC # BLD: 4.41 M/UL — SIGNIFICANT CHANGE UP (ref 4.2–5.8)
RBC # FLD: 13.7 % — SIGNIFICANT CHANGE UP (ref 10.3–14.5)
RBC # FLD: 13.7 % — SIGNIFICANT CHANGE UP (ref 10.3–14.5)
SODIUM SERPL-SCNC: 139 MMOL/L — SIGNIFICANT CHANGE UP (ref 135–145)
SODIUM SERPL-SCNC: 139 MMOL/L — SIGNIFICANT CHANGE UP (ref 135–145)
WBC # BLD: 7.39 K/UL — SIGNIFICANT CHANGE UP (ref 3.8–10.5)
WBC # BLD: 7.39 K/UL — SIGNIFICANT CHANGE UP (ref 3.8–10.5)
WBC # FLD AUTO: 7.39 K/UL — SIGNIFICANT CHANGE UP (ref 3.8–10.5)
WBC # FLD AUTO: 7.39 K/UL — SIGNIFICANT CHANGE UP (ref 3.8–10.5)

## 2023-11-02 PROCEDURE — 99233 SBSQ HOSP IP/OBS HIGH 50: CPT

## 2023-11-02 PROCEDURE — 99222 1ST HOSP IP/OBS MODERATE 55: CPT | Mod: GC

## 2023-11-02 PROCEDURE — 73219 MRI UPPER EXTREMITY W/DYE: CPT | Mod: 26,LT

## 2023-11-02 RX ORDER — VENLAFAXINE HCL 75 MG
75 CAPSULE, EXT RELEASE 24 HR ORAL DAILY
Refills: 0 | Status: DISCONTINUED | OUTPATIENT
Start: 2023-11-02 | End: 2023-11-02

## 2023-11-02 RX ORDER — OXYCODONE AND ACETAMINOPHEN 5; 325 MG/1; MG/1
1 TABLET ORAL EVERY 6 HOURS
Refills: 0 | Status: DISCONTINUED | OUTPATIENT
Start: 2023-11-02 | End: 2023-11-02

## 2023-11-02 RX ORDER — VENLAFAXINE HCL 75 MG
1 CAPSULE, EXT RELEASE 24 HR ORAL
Refills: 0 | DISCHARGE

## 2023-11-02 RX ORDER — WARFARIN SODIUM 2.5 MG/1
1 TABLET ORAL
Refills: 0 | DISCHARGE

## 2023-11-02 RX ORDER — DAPTOMYCIN 500 MG/10ML
350 INJECTION, POWDER, LYOPHILIZED, FOR SOLUTION INTRAVENOUS ONCE
Refills: 0 | Status: DISCONTINUED | OUTPATIENT
Start: 2023-11-02 | End: 2023-11-02

## 2023-11-02 RX ORDER — ONDANSETRON 8 MG/1
4 TABLET, FILM COATED ORAL ONCE
Refills: 0 | Status: DISCONTINUED | OUTPATIENT
Start: 2023-11-02 | End: 2023-11-02

## 2023-11-02 RX ORDER — HYDROXYCHLOROQUINE SULFATE 200 MG
200 TABLET ORAL
Refills: 0 | Status: DISCONTINUED | OUTPATIENT
Start: 2023-11-02 | End: 2023-11-02

## 2023-11-02 RX ORDER — LISINOPRIL 2.5 MG/1
1 TABLET ORAL
Refills: 0 | DISCHARGE

## 2023-11-02 RX ORDER — CEFAZOLIN SODIUM 1 G
2000 VIAL (EA) INJECTION EVERY 8 HOURS
Refills: 0 | Status: DISCONTINUED | OUTPATIENT
Start: 2023-11-02 | End: 2023-11-02

## 2023-11-02 RX ORDER — ACETAMINOPHEN 500 MG
975 TABLET ORAL EVERY 6 HOURS
Refills: 0 | Status: DISCONTINUED | OUTPATIENT
Start: 2023-11-02 | End: 2023-11-04

## 2023-11-02 RX ORDER — HYDROXYCHLOROQUINE SULFATE 200 MG
200 TABLET ORAL
Refills: 0 | Status: DISCONTINUED | OUTPATIENT
Start: 2023-11-02 | End: 2023-11-04

## 2023-11-02 RX ORDER — VENLAFAXINE HCL 75 MG
75 CAPSULE, EXT RELEASE 24 HR ORAL DAILY
Refills: 0 | Status: DISCONTINUED | OUTPATIENT
Start: 2023-11-02 | End: 2023-11-04

## 2023-11-02 RX ORDER — DAPTOMYCIN 500 MG/10ML
INJECTION, POWDER, LYOPHILIZED, FOR SOLUTION INTRAVENOUS
Refills: 0 | Status: DISCONTINUED | OUTPATIENT
Start: 2023-11-02 | End: 2023-11-02

## 2023-11-02 RX ORDER — CEFEPIME 1 G/1
2000 INJECTION, POWDER, FOR SOLUTION INTRAMUSCULAR; INTRAVENOUS EVERY 12 HOURS
Refills: 0 | Status: DISCONTINUED | OUTPATIENT
Start: 2023-11-02 | End: 2023-11-02

## 2023-11-02 RX ORDER — METOPROLOL TARTRATE 50 MG
1 TABLET ORAL
Refills: 0 | DISCHARGE

## 2023-11-02 RX ORDER — CEFEPIME 1 G/1
2000 INJECTION, POWDER, FOR SOLUTION INTRAMUSCULAR; INTRAVENOUS ONCE
Refills: 0 | Status: COMPLETED | OUTPATIENT
Start: 2023-11-02 | End: 2023-11-02

## 2023-11-02 RX ORDER — OXYCODONE HYDROCHLORIDE 5 MG/1
10 TABLET ORAL ONCE
Refills: 0 | Status: DISCONTINUED | OUTPATIENT
Start: 2023-11-02 | End: 2023-11-02

## 2023-11-02 RX ORDER — METOPROLOL TARTRATE 50 MG
200 TABLET ORAL DAILY
Refills: 0 | Status: DISCONTINUED | OUTPATIENT
Start: 2023-11-02 | End: 2023-11-04

## 2023-11-02 RX ORDER — METOPROLOL TARTRATE 50 MG
200 TABLET ORAL DAILY
Refills: 0 | Status: DISCONTINUED | OUTPATIENT
Start: 2023-11-02 | End: 2023-11-02

## 2023-11-02 RX ORDER — HYDROMORPHONE HYDROCHLORIDE 2 MG/ML
0.5 INJECTION INTRAMUSCULAR; INTRAVENOUS; SUBCUTANEOUS
Refills: 0 | Status: DISCONTINUED | OUTPATIENT
Start: 2023-11-02 | End: 2023-11-02

## 2023-11-02 RX ORDER — LISINOPRIL 2.5 MG/1
10 TABLET ORAL DAILY
Refills: 0 | Status: DISCONTINUED | OUTPATIENT
Start: 2023-11-02 | End: 2023-11-02

## 2023-11-02 RX ORDER — OXYCODONE HYDROCHLORIDE 5 MG/1
10 TABLET ORAL EVERY 6 HOURS
Refills: 0 | Status: DISCONTINUED | OUTPATIENT
Start: 2023-11-02 | End: 2023-11-04

## 2023-11-02 RX ORDER — OXYCODONE HYDROCHLORIDE 5 MG/1
5 TABLET ORAL ONCE
Refills: 0 | Status: DISCONTINUED | OUTPATIENT
Start: 2023-11-02 | End: 2023-11-02

## 2023-11-02 RX ORDER — SERTRALINE 25 MG/1
1 TABLET, FILM COATED ORAL
Qty: 0 | Refills: 0 | DISCHARGE

## 2023-11-02 RX ORDER — LISINOPRIL 2.5 MG/1
10 TABLET ORAL DAILY
Refills: 0 | Status: DISCONTINUED | OUTPATIENT
Start: 2023-11-02 | End: 2023-11-04

## 2023-11-02 RX ORDER — HYDROMORPHONE HYDROCHLORIDE 2 MG/ML
1 INJECTION INTRAMUSCULAR; INTRAVENOUS; SUBCUTANEOUS
Refills: 0 | Status: DISCONTINUED | OUTPATIENT
Start: 2023-11-02 | End: 2023-11-02

## 2023-11-02 RX ORDER — OXYCODONE HYDROCHLORIDE 5 MG/1
5 TABLET ORAL EVERY 6 HOURS
Refills: 0 | Status: DISCONTINUED | OUTPATIENT
Start: 2023-11-02 | End: 2023-11-04

## 2023-11-02 RX ORDER — CEFAZOLIN SODIUM 1 G
2000 VIAL (EA) INJECTION EVERY 8 HOURS
Refills: 0 | Status: DISCONTINUED | OUTPATIENT
Start: 2023-11-02 | End: 2023-11-04

## 2023-11-02 RX ORDER — ACETAMINOPHEN 500 MG
1000 TABLET ORAL ONCE
Refills: 0 | Status: COMPLETED | OUTPATIENT
Start: 2023-11-02 | End: 2023-11-02

## 2023-11-02 RX ORDER — ACETAMINOPHEN 500 MG
650 TABLET ORAL EVERY 6 HOURS
Refills: 0 | Status: DISCONTINUED | OUTPATIENT
Start: 2023-11-02 | End: 2023-11-02

## 2023-11-02 RX ORDER — CEFEPIME 1 G/1
INJECTION, POWDER, FOR SOLUTION INTRAMUSCULAR; INTRAVENOUS
Refills: 0 | Status: DISCONTINUED | OUTPATIENT
Start: 2023-11-02 | End: 2023-11-02

## 2023-11-02 RX ADMIN — CEFEPIME 100 MILLIGRAM(S): 1 INJECTION, POWDER, FOR SOLUTION INTRAMUSCULAR; INTRAVENOUS at 03:02

## 2023-11-02 RX ADMIN — Medication 75 MILLIGRAM(S): at 11:37

## 2023-11-02 RX ADMIN — Medication 200 MILLIGRAM(S): at 05:15

## 2023-11-02 RX ADMIN — Medication 100 MILLIGRAM(S): at 11:36

## 2023-11-02 RX ADMIN — Medication 100 MILLIGRAM(S): at 23:43

## 2023-11-02 RX ADMIN — Medication 1000 MILLIGRAM(S): at 21:35

## 2023-11-02 RX ADMIN — Medication 200 MILLIGRAM(S): at 17:16

## 2023-11-02 RX ADMIN — Medication 650 MILLIGRAM(S): at 07:05

## 2023-11-02 RX ADMIN — Medication 400 MILLIGRAM(S): at 21:20

## 2023-11-02 RX ADMIN — LISINOPRIL 10 MILLIGRAM(S): 2.5 TABLET ORAL at 05:15

## 2023-11-02 NOTE — PROGRESS NOTE ADULT - PROBLEM SELECTOR PLAN 3
as above  followed by heme Dr. Danielle Jett   Trend INR, will need heparin vs lovenox when INR < 2 based on AC recs post OR

## 2023-11-02 NOTE — PATIENT PROFILE ADULT - FALL HARM RISK - UNIVERSAL INTERVENTIONS
Bed in lowest position, wheels locked, appropriate side rails in place/Call bell, personal items and telephone in reach/Instruct patient to call for assistance before getting out of bed or chair/Non-slip footwear when patient is out of bed/[place] to call system/Physically safe environment - no spills, clutter or unnecessary equipment/Purposeful Proactive Rounding/Room/bathroom lighting operational, light cord in reach

## 2023-11-02 NOTE — CONSULT NOTE ADULT - ATTENDING COMMENTS
Infectious tenosynovitis in SLE patient  Hold MMF, continue HCQ  Resume MMF once off of antibiotics  Signing off     Dr. Samanta Smith  Rheumatology Attending  [phone]  [email or web address]

## 2023-11-02 NOTE — PROGRESS NOTE ADULT - PROBLEM SELECTOR PLAN 1
razor blade injury, resultant edema  eval'd by plastic surgery Dr. Kirkpatrick in ED c/f LD3 cellulitis, flexor tenosynovitis  ordered for unasyn, doxy by ED  - pt w/o systemic signs of toxicity, and no purulent drainage/exudate, though he is immunosuppressed will dose cefazolin to cover strep/MSSA . If decompensates would consider escalation to broader spectrum abx (dapto given vanc allergy, and cefepime) and narrow per Cx data (will obtain MRSA swab)   - w/ regard to tetanus ppx, pt states he has been vaccinated w/i past 5 yrs and that razor blade was free of rust, and used w/i past 2 wks, ED did not administer ppx, will hold (discussed w/ ID fellow)   - MRI L hand w/ flexor tenosynovitis with grade 2 strain/short segment partial-thickness interstitial tear of the   ulnar-sided band  - d/w Dr. Kirkpatrick today, plan for the OR after 5pm. Procedure overall with low bleeding risk, despite INR 2.44 agree with OR given worsening hand pain, benefit > risk at this time.

## 2023-11-02 NOTE — PROGRESS NOTE ADULT - PROBLEM SELECTOR PLAN 2
SLE/APLS/Raynauds (on chronic immunosuppression c/b cutaneous LE vasculitis and periungual infarcts, polyarthritis, nephritis, myopericarditis, RLE DVT/PE 2013 on AC, osteonecrosis hips/shoulders/knees, followed by rheum Dr. Mcdonald)  - Called rheum for consult  - will continue Plaquenil in the interm, hold cellcept for now

## 2023-11-02 NOTE — PROGRESS NOTE ADULT - SUBJECTIVE AND OBJECTIVE BOX
[facility] Division of Hospital Medicine  Marlinejannette Castillo DO   Available via Microsoft Teams      Patient is a 33y old  Male who presents with a chief complaint of LD3 cellulitis, possible flexor tenosynovitis (01 Nov 2023 23:30)      SUBJECTIVE / OVERNIGHT EVENTS:  Endorsing some worsening pain in the hand   Still inflamed     MEDICATIONS  (STANDING):  ceFAZolin   IVPB 2000 milliGRAM(s) IV Intermittent every 8 hours  lisinopril 10 milliGRAM(s) Oral daily  metoprolol succinate  milliGRAM(s) Oral daily  venlafaxine 75 milliGRAM(s) Oral daily    MEDICATIONS  (PRN):  acetaminophen     Tablet .. 650 milliGRAM(s) Oral every 6 hours PRN Temp greater or equal to 38C (100.4F), Mild Pain (1 - 3)  oxycodone    5 mG/acetaminophen 325 mG 1 Tablet(s) Oral every 6 hours PRN Severe Pain (7 - 10)      CAPILLARY BLOOD GLUCOSE        I&O's Summary      PHYSICAL EXAM:  Vital Signs Last 24 Hrs  T(C): 36.6 (02 Nov 2023 11:15), Max: 37.4 (02 Nov 2023 05:10)  T(F): 97.9 (02 Nov 2023 11:15), Max: 99.3 (02 Nov 2023 05:10)  HR: 79 (02 Nov 2023 11:15) (63 - 98)  BP: 128/78 (02 Nov 2023 11:15) (124/78 - 132/86)  BP(mean): --  RR: 18 (02 Nov 2023 11:15) (17 - 18)  SpO2: 97% (02 Nov 2023 11:15) (95% - 98%)    Parameters below as of 02 Nov 2023 11:15  Patient On (Oxygen Delivery Method): room air      CONSTITUTIONAL: NAD, well-developed, well-groomed  EYES: conjunctiva and sclera clear  ENMT: Moist oral mucosa  RESPIRATORY: Normal respiratory effort; lungs are clear to auscultation bilaterally  CARDIOVASCULAR: Regular rate and rhythm, normal S1 and S2; No lower extremity edema  ABDOMEN: Nontender to palpation, normoactive bowel sounds, no rebound/guarding  MUSCULOSKELETAL: no clubbing or cyanosis of digits; L hand tender, swollen, erythematous   PSYCH: A+O to person, place, and time; affect appropriate  NEUROLOGY: CN 2-12 are intact and symmetric; no gross sensory deficits   SKIN: No rashes; no palpable lesions    LABS:                        12.4   7.39  )-----------( 183      ( 02 Nov 2023 06:04 )             40.3     11-02    139  |  104  |  11  ----------------------------<  81  3.7   |  22  |  0.96    Ca    8.9      02 Nov 2023 06:04  Phos  2.8     11-02  Mg     1.8     11-02    TPro  7.3  /  Alb  4.4  /  TBili  0.4  /  DBili  x   /  AST  23  /  ALT  15  /  AlkPhos  80  11-01    PT/INR - ( 02 Nov 2023 06:04 )   PT: 26.1 sec;   INR: 2.44 ratio         PTT - ( 02 Nov 2023 06:04 )  PTT:46.1 sec      Urinalysis Basic - ( 02 Nov 2023 06:04 )    Color: x / Appearance: x / SG: x / pH: x  Gluc: 81 mg/dL / Ketone: x  / Bili: x / Urobili: x   Blood: x / Protein: x / Nitrite: x   Leuk Esterase: x / RBC: x / WBC x   Sq Epi: x / Non Sq Epi: x / Bacteria: x          RADIOLOGY & ADDITIONAL TESTS:  Results Reviewed:   Imaging Personally Reviewed:  Electrocardiogram Personally Reviewed:    COORDINATION OF CARE:  Care Discussed with Consultants/Other Providers [Y/N]: plastics, rheum   Prior or Outpatient Records Reviewed [Y/N]:

## 2023-11-02 NOTE — PROGRESS NOTE ADULT - ASSESSMENT
33M SLE/APLS/Raynauds (on chronic immunosuppression c/b cutaneous LE vasculitis and periungual infarcts, polyarthritis, nephritis, myopericarditis, RLE DVT/PE 2013 on AC, osteonecrosis hips/shoulders/knees, followed by rheum Dr. Mcdonald), HF w/ recovered EF (followed by cards Dr. Ventura) a/w L 3rd digit cellulitis, possible flexor tenosynovitis in context of laceration (razor blade injury while organizing shaving tools), pending MRI, plastic surgery Dr. Kirkpatrick on board

## 2023-11-02 NOTE — PATIENT PROFILE ADULT - DO YOU FEEL UNSAFE AT HOME, WORK, OR SCHOOL?
no Baseline Cr normal. On admission Cr 1.34 likely secondary to dehydration from decreased oral intake and ETOH abuse.   -s/p 1 L IVF in ER. Continue to monitor for signs of CHF  -Monitor renal function, BP, volume status, urine output, and electrolytes.

## 2023-11-03 ENCOUNTER — APPOINTMENT (OUTPATIENT)
Dept: UROLOGY | Facility: CLINIC | Age: 33
End: 2023-11-03

## 2023-11-03 LAB
ANION GAP SERPL CALC-SCNC: 16 MMOL/L — SIGNIFICANT CHANGE UP (ref 5–17)
ANION GAP SERPL CALC-SCNC: 16 MMOL/L — SIGNIFICANT CHANGE UP (ref 5–17)
APPEARANCE UR: CLEAR — SIGNIFICANT CHANGE UP
APPEARANCE UR: CLEAR — SIGNIFICANT CHANGE UP
APTT BLD: 33.3 SEC — SIGNIFICANT CHANGE UP (ref 24.5–35.6)
APTT BLD: 33.3 SEC — SIGNIFICANT CHANGE UP (ref 24.5–35.6)
BASOPHILS # BLD AUTO: 0.01 K/UL — SIGNIFICANT CHANGE UP (ref 0–0.2)
BASOPHILS # BLD AUTO: 0.01 K/UL — SIGNIFICANT CHANGE UP (ref 0–0.2)
BASOPHILS NFR BLD AUTO: 0.1 % — SIGNIFICANT CHANGE UP (ref 0–2)
BASOPHILS NFR BLD AUTO: 0.1 % — SIGNIFICANT CHANGE UP (ref 0–2)
BILIRUB UR-MCNC: NEGATIVE — SIGNIFICANT CHANGE UP
BILIRUB UR-MCNC: NEGATIVE — SIGNIFICANT CHANGE UP
BUN SERPL-MCNC: 17 MG/DL — SIGNIFICANT CHANGE UP (ref 7–23)
BUN SERPL-MCNC: 17 MG/DL — SIGNIFICANT CHANGE UP (ref 7–23)
CALCIUM SERPL-MCNC: 9.2 MG/DL — SIGNIFICANT CHANGE UP (ref 8.4–10.5)
CALCIUM SERPL-MCNC: 9.2 MG/DL — SIGNIFICANT CHANGE UP (ref 8.4–10.5)
CHLORIDE SERPL-SCNC: 98 MMOL/L — SIGNIFICANT CHANGE UP (ref 96–108)
CHLORIDE SERPL-SCNC: 98 MMOL/L — SIGNIFICANT CHANGE UP (ref 96–108)
CO2 SERPL-SCNC: 22 MMOL/L — SIGNIFICANT CHANGE UP (ref 22–31)
CO2 SERPL-SCNC: 22 MMOL/L — SIGNIFICANT CHANGE UP (ref 22–31)
COLOR SPEC: YELLOW — SIGNIFICANT CHANGE UP
COLOR SPEC: YELLOW — SIGNIFICANT CHANGE UP
CREAT SERPL-MCNC: 0.9 MG/DL — SIGNIFICANT CHANGE UP (ref 0.5–1.3)
CREAT SERPL-MCNC: 0.9 MG/DL — SIGNIFICANT CHANGE UP (ref 0.5–1.3)
DIFF PNL FLD: NEGATIVE — SIGNIFICANT CHANGE UP
DIFF PNL FLD: NEGATIVE — SIGNIFICANT CHANGE UP
EGFR: 116 ML/MIN/1.73M2 — SIGNIFICANT CHANGE UP
EGFR: 116 ML/MIN/1.73M2 — SIGNIFICANT CHANGE UP
EOSINOPHIL # BLD AUTO: 0 K/UL — SIGNIFICANT CHANGE UP (ref 0–0.5)
EOSINOPHIL # BLD AUTO: 0 K/UL — SIGNIFICANT CHANGE UP (ref 0–0.5)
EOSINOPHIL NFR BLD AUTO: 0 % — SIGNIFICANT CHANGE UP (ref 0–6)
EOSINOPHIL NFR BLD AUTO: 0 % — SIGNIFICANT CHANGE UP (ref 0–6)
GLUCOSE SERPL-MCNC: 106 MG/DL — HIGH (ref 70–99)
GLUCOSE SERPL-MCNC: 106 MG/DL — HIGH (ref 70–99)
GLUCOSE UR QL: NEGATIVE MG/DL — SIGNIFICANT CHANGE UP
GLUCOSE UR QL: NEGATIVE MG/DL — SIGNIFICANT CHANGE UP
HCT VFR BLD CALC: 38.9 % — LOW (ref 39–50)
HCT VFR BLD CALC: 38.9 % — LOW (ref 39–50)
HGB BLD-MCNC: 12.3 G/DL — LOW (ref 13–17)
HGB BLD-MCNC: 12.3 G/DL — LOW (ref 13–17)
IMM GRANULOCYTES NFR BLD AUTO: 0.1 % — SIGNIFICANT CHANGE UP (ref 0–0.9)
IMM GRANULOCYTES NFR BLD AUTO: 0.1 % — SIGNIFICANT CHANGE UP (ref 0–0.9)
INR BLD: 2.07 RATIO — HIGH (ref 0.85–1.18)
INR BLD: 2.07 RATIO — HIGH (ref 0.85–1.18)
KETONES UR-MCNC: ABNORMAL MG/DL
KETONES UR-MCNC: ABNORMAL MG/DL
LEUKOCYTE ESTERASE UR-ACNC: NEGATIVE — SIGNIFICANT CHANGE UP
LEUKOCYTE ESTERASE UR-ACNC: NEGATIVE — SIGNIFICANT CHANGE UP
LYMPHOCYTES # BLD AUTO: 0.79 K/UL — LOW (ref 1–3.3)
LYMPHOCYTES # BLD AUTO: 0.79 K/UL — LOW (ref 1–3.3)
LYMPHOCYTES # BLD AUTO: 11.3 % — LOW (ref 13–44)
LYMPHOCYTES # BLD AUTO: 11.3 % — LOW (ref 13–44)
MAGNESIUM SERPL-MCNC: 1.9 MG/DL — SIGNIFICANT CHANGE UP (ref 1.6–2.6)
MAGNESIUM SERPL-MCNC: 1.9 MG/DL — SIGNIFICANT CHANGE UP (ref 1.6–2.6)
MCHC RBC-ENTMCNC: 28.5 PG — SIGNIFICANT CHANGE UP (ref 27–34)
MCHC RBC-ENTMCNC: 28.5 PG — SIGNIFICANT CHANGE UP (ref 27–34)
MCHC RBC-ENTMCNC: 31.6 GM/DL — LOW (ref 32–36)
MCHC RBC-ENTMCNC: 31.6 GM/DL — LOW (ref 32–36)
MCV RBC AUTO: 90 FL — SIGNIFICANT CHANGE UP (ref 80–100)
MCV RBC AUTO: 90 FL — SIGNIFICANT CHANGE UP (ref 80–100)
MONOCYTES # BLD AUTO: 0.2 K/UL — SIGNIFICANT CHANGE UP (ref 0–0.9)
MONOCYTES # BLD AUTO: 0.2 K/UL — SIGNIFICANT CHANGE UP (ref 0–0.9)
MONOCYTES NFR BLD AUTO: 2.9 % — SIGNIFICANT CHANGE UP (ref 2–14)
MONOCYTES NFR BLD AUTO: 2.9 % — SIGNIFICANT CHANGE UP (ref 2–14)
NEUTROPHILS # BLD AUTO: 5.98 K/UL — SIGNIFICANT CHANGE UP (ref 1.8–7.4)
NEUTROPHILS # BLD AUTO: 5.98 K/UL — SIGNIFICANT CHANGE UP (ref 1.8–7.4)
NEUTROPHILS NFR BLD AUTO: 85.6 % — HIGH (ref 43–77)
NEUTROPHILS NFR BLD AUTO: 85.6 % — HIGH (ref 43–77)
NITRITE UR-MCNC: NEGATIVE — SIGNIFICANT CHANGE UP
NITRITE UR-MCNC: NEGATIVE — SIGNIFICANT CHANGE UP
NRBC # BLD: 0 /100 WBCS — SIGNIFICANT CHANGE UP (ref 0–0)
NRBC # BLD: 0 /100 WBCS — SIGNIFICANT CHANGE UP (ref 0–0)
PH UR: 6.5 — SIGNIFICANT CHANGE UP (ref 5–8)
PH UR: 6.5 — SIGNIFICANT CHANGE UP (ref 5–8)
PHOSPHATE SERPL-MCNC: 2.9 MG/DL — SIGNIFICANT CHANGE UP (ref 2.5–4.5)
PHOSPHATE SERPL-MCNC: 2.9 MG/DL — SIGNIFICANT CHANGE UP (ref 2.5–4.5)
PLATELET # BLD AUTO: 160 K/UL — SIGNIFICANT CHANGE UP (ref 150–400)
PLATELET # BLD AUTO: 160 K/UL — SIGNIFICANT CHANGE UP (ref 150–400)
POTASSIUM SERPL-MCNC: 3.9 MMOL/L — SIGNIFICANT CHANGE UP (ref 3.5–5.3)
POTASSIUM SERPL-MCNC: 3.9 MMOL/L — SIGNIFICANT CHANGE UP (ref 3.5–5.3)
POTASSIUM SERPL-SCNC: 3.9 MMOL/L — SIGNIFICANT CHANGE UP (ref 3.5–5.3)
POTASSIUM SERPL-SCNC: 3.9 MMOL/L — SIGNIFICANT CHANGE UP (ref 3.5–5.3)
PROT UR-MCNC: NEGATIVE MG/DL — SIGNIFICANT CHANGE UP
PROT UR-MCNC: NEGATIVE MG/DL — SIGNIFICANT CHANGE UP
PROTHROM AB SERPL-ACNC: 22.3 SEC — HIGH (ref 9.5–13)
PROTHROM AB SERPL-ACNC: 22.3 SEC — HIGH (ref 9.5–13)
RBC # BLD: 4.32 M/UL — SIGNIFICANT CHANGE UP (ref 4.2–5.8)
RBC # BLD: 4.32 M/UL — SIGNIFICANT CHANGE UP (ref 4.2–5.8)
RBC # FLD: 13.5 % — SIGNIFICANT CHANGE UP (ref 10.3–14.5)
RBC # FLD: 13.5 % — SIGNIFICANT CHANGE UP (ref 10.3–14.5)
SODIUM SERPL-SCNC: 136 MMOL/L — SIGNIFICANT CHANGE UP (ref 135–145)
SODIUM SERPL-SCNC: 136 MMOL/L — SIGNIFICANT CHANGE UP (ref 135–145)
SP GR SPEC: 1.01 — SIGNIFICANT CHANGE UP (ref 1–1.03)
SP GR SPEC: 1.01 — SIGNIFICANT CHANGE UP (ref 1–1.03)
UROBILINOGEN FLD QL: 0.2 MG/DL — SIGNIFICANT CHANGE UP (ref 0.2–1)
UROBILINOGEN FLD QL: 0.2 MG/DL — SIGNIFICANT CHANGE UP (ref 0.2–1)
WBC # BLD: 6.99 K/UL — SIGNIFICANT CHANGE UP (ref 3.8–10.5)
WBC # BLD: 6.99 K/UL — SIGNIFICANT CHANGE UP (ref 3.8–10.5)
WBC # FLD AUTO: 6.99 K/UL — SIGNIFICANT CHANGE UP (ref 3.8–10.5)
WBC # FLD AUTO: 6.99 K/UL — SIGNIFICANT CHANGE UP (ref 3.8–10.5)

## 2023-11-03 PROCEDURE — 99233 SBSQ HOSP IP/OBS HIGH 50: CPT | Mod: GC

## 2023-11-03 RX ORDER — WARFARIN SODIUM 2.5 MG/1
7.5 TABLET ORAL AT BEDTIME
Refills: 0 | Status: DISCONTINUED | OUTPATIENT
Start: 2023-11-03 | End: 2023-11-03

## 2023-11-03 RX ORDER — CEPHALEXIN 500 MG
1 CAPSULE ORAL
Qty: 42 | Refills: 0
Start: 2023-11-03 | End: 2023-11-16

## 2023-11-03 RX ORDER — CHLORHEXIDINE GLUCONATE 213 G/1000ML
1 SOLUTION TOPICAL DAILY
Refills: 0 | Status: DISCONTINUED | OUTPATIENT
Start: 2023-11-03 | End: 2023-11-04

## 2023-11-03 RX ORDER — WARFARIN SODIUM 2.5 MG/1
7.5 TABLET ORAL ONCE
Refills: 0 | Status: COMPLETED | OUTPATIENT
Start: 2023-11-03 | End: 2023-11-03

## 2023-11-03 RX ADMIN — Medication 975 MILLIGRAM(S): at 04:22

## 2023-11-03 RX ADMIN — Medication 200 MILLIGRAM(S): at 17:36

## 2023-11-03 RX ADMIN — WARFARIN SODIUM 7.5 MILLIGRAM(S): 2.5 TABLET ORAL at 22:10

## 2023-11-03 RX ADMIN — OXYCODONE HYDROCHLORIDE 10 MILLIGRAM(S): 5 TABLET ORAL at 00:45

## 2023-11-03 RX ADMIN — Medication 100 MILLIGRAM(S): at 07:05

## 2023-11-03 RX ADMIN — Medication 975 MILLIGRAM(S): at 05:00

## 2023-11-03 RX ADMIN — Medication 200 MILLIGRAM(S): at 06:30

## 2023-11-03 RX ADMIN — Medication 200 MILLIGRAM(S): at 04:34

## 2023-11-03 RX ADMIN — Medication 100 MILLIGRAM(S): at 13:07

## 2023-11-03 RX ADMIN — OXYCODONE HYDROCHLORIDE 10 MILLIGRAM(S): 5 TABLET ORAL at 00:00

## 2023-11-03 RX ADMIN — Medication 75 MILLIGRAM(S): at 12:58

## 2023-11-03 RX ADMIN — LISINOPRIL 10 MILLIGRAM(S): 2.5 TABLET ORAL at 04:34

## 2023-11-03 RX ADMIN — Medication 100 MILLIGRAM(S): at 22:09

## 2023-11-03 NOTE — PROGRESS NOTE ADULT - ASSESSMENT
33 yr M with pmh of SLE, APLAS, Raynauds (on chronic immunosuppression c/b cutaneous LE vasculitis and periungual infarcts, polyarthritis, nephritis, myopericarditis, RLE DVT/PE 2013 on AC, osteonecrosis hips/shoulders/knees, followed by rheum Dr. Mcdonald), HF w/ recovered EF (followed by cards Dr. Ventura) p/w L 3rd digit injury. Patient now s/p left middle finger tenosynovitis wash out.    Plan:  - Daily dressing changes with packing, gauze, and sebas  - Continue abx  - Rest of care per primary team    Plastic Surgery  [facility] [phone]

## 2023-11-03 NOTE — PROGRESS NOTE ADULT - PROBLEM SELECTOR PLAN 1
razor blade injury, resultant edema  eval'd by plastic surgery Dr. Kirkpatrick in ED c/f LD3 cellulitis, possible flexor tenosynovitis  ordered for unasyn, doxy by ED  personally contacted Dr. Kirkpatrick re abx recommendation w/o answer x 2  - pt w/o systemic signs of toxicity, and no purulent drainage/exudate, though he is immunosuppressed will dose cefazolin to cover strep/MSSA (discussed w/ ID fellow) however if decompensates would consider escalation to broader spectrum abx (dapto given vanc allergy, and cefepime) and narrow per Cx data (will obtain MRSA swab)   - w/ regard to tetanus ppx, pt states he has been vaccinated w/i past 5 yrs and that razor blade was free of rust, and used w/i past 2 wks, ED did not administer ppx, will hold (discussed w/ ID fellow)   - f/u  finger  - NPO ON, f/u further plastics recs in AM re potential washout razor blade injury, resultant edemaeval'd by plastic surgery Dr. Kirkpatrick in ED c/f LD3 cellulitis, possible flexor tenosynovitis, started with unasyn/dox, now placed on cefazolin, patient is non toxic appearing but immunocompromised  - f/u MR finger  - NPO ON, f/u further plastics recs in AM re potential washout  - continue cefazolin

## 2023-11-03 NOTE — CONSULT NOTE ADULT - ASSESSMENT
A 33-year-old male patient with a complex medical history, including systemic lupus erythematosus (SLE), antiphospholipid syndrome (APS), Raynaud's phenomenon, and various associated complications, such as cutaneous LE vasculitis, polyarthritis, nephritis, myopericarditis, and a prior deep vein thrombosis/pulmonary embolism in 2013 (managed with anticoagulation therapy), presented to the hospital due to an injury to his left 3rd digit.    #Regarding the patient's current admission with a 3rd finger injury:  Cellulitis of the 3rd right finger was diagnosed.  MRI results indicated mild left middle finger common flexor tenosynovitis with a grade 2 strain/short segment partial-thickness interstitial tear of the ulnar-sided band of the profundus slip at the level of the neck of the proximal phalanx.  The patient was on an antibiotic regimen.  This is likely secondary to infection and unlikely from his underlying SLE    #SLE  October 25, 2023, revealing stable renal function (P/Cr 0.1), decreased dsDNA levels (145 compared to the previous 312), and normal complement levels.  The patient is clinically asymptomatic     Plan:  Recommending the resumption of HCQ.  Advising against the need to check serology levels for SLE activity.  Suggesting discontinuation of MMF due to finger tenosynovitis and continuation of heparin.  Planning to discuss the case with Dr. Mcdonald for the patient's current admission.    Note is incomplete, please wait for attending attestation  DW Dr. Sarah Davidson MD  PGY-5 Rheumatology Fellow  Pager: [phone]   Available in teams 
33 yr M with pmh of SLE, APLAS, Raynauds (on chronic immunosuppression c/b cutaneous LE vasculitis and periungual infarcts, polyarthritis, nephritis, myopericarditis, RLE DVT/PE 2013 on AC, osteonecrosis hips/shoulders/knees, followed by rheum Dr. Mcdonald), HF w/ recovered EF (followed by cards Dr. Ventura) p/w L 3rd digit injury. Patient now s/p left middle finger tenosynovitis wash out.    hx DVT/PE  APLS  --under the care of Dr [facility] of [facility]  --diagnosed with RLE DVT/PE in 2008, has been on coumadin since then due to positive APLS.     --recent repeat APLS panel shows no anti­cardiolipin, lupus anticoagulant, or B2 glycoprotein ab   --remains on coumadin 7.5 QD, d/t increased risk for DVT/PE from SLE  --ongoing care after discharge    hx of lupus nephritis   --w/ stable disease on Benlysta  --follows w/ Dr Mcdonald     finger tenosynovitis  --noted on MR of L hand  --Plastic surgery following  --s/p incision and washout  --on abx    will follow,    Rubi Carter NP  Hematology/ Oncology  [facility]  [phone] (office)  [phone] (alt office)  Evenings and weekends please call MD on call or office

## 2023-11-03 NOTE — CONSULT NOTE ADULT - NS ATTEND AMEND GEN_ALL_CORE FT
Await plastic surgery recommendations regarding plan of care.  For now, would continue with warfarin, dose to maintain INR 2-3.   His last few APLS labs have been negative and his last thrombosis was in 2008. Will need to d/w rheumatology the need to remain on anticoagulation.   He is currently on ancef, cellcept is on hold.    d/w Dr. Locke.

## 2023-11-03 NOTE — PROGRESS NOTE ADULT - ATTENDING COMMENTS
Patient is seen and evaluated on bedside rounds. no pain in hand. No fevers, chills. Please see conversation noted above.     PE:  Left hand bandaged.     LAbs:  INR 2.07  BCX: NGTD    #infectious tenosynovitis  -s/p I&D  -plastics apprecaited for wound care  -continue with IV Abx  -follow up wound cultures, bcx negative  -if cannot stay, will send on Keflex and advised to return to hospital once takes care of dog.    #APLS  -dose coumadin 7.5mg 11/3  -goal INR 2-3    #lupus  -hold cellcept, continue plaquenil    #HFrEF, chronic  -continue beta blocker, ACE-i  -outpatient follow up with heart failure team regarding entresto. Patient is seen and evaluated on bedside rounds. no pain in hand. No fevers, chills. Please see conversation noted above.     PE:  Left hand bandaged.     LAbs:  INR 2.07  BCX: NGTD    #infectious tenosynovitis  -s/p I&D  -plastics apprecaited for wound care  -continue with IV Abx  -follow up wound cultures, bcx negative  -if cannot stay, will send on Keflex and advised to return to hospital once takes care of dog.    #APLS  -dose coumadin 7.5mg 11/3  -goal INR 2-3    #lupus  -hold cellcept, continue plaquenil    #HFrEF, chronic  -continue beta blocker, ACE-i  -outpatient follow up with heart failure team regarding entresto.    rest as above

## 2023-11-03 NOTE — PROGRESS NOTE ADULT - SUBJECTIVE AND OBJECTIVE BOX
PLASTIC SURGERY DAILY PROGRESS NOTE    SUBJECTIVE: No acute events overnight. Patient seen and evaluated on AM rounds. Patient reporting some mild improvement in L hand pain and swelling. Denies fever, chills.    OBJECTIVE:  Vital Signs Last 24 Hrs  T(C): 36.4 (03 Nov 2023 04:37), Max: 37 (02 Nov 2023 10:05)  T(F): 97.6 (03 Nov 2023 04:37), Max: 98.6 (02 Nov 2023 10:05)  HR: 68 (03 Nov 2023 06:35) (68 - 87)  BP: 112/70 (03 Nov 2023 06:35) (112/70 - 137/86)  BP(mean): 106 (02 Nov 2023 22:00) (97 - 112)  RR: 18 (03 Nov 2023 04:37) (15 - 19)  SpO2: 100% (03 Nov 2023 04:37) (96% - 100%)    Parameters below as of 03 Nov 2023 04:37  Patient On (Oxygen Delivery Method): room air      I&O's Detail    Daily Height in cm: 172.72 (02 Nov 2023 19:39)    Daily   MEDICATIONS  (STANDING):  ceFAZolin   IVPB 2000 milliGRAM(s) IV Intermittent every 8 hours  hydroxychloroquine 200 milliGRAM(s) Oral two times a day  lisinopril 10 milliGRAM(s) Oral daily  metoprolol succinate  milliGRAM(s) Oral daily  venlafaxine 75 milliGRAM(s) Oral daily    MEDICATIONS  (PRN):  acetaminophen     Tablet .. 975 milliGRAM(s) Oral every 6 hours PRN Temp greater or equal to 38C (100.4F), Mild Pain (1 - 3)  oxyCODONE    IR 5 milliGRAM(s) Oral every 6 hours PRN Moderate Pain (4 - 6)  oxyCODONE    IR 10 milliGRAM(s) Oral every 6 hours PRN Severe Pain (7 - 10)      LABS:                        12.4   7.39  )-----------( 183      ( 02 Nov 2023 06:04 )             40.3     11-02    139  |  104  |  11  ----------------------------<  81  3.7   |  22  |  0.96    Ca    8.9      02 Nov 2023 06:04  Phos  2.8     11-02  Mg     1.8     11-02    TPro  7.3  /  Alb  4.4  /  TBili  0.4  /  DBili  x   /  AST  23  /  ALT  15  /  AlkPhos  80  11-01    PT/INR - ( 02 Nov 2023 06:04 )   PT: 26.1 sec;   INR: 2.44 ratio         PTT - ( 02 Nov 2023 06:04 )  PTT:46.1 sec  Urinalysis Basic - ( 02 Nov 2023 06:04 )    Color: x / Appearance: x / SG: x / pH: x  Gluc: 81 mg/dL / Ketone: x  / Bili: x / Urobili: x   Blood: x / Protein: x / Nitrite: x   Leuk Esterase: x / RBC: x / WBC x   Sq Epi: x / Non Sq Epi: x / Bacteria: x      PHYSICAL EXAM:  Constitutional: No acute disress  Pulmonary: Symmetric chest rise bilaterally, no increased WOB  Gastrointestinal: Abdomen soft, nontender, nondistended  Extremities: L middle finger soft, swollen, tender, open surgical incisions c/d/i, no purulence from the wound site, packed with guaze, wrapped in gauze and sebas

## 2023-11-03 NOTE — PROGRESS NOTE ADULT - PROBLEM SELECTOR PLAN 3
as above  followed by heme Dr. Danielle Jett   - f/u [facility]a[place] in AM, consider coumadin dose (last dose night PTA) pending plastic recs re OR for washout

## 2023-11-03 NOTE — PROGRESS NOTE ADULT - SUBJECTIVE AND OBJECTIVE BOX
INCOMPLETE NOTE    CHIEF COMPLAINT:    Interval Events:    REVIEW OF SYSTEMS:  CONSTITUTIONAL: No weakness, fevers or chills  EYES/ENT: No visual changes;  No vertigo or throat pain   NECK: No pain or stiffness  RESPIRATORY: No cough, wheezing, hemoptysis; No shortness of breath  CARDIOVASCULAR: No chest pain or palpitations  GASTROINTESTINAL: No abdominal or epigastric pain. No nausea, vomiting, or hematemesis; No diarrhea or constipation. No melena or hematochezia.  GENITOURINARY: No dysuria, frequency or hematuria  NEUROLOGICAL: No numbness or weakness  SKIN: No itching, burning, rashes, or lesions   All other review of systems is negative unless indicated above.    OBJECTIVE:  ICU Vital Signs Last 24 Hrs  T(C): 36.4 (03 Nov 2023 04:37), Max: 37 (02 Nov 2023 10:05)  T(F): 97.6 (03 Nov 2023 04:37), Max: 98.6 (02 Nov 2023 10:05)  HR: 69 (03 Nov 2023 04:37) (69 - 87)  BP: 116/75 (03 Nov 2023 04:37) (116/75 - 137/86)  BP(mean): 106 (02 Nov 2023 22:00) (97 - 112)  ABP: --  ABP(mean): --  RR: 18 (03 Nov 2023 04:37) (15 - 19)  SpO2: 100% (03 Nov 2023 04:37) (96% - 100%)    O2 Parameters below as of 03 Nov 2023 04:37  Patient On (Oxygen Delivery Method): room air              CAPILLARY BLOOD GLUCOSE          PHYSICAL EXAM:  General: WN/WD NAD  Neurology: A&Ox3, nonfocal, PATEL x 4  Eyes: PERRLA/ EOMI, Gross vision intact  ENT/Neck: Neck supple, trachea midline, No JVD, Gross hearing intact  Respiratory: CTA B/L, No wheezing, rales, rhonchi  CV: RRR, +S1/S2, -S3/S4, no murmurs, rubs or gallops  Abdominal: Soft, NT, ND +BS, No HSM  MSK: 5/5 strength UE/LE bilaterally  Extremities: No edema, 2+ peripheral pulses  Skin: No Rashes, Hematoma, Ecchymosis  Incisions:   Tubes:    HOSPITAL MEDICATIONS:  MEDICATIONS  (STANDING):  ceFAZolin   IVPB 2000 milliGRAM(s) IV Intermittent every 8 hours  hydroxychloroquine 200 milliGRAM(s) Oral two times a day  lisinopril 10 milliGRAM(s) Oral daily  metoprolol succinate  milliGRAM(s) Oral daily  venlafaxine 75 milliGRAM(s) Oral daily    MEDICATIONS  (PRN):  acetaminophen     Tablet .. 975 milliGRAM(s) Oral every 6 hours PRN Temp greater or equal to 38C (100.4F), Mild Pain (1 - 3)  oxyCODONE    IR 5 milliGRAM(s) Oral every 6 hours PRN Moderate Pain (4 - 6)  oxyCODONE    IR 10 milliGRAM(s) Oral every 6 hours PRN Severe Pain (7 - 10)      LABS:                        12.4   7.39  )-----------( 183      ( 02 Nov 2023 06:04 )             40.3     Hgb Trend: 12.4<--, 12.6<--  11-02    139  |  104  |  11  ----------------------------<  81  3.7   |  22  |  0.96    Ca    8.9      02 Nov 2023 06:04  Phos  2.8     11-02  Mg     1.8     11-02    TPro  7.3  /  Alb  4.4  /  TBili  0.4  /  DBili  x   /  AST  23  /  ALT  15  /  AlkPhos  80  11-01    Creatinine Trend: 0.96<--, 1.08<--  PT/INR - ( 02 Nov 2023 06:04 )   PT: 26.1 sec;   INR: 2.44 ratio         PTT - ( 02 Nov 2023 06:04 )  PTT:46.1 sec  Urinalysis Basic - ( 02 Nov 2023 06:04 )    Color: x / Appearance: x / SG: x / pH: x  Gluc: 81 mg/dL / Ketone: x  / Bili: x / Urobili: x   Blood: x / Protein: x / Nitrite: x   Leuk Esterase: x / RBC: x / WBC x   Sq Epi: x / Non Sq Epi: x / Bacteria: x            MICROBIOLOGY:     Culture - Blood (collected 01 Nov 2023 20:17)  Source: .Blood Blood-Peripheral  Preliminary Report (03 Nov 2023 01:02):    No growth at 24 hours    Culture - Blood (collected 01 Nov 2023 20:10)  Source: .Blood Blood-Peripheral  Preliminary Report (03 Nov 2023 01:02):    No growth at 24 hours        RADIOLOGY:  [ ] Reviewed by me   CHIEF COMPLAINT:    Interval Events:    REVIEW OF SYSTEMS:  CONSTITUTIONAL: No weakness, fevers or chills  EYES/ENT: No visual changes;  No vertigo or throat pain   NECK: No pain or stiffness  RESPIRATORY: No cough, wheezing, hemoptysis; No shortness of breath  CARDIOVASCULAR: No chest pain or palpitations  GASTROINTESTINAL: No abdominal or epigastric pain. No nausea, vomiting, or hematemesis; No diarrhea or constipation. No melena or hematochezia.  GENITOURINARY: No dysuria, frequency or hematuria  NEUROLOGICAL: No numbness or weakness  SKIN: No itching, burning, rashes, or lesions   All other review of systems is negative unless indicated above.    OBJECTIVE:  ICU Vital Signs Last 24 Hrs  T(C): 36.4 (03 Nov 2023 04:37), Max: 37 (02 Nov 2023 10:05)  T(F): 97.6 (03 Nov 2023 04:37), Max: 98.6 (02 Nov 2023 10:05)  HR: 69 (03 Nov 2023 04:37) (69 - 87)  BP: 116/75 (03 Nov 2023 04:37) (116/75 - 137/86)  BP(mean): 106 (02 Nov 2023 22:00) (97 - 112)  ABP: --  ABP(mean): --  RR: 18 (03 Nov 2023 04:37) (15 - 19)  SpO2: 100% (03 Nov 2023 04:37) (96% - 100%)    O2 Parameters below as of 03 Nov 2023 04:37  Patient On (Oxygen Delivery Method): room air              CAPILLARY BLOOD GLUCOSE          PHYSICAL EXAM:  General: WN/WD NAD  Neurology: A&Ox3, nonfocal, PATEL x 4  Eyes: PERRLA/ EOMI, Gross vision intact  ENT/Neck: Neck supple, trachea midline, No JVD, Gross hearing intact  Respiratory: CTA B/L, No wheezing, rales, rhonchi  CV: RRR, +S1/S2, -S3/S4, no murmurs, rubs or gallops  Abdominal: Soft, NT, ND +BS, No HSM  MSK: 5/5 strength UE/LE bilaterally  Extremities: No edema, 2+ peripheral pulses  Skin: No Rashes, Hematoma, Ecchymosis  Incisions:   Tubes:    HOSPITAL MEDICATIONS:  MEDICATIONS  (STANDING):  ceFAZolin   IVPB 2000 milliGRAM(s) IV Intermittent every 8 hours  hydroxychloroquine 200 milliGRAM(s) Oral two times a day  lisinopril 10 milliGRAM(s) Oral daily  metoprolol succinate  milliGRAM(s) Oral daily  venlafaxine 75 milliGRAM(s) Oral daily    MEDICATIONS  (PRN):  acetaminophen     Tablet .. 975 milliGRAM(s) Oral every 6 hours PRN Temp greater or equal to 38C (100.4F), Mild Pain (1 - 3)  oxyCODONE    IR 5 milliGRAM(s) Oral every 6 hours PRN Moderate Pain (4 - 6)  oxyCODONE    IR 10 milliGRAM(s) Oral every 6 hours PRN Severe Pain (7 - 10)      LABS:                        12.4   7.39  )-----------( 183      ( 02 Nov 2023 06:04 )             40.3     Hgb Trend: 12.4<--, 12.6<--  11-02    139  |  104  |  11  ----------------------------<  81  3.7   |  22  |  0.96    Ca    8.9      02 Nov 2023 06:04  Phos  2.8     11-02  Mg     1.8     11-02    TPro  7.3  /  Alb  4.4  /  TBili  0.4  /  DBili  x   /  AST  23  /  ALT  15  /  AlkPhos  80  11-01    Creatinine Trend: 0.96<--, 1.08<--  PT/INR - ( 02 Nov 2023 06:04 )   PT: 26.1 sec;   INR: 2.44 ratio         PTT - ( 02 Nov 2023 06:04 )  PTT:46.1 sec  Urinalysis Basic - ( 02 Nov 2023 06:04 )    Color: x / Appearance: x / SG: x / pH: x  Gluc: 81 mg/dL / Ketone: x  / Bili: x / Urobili: x   Blood: x / Protein: x / Nitrite: x   Leuk Esterase: x / RBC: x / WBC x   Sq Epi: x / Non Sq Epi: x / Bacteria: x            MICROBIOLOGY:     Culture - Blood (collected 01 Nov 2023 20:17)  Source: .Blood Blood-Peripheral  Preliminary Report (03 Nov 2023 01:02):    No growth at 24 hours    Culture - Blood (collected 01 Nov 2023 20:10)  Source: .Blood Blood-Peripheral  Preliminary Report (03 Nov 2023 01:02):    No growth at 24 hours        RADIOLOGY:  [ ] Reviewed by me   CHIEF COMPLAINT: Left arm swelling/wound    Interval Events: Patient reported having the I&D procedure done overnight. He did not complain of any post operative abhorrent changes. Patient reported some pain after wrapping change in the am. Patient also reported that he had an out of hospital emergency involving his dog and he needed to leave to take care of those matters.    We had a discussion with the patient and the importance of staying in the hospital due to his open wound and that it was recommended both by primary and plastics team. Patient understood our concerns and the importanve of his stay. He stated that he would try and find someone to take care of his emergency In the case that he is unable to find someone he would have to sign out ama. In that case, we recommended that he returns to the medical center after he solves his concern and ideally would want to be seen sometime tonight.     REVIEW OF SYSTEMS:  CONSTITUTIONAL: No weakness, fevers or chills  RESPIRATORY: No cough, wheezing, hemoptysis; No shortness of breath  CARDIOVASCULAR: No chest pain or palpitations  GASTROINTESTINAL: No abdominal or epigastric pain. No nausea, vomiting, or hematemesis; No diarrhea or constipation. No melena or hematochezia.  GENITOURINARY: No dysuria, frequency or hematuria  NEUROLOGICAL: No numbness or weakness  SKIN: left hand swelling and tenderness   All other review of systems is negative unless indicated above.    OBJECTIVE:  ICU Vital Signs Last 24 Hrs  T(C): 36.4 (03 Nov 2023 04:37), Max: 37 (02 Nov 2023 10:05)  T(F): 97.6 (03 Nov 2023 04:37), Max: 98.6 (02 Nov 2023 10:05)  HR: 69 (03 Nov 2023 04:37) (69 - 87)  BP: 116/75 (03 Nov 2023 04:37) (116/75 - 137/86)  BP(mean): 106 (02 Nov 2023 22:00) (97 - 112)  ABP: --  ABP(mean): --  RR: 18 (03 Nov 2023 04:37) (15 - 19)  SpO2: 100% (03 Nov 2023 04:37) (96% - 100%)    O2 Parameters below as of 03 Nov 2023 04:37  Patient On (Oxygen Delivery Method): room air              CAPILLARY BLOOD GLUCOSE          PHYSICAL EXAM:  General: WN/WD NAD  Neurology: A&Ox3  Respiratory: CTA B/L, No wheezing, rales, rhonchi  CV: RRR, +S1/S2, -S3/S4, no murmurs, rubs or gallops  Abdominal: Soft, NT, ND +BS, No HSMy  Extremities: Left hand post operative changes. no out of perforation tenderness  Skin: No Rashes, Hematoma, Ecchymosis  Incisions:   Tubes:    HOSPITAL MEDICATIONS:  MEDICATIONS  (STANDING):  ceFAZolin   IVPB 2000 milliGRAM(s) IV Intermittent every 8 hours  hydroxychloroquine 200 milliGRAM(s) Oral two times a day  lisinopril 10 milliGRAM(s) Oral daily  metoprolol succinate  milliGRAM(s) Oral daily  venlafaxine 75 milliGRAM(s) Oral daily    MEDICATIONS  (PRN):  acetaminophen     Tablet .. 975 milliGRAM(s) Oral every 6 hours PRN Temp greater or equal to 38C (100.4F), Mild Pain (1 - 3)  oxyCODONE    IR 5 milliGRAM(s) Oral every 6 hours PRN Moderate Pain (4 - 6)  oxyCODONE    IR 10 milliGRAM(s) Oral every 6 hours PRN Severe Pain (7 - 10)      LABS:                        12.4   7.39  )-----------( 183      ( 02 Nov 2023 06:04 )             40.3     Hgb Trend: 12.4<--, 12.6<--  11-02    139  |  104  |  11  ----------------------------<  81  3.7   |  22  |  0.96    Ca    8.9      02 Nov 2023 06:04  Phos  2.8     11-02  Mg     1.8     11-02    TPro  7.3  /  Alb  4.4  /  TBili  0.4  /  DBili  x   /  AST  23  /  ALT  15  /  AlkPhos  80  11-01    Creatinine Trend: 0.96<--, 1.08<--  PT/INR - ( 02 Nov 2023 06:04 )   PT: 26.1 sec;   INR: 2.44 ratio         PTT - ( 02 Nov 2023 06:04 )  PTT:46.1 sec  Urinalysis Basic - ( 02 Nov 2023 06:04 )    Color: x / Appearance: x / SG: x / pH: x  Gluc: 81 mg/dL / Ketone: x  / Bili: x / Urobili: x   Blood: x / Protein: x / Nitrite: x   Leuk Esterase: x / RBC: x / WBC x   Sq Epi: x / Non Sq Epi: x / Bacteria: x            MICROBIOLOGY:     Culture - Blood (collected 01 Nov 2023 20:17)  Source: .Blood Blood-Peripheral  Preliminary Report (03 Nov 2023 01:02):    No growth at 24 hours    Culture - Blood (collected 01 Nov 2023 20:10)  Source: .Blood Blood-Peripheral  Preliminary Report (03 Nov 2023 01:02):    No growth at 24 hours        RADIOLOGY:  [ ] Reviewed by me

## 2023-11-03 NOTE — CONSULT NOTE ADULT - SUBJECTIVE AND OBJECTIVE BOX
TODD BENNETT  [id number]    HISTORY OF PRESENT ILLNESS:    A 33-year-old male patient with a complex medical history, including systemic lupus erythematosus (SLE), antiphospholipid syndrome (APS), Raynaud's phenomenon, and various associated complications, such as cutaneous LE vasculitis, polyarthritis, nephritis, myopericarditis, and a prior deep vein thrombosis/pulmonary embolism in 2013 (managed with anticoagulation therapy), presented to the hospital due to an injury to his left 3rd digit.    Upon evaluation in the Emergency Department (ED):  Vital signs on admission were within normal limits. Laboratory tests showed low relative lymphocyte levels and an elevated INR of 2.5. Imaging studies, including X-ray of the left fingers, did not reveal any acute fractures. An MRI of the hand revealed mild left middle finger common flexor tenosynovitis with a grade 2 strain/short segment partial-thickness interstitial tear of the ulnar-sided band of the profundus slip at the level of the neck of the proximal phalanx. The patient was assessed by Plastic Surgery consultant Dr. Jonathan Kirkpatrick at the bedside, who noted cellulitis affecting the left 3rd digit with potential concern for flexor tenosynovitis. The patient was instructed to remain NPO (nothing by mouth) from midnight and received intravenous unasyn (3g) and oral doxycycline (100mg) as part of his initial treatment. Subsequently, his antibiotic was changed to cefazolin.      Consultation day on November 1, 2023:  The patient reported that the injury occurred on October 29th while he was organizing a razor blade, typically used for shaving. He experienced a minor injury resulting in bleeding, which he managed by applying pressure to achieve hemostasis. However, he noticed swelling and pain in the area the following morning, leading him to seek care in urgent care, and he was subsequently referred to the emergency department. The patient mentioned having a recent visit with Dr. Mcdonald, during which his disease activity was deemed stable.    Review of systems revealed:  Positive findings: left 3rd finger swelling, limited range of motion, redness, warmth, and Raynaud's phenomenon.  Negative findings: rash, other joint pain, alopecia, mouth ulcers, foamy urine, and hematuria.  Additional details related to his medical history:    #SLE diagnosis in 2007, managed by Dr. Mcdonald.  Serological findings included positive dsDNA, hypocomplementemia, anti-Smith, RNP, and SSA antibodies.  Clinical manifestations consisted of malar rash, polyarthritis, rheumatoid factor (RF), alopecia, and lupus nephritis (LN).  Current treatment regimen included mycophenolate mofetil (MMF) 1.5 mg twice daily, hydroxychloroquine (HCQ) 400 mg daily, intravenous belimumab (Benlysta) infusions, and lisinopril.  His last office visit was on October 25, 2023, revealing stable renal function (P/Cr 0.1), decreased dsDNA levels (145 compared to the previous 312), and normal complement levels.  The patient was clinically asymptomatic.      PAST MEDICAL & SURGICAL HISTORY:  Lupus      DVT (deep venous thrombosis)      Myocarditis      Systemic lupus      No significant past surgical history          Review of Systems:  See H&P    MEDICATIONS  (STANDING):  ceFAZolin   IVPB 2000 milliGRAM(s) IV Intermittent every 8 hours  hydroxychloroquine 200 milliGRAM(s) Oral two times a day  lisinopril 10 milliGRAM(s) Oral daily  metoprolol succinate  milliGRAM(s) Oral daily  venlafaxine 75 milliGRAM(s) Oral daily    MEDICATIONS  (PRN):  acetaminophen     Tablet .. 650 milliGRAM(s) Oral every 6 hours PRN Temp greater or equal to 38C (100.4F), Mild Pain (1 - 3)  oxycodone    5 mG/acetaminophen 325 mG 1 Tablet(s) Oral every 6 hours PRN Severe Pain (7 - 10)      Allergies    Vancomycin Hydrochloride (Pruritus; Rash; Hives)    Intolerances        PERTINENT MEDICATION HISTORY:    SOCIAL HISTORY:  OCCUPATION:  TRAVEL HISTORY:    FAMILY HISTORY:  Family history of systemic lupus erythematosus (SLE) in mother (Mother)    Family history of inclusion body myositis (Father)        Vital Signs Last 24 Hrs  T(C): 36.6 (02 Nov 2023 11:15), Max: 37.4 (02 Nov 2023 05:10)  T(F): 97.9 (02 Nov 2023 11:15), Max: 99.3 (02 Nov 2023 05:10)  HR: 71 (02 Nov 2023 15:08) (63 - 98)  BP: 128/78 (02 Nov 2023 15:08) (124/78 - 132/86)  BP(mean): --  RR: 18 (02 Nov 2023 15:08) (17 - 18)  SpO2: 97% (02 Nov 2023 11:15) (95% - 98%)    Parameters below as of 02 Nov 2023 11:15  Patient On (Oxygen Delivery Method): room air        Physical Exam:  General: No apparent distress  HEENT: EOMI, MMM, no dentition   CVS: +S1/S2, RRR, no murmurs/rubs/gallops  Resp: CTA b/l. No crackles/wheezing  GI: Soft, NT/ND +BS  MSK: R 3rd finger swelling and tenderness, difficulty to flex   Neuro: AAOx3  Skin: no visible rashes    LABS:                        12.4   7.39  )-----------( 183      ( 02 Nov 2023 06:04 )             40.3     11-02    139  |  104  |  11  ----------------------------<  81  3.7   |  22  |  0.96    Ca    8.9      02 Nov 2023 06:04  Phos  2.8     11-02  Mg     1.8     11-02    TPro  7.3  /  Alb  4.4  /  TBili  0.4  /  DBili  x   /  AST  23  /  ALT  15  /  AlkPhos  80  11-01    PT/INR - ( 02 Nov 2023 06:04 )   PT: 26.1 sec;   INR: 2.44 ratio         PTT - ( 02 Nov 2023 06:04 )  PTT:46.1 sec  Urinalysis Basic - ( 02 Nov 2023 06:04 )    Color: x / Appearance: x / SG: x / pH: x  Gluc: 81 mg/dL / Ketone: x  / Bili: x / Urobili: x   Blood: x / Protein: x / Nitrite: x   Leuk Esterase: x / RBC: x / WBC x   Sq Epi: x / Non Sq Epi: x / Bacteria: x        RADIOLOGY & ADDITIONAL STUDIES:    < from: MR Hand w/ IV Cont, Left (11.02.23 @ 08:10) >  Mild left middle finger common flexor tenosynovitis with grade 2   strain/short segment partial-thickness interstitial tear of the   ulnar-sided band of the profundus slip at the level of the neck of the   proximal phalanx. Flexor mechanism is otherwise intact. No bowstringing.    < end of copied text >  
HPI:  33M SLE/APS/Raynauds (on chronic immunosuppression c/b cutaneous LE vasculitis and periungual infarcts, polyarthritis, nephritis, myopericarditis, RLE DVT/PE 2013 on AC, osteonecrosis hips/shoulders/knees, followed by rheum Dr. Mcdonald), HF w/ recovered EF (followed by cards Dr. Ventura) p/w L 3rd digit injury. Pt relays  health when 10/29 experienced injury to L 3rd digit while organizing razor blade (uses double edged razor blade w/ safety razor to shave face) w/ resulting bleed which he applied pressure to w/ successful hemostasis. This AM he noticed swelling in area, in addition to pain, prompting UC presentation, who referred him to ED. Otherwise not endorsing complaints.     VS: afebrile tmax 37.1C, HR 60s-90s, BP 120s-130s/70s-80s, RR 17-18, SpO2% 95-97 RA   Labs: normocytic (MCV 91.1) anemia H/H 12.6/39.8; INR prolonged 2.56;   Micro: unavailable; BCx x 2 in lab  Imaging:   - XR L fingers: no acute fractures   Received: unasyn 3g IV, doxy 100mg PO    Plastic surgery Dr. Jonathan Kirkpatrick c/s by ED, per chart note he evaluated pt at bedside, though pending full c/s note, eval LD3 cellulitis, possible flexor tenosynovitis, may require operative washout, recs abx, MRI, NPO past MN.     Hematology/Oncology called to see patient who follows with Dr Jett for care of underlying APLS, and history of DVT/PE      PAST MEDICAL & SURGICAL HISTORY:  Lupus      DVT (deep venous thrombosis)      Myocarditis      Systemic lupus      No significant past surgical history          FAMILY HISTORY:  Family history of systemic lupus erythematosus (SLE) in mother (Mother)    Family history of inclusion body myositis (Father)        Alochol: Denied  Smoking: Nonsmoker  Drug Use: Denied  Marital Status:         Allergies    Vancomycin Hydrochloride (Pruritus; Rash; Hives)    Intolerances        MEDICATIONS  (STANDING):  ceFAZolin   IVPB 2000 milliGRAM(s) IV Intermittent every 8 hours  chlorhexidine 4% Liquid 1 Application(s) Topical daily  hydroxychloroquine 200 milliGRAM(s) Oral two times a day  lisinopril 10 milliGRAM(s) Oral daily  metoprolol succinate  milliGRAM(s) Oral daily  venlafaxine 75 milliGRAM(s) Oral daily    MEDICATIONS  (PRN):  acetaminophen     Tablet .. 975 milliGRAM(s) Oral every 6 hours PRN Temp greater or equal to 38C (100.4F), Mild Pain (1 - 3)  oxyCODONE    IR 5 milliGRAM(s) Oral every 6 hours PRN Moderate Pain (4 - 6)  oxyCODONE    IR 10 milliGRAM(s) Oral every 6 hours PRN Severe Pain (7 - 10)      ROS  No fever, sweats, chills  No epistaxis, HA, sore throat  No CP, SOB, cough, sputum  No n/v/d, abd pain, melena, hematochezia  No edema  No rash  No anxiety  No back pain, joint pain  No bleeding, bruising  No dysuria, hematuria    T(C): 36.8 (11-03-23 @ 13:46), Max: 36.9 (11-02-23 @ 19:25)  HR: 69 (11-03-23 @ 13:46) (68 - 87)  BP: 118/77 (11-03-23 @ 13:46) (112/70 - 137/86)  RR: 18 (11-03-23 @ 13:46) (15 - 19)  SpO2: 100% (11-03-23 @ 13:46) (96% - 100%)  Wt(kg): --    PE  NAD  Awake, alert  Anicteric, MMM  RRR  CTAB  Abd soft, NT, ND  No c/c/e  No rash grossly  FROM                          12.3   6.99  )-----------( 160      ( 03 Nov 2023 11:08 )             38.9       11-03    136  |  98  |  17  ----------------------------<  106<H>  3.9   |  22  |  0.90    Ca    9.2      03 Nov 2023 11:08  Phos  2.9     11-03  Mg     1.9     11-03    TPro  7.3  /  Alb  4.4  /  TBili  0.4  /  DBili  x   /  AST  23  /  ALT  15  /  AlkPhos  80  11-01      ACC: [id number] EXAM:  MR HAND IC LT   ORDERED BY: STEFANIA KING     PROCEDURE DATE:  11/02/2023          INTERPRETATION:  LEFT HAND MRI    CLINICAL INDICATION: Pain and swelling.    COMPARISON: Radiographs dated 11/1/2023. No prior MRI available.    TECHNIQUE: Multiplanar, multisequence MRI was obtained of the left hand   both prior to and following administration of intravenous contrast. Total   8 cc Gadavist intravenous contrast was administered and 2 cc was   discarded.    FINDINGS:    OSSEOUS: No acute fracture, osteonecrosis, or aggressive neoplasm.    SYNOVIUM: No effusions.    TENDONS: Mild common flexor tenosynovitis of the middle finger with   subacute grade 2 strain/short segment partial thickness interstitial tear   of the ulnar-sided band of the profundus slip at the level of the neck of   the proximal phalanx (series 4, image 19). Flexor and extensor tendons   are are otherwise intact. No bowstringing.    LIGAMENTS:  Subacute grade 1/2 capsular sprain injury at the MCP joint   and finger including partial-thickness tear of the ulnar sided proper   collateral ligament.    GENERAL: No mass lesion or drainable encapsulated fluid collection.    IMPRESSION:    Mild left middle finger common flexor tenosynovitis with grade 2   strain/short segment partial-thickness interstitial tear of the   ulnar-sided band of the profundus slip at the level of the neck of the   proximal phalanx. Flexor mechanism is otherwise intact. No bowstringing.    --- End of Report ---

## 2023-11-04 ENCOUNTER — TRANSCRIPTION ENCOUNTER (OUTPATIENT)
Age: 33
End: 2023-11-04

## 2023-11-04 VITALS
SYSTOLIC BLOOD PRESSURE: 116 MMHG | DIASTOLIC BLOOD PRESSURE: 72 MMHG | TEMPERATURE: 98 F | RESPIRATION RATE: 18 BRPM | HEART RATE: 68 BPM | OXYGEN SATURATION: 98 %

## 2023-11-04 LAB
ALBUMIN SERPL ELPH-MCNC: 4.1 G/DL — SIGNIFICANT CHANGE UP (ref 3.3–5)
ALBUMIN SERPL ELPH-MCNC: 4.1 G/DL — SIGNIFICANT CHANGE UP (ref 3.3–5)
ALP SERPL-CCNC: 75 U/L — SIGNIFICANT CHANGE UP (ref 40–120)
ALP SERPL-CCNC: 75 U/L — SIGNIFICANT CHANGE UP (ref 40–120)
ALT FLD-CCNC: 15 U/L — SIGNIFICANT CHANGE UP (ref 10–45)
ALT FLD-CCNC: 15 U/L — SIGNIFICANT CHANGE UP (ref 10–45)
ANION GAP SERPL CALC-SCNC: 15 MMOL/L — SIGNIFICANT CHANGE UP (ref 5–17)
ANION GAP SERPL CALC-SCNC: 15 MMOL/L — SIGNIFICANT CHANGE UP (ref 5–17)
APTT BLD: 36.9 SEC — HIGH (ref 24.5–35.6)
APTT BLD: 36.9 SEC — HIGH (ref 24.5–35.6)
AST SERPL-CCNC: 28 U/L — SIGNIFICANT CHANGE UP (ref 10–40)
AST SERPL-CCNC: 28 U/L — SIGNIFICANT CHANGE UP (ref 10–40)
BILIRUB SERPL-MCNC: 0.4 MG/DL — SIGNIFICANT CHANGE UP (ref 0.2–1.2)
BILIRUB SERPL-MCNC: 0.4 MG/DL — SIGNIFICANT CHANGE UP (ref 0.2–1.2)
BUN SERPL-MCNC: 13 MG/DL — SIGNIFICANT CHANGE UP (ref 7–23)
BUN SERPL-MCNC: 13 MG/DL — SIGNIFICANT CHANGE UP (ref 7–23)
CALCIUM SERPL-MCNC: 9.5 MG/DL — SIGNIFICANT CHANGE UP (ref 8.4–10.5)
CALCIUM SERPL-MCNC: 9.5 MG/DL — SIGNIFICANT CHANGE UP (ref 8.4–10.5)
CHLORIDE SERPL-SCNC: 103 MMOL/L — SIGNIFICANT CHANGE UP (ref 96–108)
CHLORIDE SERPL-SCNC: 103 MMOL/L — SIGNIFICANT CHANGE UP (ref 96–108)
CO2 SERPL-SCNC: 23 MMOL/L — SIGNIFICANT CHANGE UP (ref 22–31)
CO2 SERPL-SCNC: 23 MMOL/L — SIGNIFICANT CHANGE UP (ref 22–31)
CREAT SERPL-MCNC: 1.01 MG/DL — SIGNIFICANT CHANGE UP (ref 0.5–1.3)
CREAT SERPL-MCNC: 1.01 MG/DL — SIGNIFICANT CHANGE UP (ref 0.5–1.3)
EGFR: 101 ML/MIN/1.73M2 — SIGNIFICANT CHANGE UP
EGFR: 101 ML/MIN/1.73M2 — SIGNIFICANT CHANGE UP
GLUCOSE SERPL-MCNC: 87 MG/DL — SIGNIFICANT CHANGE UP (ref 70–99)
GLUCOSE SERPL-MCNC: 87 MG/DL — SIGNIFICANT CHANGE UP (ref 70–99)
INR BLD: 1.74 RATIO — HIGH (ref 0.85–1.18)
INR BLD: 1.74 RATIO — HIGH (ref 0.85–1.18)
MAGNESIUM SERPL-MCNC: 2 MG/DL — SIGNIFICANT CHANGE UP (ref 1.6–2.6)
MAGNESIUM SERPL-MCNC: 2 MG/DL — SIGNIFICANT CHANGE UP (ref 1.6–2.6)
MRSA PCR RESULT.: SIGNIFICANT CHANGE UP
MRSA PCR RESULT.: SIGNIFICANT CHANGE UP
PHOSPHATE SERPL-MCNC: 2.5 MG/DL — SIGNIFICANT CHANGE UP (ref 2.5–4.5)
PHOSPHATE SERPL-MCNC: 2.5 MG/DL — SIGNIFICANT CHANGE UP (ref 2.5–4.5)
POTASSIUM SERPL-MCNC: 3.9 MMOL/L — SIGNIFICANT CHANGE UP (ref 3.5–5.3)
POTASSIUM SERPL-MCNC: 3.9 MMOL/L — SIGNIFICANT CHANGE UP (ref 3.5–5.3)
POTASSIUM SERPL-SCNC: 3.9 MMOL/L — SIGNIFICANT CHANGE UP (ref 3.5–5.3)
POTASSIUM SERPL-SCNC: 3.9 MMOL/L — SIGNIFICANT CHANGE UP (ref 3.5–5.3)
PROT SERPL-MCNC: 7.3 G/DL — SIGNIFICANT CHANGE UP (ref 6–8.3)
PROT SERPL-MCNC: 7.3 G/DL — SIGNIFICANT CHANGE UP (ref 6–8.3)
PROTHROM AB SERPL-ACNC: 18 SEC — HIGH (ref 9.5–13)
PROTHROM AB SERPL-ACNC: 18 SEC — HIGH (ref 9.5–13)
S AUREUS DNA NOSE QL NAA+PROBE: SIGNIFICANT CHANGE UP
S AUREUS DNA NOSE QL NAA+PROBE: SIGNIFICANT CHANGE UP
SODIUM SERPL-SCNC: 141 MMOL/L — SIGNIFICANT CHANGE UP (ref 135–145)
SODIUM SERPL-SCNC: 141 MMOL/L — SIGNIFICANT CHANGE UP (ref 135–145)

## 2023-11-04 PROCEDURE — 84100 ASSAY OF PHOSPHORUS: CPT

## 2023-11-04 PROCEDURE — 87641 MR-STAPH DNA AMP PROBE: CPT

## 2023-11-04 PROCEDURE — 85025 COMPLETE CBC W/AUTO DIFF WBC: CPT

## 2023-11-04 PROCEDURE — 86901 BLOOD TYPING SEROLOGIC RH(D): CPT

## 2023-11-04 PROCEDURE — 73140 X-RAY EXAM OF FINGER(S): CPT

## 2023-11-04 PROCEDURE — 83735 ASSAY OF MAGNESIUM: CPT

## 2023-11-04 PROCEDURE — 86850 RBC ANTIBODY SCREEN: CPT

## 2023-11-04 PROCEDURE — 85730 THROMBOPLASTIN TIME PARTIAL: CPT

## 2023-11-04 PROCEDURE — 81003 URINALYSIS AUTO W/O SCOPE: CPT

## 2023-11-04 PROCEDURE — A9585: CPT

## 2023-11-04 PROCEDURE — 87070 CULTURE OTHR SPECIMN AEROBIC: CPT

## 2023-11-04 PROCEDURE — 87040 BLOOD CULTURE FOR BACTERIA: CPT

## 2023-11-04 PROCEDURE — 36415 COLL VENOUS BLD VENIPUNCTURE: CPT

## 2023-11-04 PROCEDURE — 86900 BLOOD TYPING SEROLOGIC ABO: CPT

## 2023-11-04 PROCEDURE — 73219 MRI UPPER EXTREMITY W/DYE: CPT | Mod: MA

## 2023-11-04 PROCEDURE — 80048 BASIC METABOLIC PNL TOTAL CA: CPT

## 2023-11-04 PROCEDURE — 99285 EMERGENCY DEPT VISIT HI MDM: CPT

## 2023-11-04 PROCEDURE — 87640 STAPH A DNA AMP PROBE: CPT

## 2023-11-04 PROCEDURE — 80053 COMPREHEN METABOLIC PANEL: CPT

## 2023-11-04 PROCEDURE — 99239 HOSP IP/OBS DSCHRG MGMT >30: CPT

## 2023-11-04 PROCEDURE — 85610 PROTHROMBIN TIME: CPT

## 2023-11-04 PROCEDURE — 85027 COMPLETE CBC AUTOMATED: CPT

## 2023-11-04 RX ORDER — MYCOPHENOLATE MOFETIL 250 MG/1
3 CAPSULE ORAL
Refills: 0 | DISCHARGE

## 2023-11-04 RX ORDER — CEPHALEXIN 500 MG
1 CAPSULE ORAL
Qty: 40 | Refills: 0
Start: 2023-11-04 | End: 2023-11-13

## 2023-11-04 RX ORDER — WARFARIN SODIUM 2.5 MG/1
1.5 TABLET ORAL ONCE
Refills: 0 | Status: DISCONTINUED | OUTPATIENT
Start: 2023-11-04 | End: 2023-11-04

## 2023-11-04 RX ORDER — WARFARIN SODIUM 2.5 MG/1
7.5 TABLET ORAL ONCE
Refills: 0 | Status: DISCONTINUED | OUTPATIENT
Start: 2023-11-04 | End: 2023-11-04

## 2023-11-04 RX ADMIN — OXYCODONE HYDROCHLORIDE 10 MILLIGRAM(S): 5 TABLET ORAL at 03:30

## 2023-11-04 RX ADMIN — Medication 200 MILLIGRAM(S): at 06:23

## 2023-11-04 RX ADMIN — Medication 75 MILLIGRAM(S): at 11:24

## 2023-11-04 RX ADMIN — OXYCODONE HYDROCHLORIDE 10 MILLIGRAM(S): 5 TABLET ORAL at 10:21

## 2023-11-04 RX ADMIN — CHLORHEXIDINE GLUCONATE 1 APPLICATION(S): 213 SOLUTION TOPICAL at 11:23

## 2023-11-04 RX ADMIN — Medication 200 MILLIGRAM(S): at 17:12

## 2023-11-04 RX ADMIN — Medication 100 MILLIGRAM(S): at 14:05

## 2023-11-04 RX ADMIN — Medication 200 MILLIGRAM(S): at 06:25

## 2023-11-04 RX ADMIN — OXYCODONE HYDROCHLORIDE 10 MILLIGRAM(S): 5 TABLET ORAL at 11:37

## 2023-11-04 RX ADMIN — LISINOPRIL 10 MILLIGRAM(S): 2.5 TABLET ORAL at 06:23

## 2023-11-04 RX ADMIN — Medication 100 MILLIGRAM(S): at 06:22

## 2023-11-04 RX ADMIN — OXYCODONE HYDROCHLORIDE 10 MILLIGRAM(S): 5 TABLET ORAL at 02:36

## 2023-11-04 NOTE — PROGRESS NOTE ADULT - SUBJECTIVE AND OBJECTIVE BOX
Patient is a 33y old  Male who presents with a chief complaint of LD3 cellulitis, possible flexor tenosynovitis (2023 15:50)    SUBJECTIVE / OVERNIGHT EVENTS: Patient seen and examined at bedside. No overnight events.    MEDICATIONS  (STANDING):  ceFAZolin   IVPB 2000 milliGRAM(s) IV Intermittent every 8 hours  chlorhexidine 4% Liquid 1 Application(s) Topical daily  hydroxychloroquine 200 milliGRAM(s) Oral two times a day  lisinopril 10 milliGRAM(s) Oral daily  metoprolol succinate  milliGRAM(s) Oral daily  venlafaxine 75 milliGRAM(s) Oral daily    MEDICATIONS  (PRN):  acetaminophen     Tablet .. 975 milliGRAM(s) Oral every 6 hours PRN Temp greater or equal to 38C (100.4F), Mild Pain (1 - 3)  oxyCODONE    IR 5 milliGRAM(s) Oral every 6 hours PRN Moderate Pain (4 - 6)  oxyCODONE    IR 10 milliGRAM(s) Oral every 6 hours PRN Severe Pain (7 - 10)    Vital Signs Last 24 Hrs  T(C): 36.9 (2023 06:05), Max: 36.9 (2023 06:05)  T(F): 98.4 (2023 06:05), Max: 98.4 (2023 06:05)  HR: 66 (2023 06:23) (63 - 69)  BP: 118/72 (2023 06:23) (111/66 - 118/77)  BP(mean): --  RR: 18 (2023 06:05) (18 - 18)  SpO2: 98% (2023 06:05) (98% - 100%)    Parameters below as of 2023 06:05  Patient On (Oxygen Delivery Method): room air    CAPILLARY BLOOD GLUCOSE    I&O's Summary    2023 07:01  -  2023 07:00  --------------------------------------------------------  IN: 1420 mL / OUT: 0 mL / NET: 1420 mL    PHYSICAL EXAM:  General: WN/WD NAD  Neurology: A&Ox3  Respiratory: CTA B/L, No wheezing, rales, rhonchi  CV: RRR, +S1/S2, -S3/S4, no murmurs, rubs or gallops  Abdominal: Soft, NT, ND +BS, No HSMy  Extremities: Left hand post operative changes. no out of perforation tenderness  Skin: No Rashes, Hematoma, Ecchymosis    LABS:                        12.3   6.99  )-----------( 160      ( 2023 11:08 )             38.9     11-03    136  |  98  |  17  ----------------------------<  106<H>  3.9   |  22  |  0.90    Ca    9.2      2023 11:08  Phos  2.9     11-03  Mg     1.9     11-03    PT/INR - ( 2023 07:15 )   PT: 18.0 sec;   INR: 1.74 ratio       PTT - ( 2023 07:15 )  PTT:36.9 sec    Urinalysis Basic - ( 2023 22:56 )    Color: Yellow / Appearance: Clear / S.012 / pH: x  Gluc: x / Ketone: Trace mg/dL  / Bili: Negative / Urobili: 0.2 mg/dL   Blood: x / Protein: Negative mg/dL / Nitrite: Negative   Leuk Esterase: Negative / RBC: x / WBC x   Sq Epi: x / Non Sq Epi: x / Bacteria: x    RADIOLOGY & ADDITIONAL TESTS:    Imaging Personally Reviewed:    Consultant(s) Notes Reviewed:      Care Discussed with Consultants/Other Providers:    Franck Brizuela MD, Internal Medicine Resident Patient is a 33y old  Male who presents with a chief complaint of LD3 cellulitis, possible flexor tenosynovitis (2023 15:50)    SUBJECTIVE / OVERNIGHT EVENTS: Patient seen and examined at bedside. No overnight events. Patient feeling well this AM but notes swelling and erythema of L index finger with slight pain upon bending. Otherwise without acute concerns.    MEDICATIONS  (STANDING):  ceFAZolin   IVPB 2000 milliGRAM(s) IV Intermittent every 8 hours  chlorhexidine 4% Liquid 1 Application(s) Topical daily  hydroxychloroquine 200 milliGRAM(s) Oral two times a day  lisinopril 10 milliGRAM(s) Oral daily  metoprolol succinate  milliGRAM(s) Oral daily  venlafaxine 75 milliGRAM(s) Oral daily    MEDICATIONS  (PRN):  acetaminophen     Tablet .. 975 milliGRAM(s) Oral every 6 hours PRN Temp greater or equal to 38C (100.4F), Mild Pain (1 - 3)  oxyCODONE    IR 5 milliGRAM(s) Oral every 6 hours PRN Moderate Pain (4 - 6)  oxyCODONE    IR 10 milliGRAM(s) Oral every 6 hours PRN Severe Pain (7 - 10)    Vital Signs Last 24 Hrs  T(C): 36.9 (2023 06:05), Max: 36.9 (2023 06:05)  T(F): 98.4 (2023 06:05), Max: 98.4 (2023 06:05)  HR: 66 (2023 06:23) (63 - 69)  BP: 118/72 (2023 06:23) (111/66 - 118/77)  BP(mean): --  RR: 18 (2023 06:05) (18 - 18)  SpO2: 98% (2023 06:05) (98% - 100%)    Parameters below as of 2023 06:05  Patient On (Oxygen Delivery Method): room air    CAPILLARY BLOOD GLUCOSE    I&O's Summary    2023 07:01  -  2023 07:00  --------------------------------------------------------  IN: 1420 mL / OUT: 0 mL / NET: 1420 mL    PHYSICAL EXAM:  General: WN/WD NAD  Neurology: A&Ox3  Respiratory: CTA B/L, No wheezing, rales, rhonchi  CV: RRR, +S1/S2, -S3/S4, no murmurs, rubs or gallops  Abdominal: Soft, NT, ND +BS, No HSMy  Extremities: Left hand post operative changes. no out of perforation tenderness  Skin: No Rashes, Hematoma, Ecchymosis    LABS:                        12.3   6.99  )-----------( 160      ( 2023 11:08 )             38.9     11-03    136  |  98  |  17  ----------------------------<  106<H>  3.9   |  22  |  0.90    Ca    9.2      2023 11:08  Phos  2.9     11-03  Mg     1.9     11-03    PT/INR - ( 2023 07:15 )   PT: 18.0 sec;   INR: 1.74 ratio       PTT - ( 2023 07:15 )  PTT:36.9 sec    Urinalysis Basic - ( 2023 22:56 )    Color: Yellow / Appearance: Clear / S.012 / pH: x  Gluc: x / Ketone: Trace mg/dL  / Bili: Negative / Urobili: 0.2 mg/dL   Blood: x / Protein: Negative mg/dL / Nitrite: Negative   Leuk Esterase: Negative / RBC: x / WBC x   Sq Epi: x / Non Sq Epi: x / Bacteria: x    RADIOLOGY & ADDITIONAL TESTS:    Imaging Personally Reviewed:    Consultant(s) Notes Reviewed:      Care Discussed with Consultants/Other Providers:    Franck Brizuela MD, Internal Medicine Resident

## 2023-11-04 NOTE — PROGRESS NOTE ADULT - PROBLEM SELECTOR PLAN 4
as low as 37%, more recently w/ recovery of LV fn on med tx  followed by cards Dr. Ventura, last seen 6/2023 per HIE review   - c/w ACEi (lisinopril 10mg QD), BB (metoprolol succinate 200mg QD)  - monitor Is/Os, daily wt

## 2023-11-04 NOTE — DISCHARGE NOTE NURSING/CASE MANAGEMENT/SOCIAL WORK - PATIENT PORTAL LINK FT
You can access the FollowMyHealth Patient Portal offered by [facility] by registering at the following website: [email or web address]. By joining [employer]’s FollowMyHealth portal, you will also be able to view your health information using other applications (apps) compatible with our system.

## 2023-11-04 NOTE — PROGRESS NOTE ADULT - PROBLEM SELECTOR PLAN 3
as above  followed by heme Dr. Danielle Jett   - f/u coaalia in AM, consider coumadin dose (last dose night PTA) pending plastic recs re OR for washout as above  followed by heme Dr. Danielle Jett   - Coumadin held for washout and restarted 11/3.  - Daily coags, dose warfarin for goal INR 2-3

## 2023-11-04 NOTE — PROGRESS NOTE ADULT - PROVIDER SPECIALTY LIST ADULT
Plastic Surgery
Tried calling [facility]  Rx faxed over and sent to pharmacy   
Plastic Surgery
Heme/Onc
Hospitalist
Plastic Surgery
Internal Medicine
Internal Medicine

## 2023-11-04 NOTE — PROGRESS NOTE ADULT - PROBLEM SELECTOR PROBLEM 1
Injury of left middle finger, sequela

## 2023-11-04 NOTE — DISCHARGE NOTE PROVIDER - PROVIDER TOKENS
PROVIDER:[TOKEN:[[id number]],FOLLOWUP:[1 week]],PROVIDER:[TOKEN:[[id number]],FOLLOWUP:[1 month],ESTABLISHEDPATIENT:[T]] PROVIDER:[TOKEN:[[id number]],FOLLOWUP:[1 week]],PROVIDER:[TOKEN:[[id number]],FOLLOWUP:[1 month],ESTABLISHEDPATIENT:[T]],PROVIDER:[TOKEN:[[id number]],FOLLOWUP:[1-3 days],ESTABLISHEDPATIENT:[T]]

## 2023-11-04 NOTE — PROGRESS NOTE ADULT - PROBLEM SELECTOR PLAN 2
SLE/APLS/Raynauds (on chronic immunosuppression c/b cutaneous LE vasculitis and periungual infarcts, polyarthritis, nephritis, myopericarditis, RLE DVT/PE 2013 on AC, osteonecrosis hips/shoulders/knees, followed by rheum Dr. Mcdonald)  - contact Dr. Mcdonald in AM re continuation of immunosuppression  in context of LD3 cellulitis, possible flexor tenosynovitis, hold in interim SLE/APLS/Raynauds (on chronic immunosuppression c/b cutaneous LE vasculitis and periungual infarcts, polyarthritis, nephritis, myopericarditis, RLE DVT/PE 2013 on AC, osteonecrosis hips/shoulders/knees, followed by rheum Dr. Mcdonald)  - Dr. Mcdonald contacted for continuation of immunosuppression in context of LD3 cellulitis, possible flexor tenosynovitis

## 2023-11-04 NOTE — DISCHARGE NOTE PROVIDER - CARE PROVIDERS DIRECT ADDRESSES
,DirectAddress_Unknown,[email or web address] ,DirectAddress_Unknown,[email or web address]

## 2023-11-04 NOTE — PROGRESS NOTE ADULT - ASSESSMENT
33M with tenosenovitis s/p washout of left 3rd digit    - hand elevation  - Dressing changes once daily with gauze and cling  - Abx course  - ok to dc from plastic surgery perspective, follow up outpatient with Dr. Kirkpatrick    will sign off at this time      Plastic and Reconstructive Surgery  [facility]: [phone]  [facility]: [phone]  [facility]: Spectra [phone] (Green team)

## 2023-11-04 NOTE — DISCHARGE NOTE PROVIDER - NSDCFUADDAPPT_GEN_ALL_CORE_FT
APPTS ARE READY TO BE MADE: [X] YES    Best Family or Patient Contact (if needed):    Additional Information about above appointments (if needed):    1: Needs follow-up with Dr. Kirkpatrick of plastic surgery in 1-2 weeks.  2: Should follow up with rheumatology to assess further need for warfarin  3:     Other comments or requests:  APPTS ARE READY TO BE MADE: [X] YES    Best Family or Patient Contact (if needed):    Additional Information about above appointments (if needed):    1: Needs follow-up with Dr. Jett for INR check early next week.  2. Needs follow-up with Dr. Kirkpatrick of plastic surgery in 1-2 weeks.  3: Should follow up with rheumatology to assess further need for warfarin     Other comments or requests:  APPTS ARE READY TO BE MADE: [X] YES    Best Family or Patient Contact (if needed):    Additional Information about above appointments (if needed):    1: Needs follow-up with Dr. Jett for INR check early next week.  2. Needs follow-up with Dr. Kirkpatrick of plastic surgery in 1-2 weeks.  3: Should follow up with rheumatology to assess further need for warfarin     Other comments or requests:   Pt is susan with Dr. Jett on 11/10, [address] [facility].

## 2023-11-04 NOTE — DISCHARGE NOTE PROVIDER - NSDCCPTREATMENT_GEN_ALL_CORE_FT
PRINCIPAL PROCEDURE  Procedure: Magnetic resonance imaging (MRI) of left hand/finger joint  Findings and Treatment: FINDINGS:  OSSEOUS: No acute fracture, osteonecrosis, or aggressive neoplasm.  SYNOVIUM: No effusions.  TENDONS: Mild common flexor tenosynovitis of the middle finger with   subacute grade 2 strain/short segment partial thickness interstitial tear   of the ulnar-sided band of the profundus slip at the level of the neck of   the proximal phalanx (series 4, image 19). Flexor and extensor tendons   are are otherwise intact. No bowstringing.  LIGAMENTS:  Subacute grade 1/2 capsular sprain injury at the MCP joint   and finger including partial-thickness tear of the ulnar sided proper   collateral ligament.  GENERAL: No mass lesion or drainable encapsulated fluid collection.  IMPRESSION:  Mild left middle finger common flexor tenosynovitis with grade 2   strain/short segment partial-thickness interstitial tear of the   ulnar-sided band of the profundus slip at the level of the neck of the   proximal phalanx. Flexor mechanism is otherwise intact. No bowstringing.

## 2023-11-04 NOTE — DISCHARGE NOTE PROVIDER - NSDCFUSCHEDAPPT_GEN_ALL_CORE_FT
Guillaume Ventura  [facility]  [facility] [address]  Scheduled Appointment: 12/05/2023

## 2023-11-04 NOTE — PROGRESS NOTE ADULT - ASSESSMENT
33 yr M with pmh of SLE, APLAS, Raynauds (on chronic immunosuppression c/b cutaneous LE vasculitis and periungual infarcts, polyarthritis, nephritis, myopericarditis, RLE DVT/PE 2013 on AC, osteonecrosis hips/shoulders/knees, followed by rheum Dr. Mcdonald), HF w/ recovered EF (followed by cards Dr. Ventura) p/w L 3rd digit injury. Patient now s/p left middle finger tenosynovitis wash out.    hx DVT/PE  APLS  --under the care of Dr Danielle Jett of [facility]  --diagnosed with RLE DVT/PE in 2008, has been on coumadin since then due to positive APLS.     --recent repeat APLS panel shows no anti­cardiolipin, lupus anticoagulant, or B2 glycoprotein ab   --remains on coumadin 7.5 QD, d/t increased risk for DVT/PE from SLE. Today, INR 1.7, likely drifted down due to coumadin being held. Would give him one dose of 9mg today and cont home dose of 7.5mg starting toomorrow  --ongoing care after discharge. Pt should f/u with us in the office next week for repeat INR    hx of lupus nephritis   --w/ stable disease on Benlysta  --follows w/ Dr Mcdonald     finger tenosynovitis  --noted on MR of L hand  --Plastic surgery following  --s/p incision and washout  --on abx    ok to d/c home today, d/w pt and primary team.

## 2023-11-04 NOTE — PROGRESS NOTE ADULT - ASSESSMENT
33M SLE/APLS/Raynauds (on chronic immunosuppression c/b cutaneous LE vasculitis and periungual infarcts, polyarthritis, nephritis, myopericarditis, RLE DVT/PE 2013 on AC, osteonecrosis hips/shoulders/knees, followed by rheum Dr. Mcdonald), HF w/ recovered EF (followed by cards Dr. Ventura) a/w L 3rd digit cellulitis, possible flexor tenosynovitis in context of laceration (razor blade injury while organizing shaving tools), pending MRI, plastic surgery Dr. Kirkpatrick on board 33M SLE/APLS/Raynauds (on chronic immunosuppression c/b cutaneous LE vasculitis and periungual infarcts, polyarthritis, nephritis, myopericarditis, RLE DVT/PE 2013 on AC, osteonecrosis hips/shoulders/knees, followed by rheum Dr. Mcdonald), HF w/ recovered EF (followed by cards Dr. Ventura) a/w L 3rd digit cellulitis, possible flexor tenosynovitis in context of laceration (razor blade injury while organizing shaving tools), s/p washout by plastic surgeon Dr. Kirkpatrick

## 2023-11-04 NOTE — PROGRESS NOTE ADULT - PROBLEM SELECTOR PLAN 5
Screening EKG: not available in MUSE or physical chart   Chemical DVT ppx: INR therpaeutic on coumadin   Diet: NPO ON for plastics intervention   Precautions: fall  Dispo: pending course
Screening EKG: not available in MUSE or physical chart, will order for f/u  Chemical DVT ppx: INR prolonged on coumadin   Diet: NPO ON for potential plastics intervention   Precautions: fall  Dispo: pending course
Screening EKG: not available in MUSE or physical chart, will order for f/u  Chemical DVT ppx: INR prolonged on coumadin   Diet: NPO ON for potential plastics intervention   Precautions: fall  Dispo: pending course

## 2023-11-04 NOTE — DISCHARGE NOTE PROVIDER - CARE PROVIDER_API CALL
Jonathan Kirkpatrick.  Plastic Surgery  [address] [place] [address]  Phone: [phone]  Fax: [phone]  Follow Up Time: 1 week    Jose Mcdonald  Rheumatology  [address] [place] [address]  Phone: [phone]  Fax: [phone]  Established Patient  Follow Up Time: 1 month   Jonathan Kirkpatrick  Plastic Surgery  [address] [place] [address]  Phone: [phone]  Fax: [phone]  Follow Up Time: 1 week    Jose Mcdonald  Rheumatology  [address] [facility] [address]  [place] [address]  Phone: [phone]  Fax: [phone]  Established Patient  Follow Up Time: 1 month    Danielle Jett  Hematology  [address] [place] [address]  Phone: [phone]  Fax: [phone]  Established Patient  Follow Up Time: 1-3 days

## 2023-11-04 NOTE — DISCHARGE NOTE PROVIDER - HOSPITAL COURSE
HPI:  33M SLE/APS/Raynauds (on chronic immunosuppression c/b cutaneous LE vasculitis and periungual infarcts, polyarthritis, nephritis, myopericarditis, RLE DVT/PE 2013 on AC, osteonecrosis hips/shoulders/knees, followed by rheum Dr. Mcdonald), HF w/ recovered EF (followed by cards Dr. Ventura) p/w L 3rd digit injury. Pt relays  health when 10/29 experienced injury to L 3rd digit while organizing razor blade (uses double edged razor blade w/ safety razor to shave face) w/ resulting bleed which he applied pressure to w/ successful hemostasis. This AM he noticed swelling in area, in addition to pain, prompting UC presentation, who referred him to ED. Otherwise not endorsing complaints.     VS: afebrile tmax 37.1C, HR 60s-90s, BP 120s-130s/70s-80s, RR 17-18, SpO2% 95-97 RA   Labs: normocytic (MCV 91.1) anemia H/H 12.6/39.8; INR prolonged 2.56;   Micro: unavailable; BCx x 2 in lab  Imaging:   - XR L fingers: no acute fractures   Received: unasyn 3g IV, doxy 100mg PO      Hospital Course:   Unasyn + doxy was switched to cefazolin following admission. Plastic surgeon Dr. Jonathan Kirkpatrick c/s by ED; patient underwent left middle finger tenosynovitis washout on 11/2 and tolerated well. Warfarin therapy paused prior to OR and resumed on 11/3. Abx therapy continued up until discharge and converted to PO cephalexin 500mg for total of 14 day abx course. Patient with residual pain and swelling of adjacent index finger, but deemed likely residual inflammation from recent surgery.       Important Medication Changes and Reason: Cephalexin 500mg q6h x 10 days    Active or Pending Issues Requiring Follow-up: Post-operative check for left middle finger    Advanced Directives:   [X] Full code  [ ] DNR  [ ] Hospice    Discharge Diagnoses: LD3 Flexor Tenosynovitis

## 2023-11-04 NOTE — PROGRESS NOTE ADULT - REASON FOR ADMISSION
LD3 cellulitis, possible flexor tenosynovitis

## 2023-11-04 NOTE — PROGRESS NOTE ADULT - SUBJECTIVE AND OBJECTIVE BOX
Pt seen, feeling better, hand feeling better with pain meds.     MEDICATIONS  (STANDING):  ceFAZolin   IVPB 2000 milliGRAM(s) IV Intermittent every 8 hours  chlorhexidine 4% Liquid 1 Application(s) Topical daily  hydroxychloroquine 200 milliGRAM(s) Oral two times a day  lisinopril 10 milliGRAM(s) Oral daily  metoprolol succinate  milliGRAM(s) Oral daily  venlafaxine 75 milliGRAM(s) Oral daily  warfarin 7.5 milliGRAM(s) Oral once  warfarin 1.5 milliGRAM(s) Oral once    MEDICATIONS  (PRN):  acetaminophen     Tablet .. 975 milliGRAM(s) Oral every 6 hours PRN Temp greater or equal to 38C (100.4F), Mild Pain (1 - 3)  oxyCODONE    IR 5 milliGRAM(s) Oral every 6 hours PRN Moderate Pain (4 - 6)  oxyCODONE    IR 10 milliGRAM(s) Oral every 6 hours PRN Severe Pain (7 - 10)      ROS  No fever, sweats, chills  No epistaxis, HA, sore throat  No CP, SOB, cough, sputum  No n/v/d, abd pain, melena, hematochezia  No rash  No anxiety  No back pain  No bleeding, bruising  No dysuria, hematuria    Vital Signs Last 24 Hrs  T(C): 36.7 (04 Nov 2023 14:07), Max: 36.9 (04 Nov 2023 06:05)  T(F): 98.1 (04 Nov 2023 14:07), Max: 98.4 (04 Nov 2023 06:05)  HR: 68 (04 Nov 2023 14:07) (63 - 68)  BP: 116/72 (04 Nov 2023 14:07) (111/66 - 118/72)  BP(mean): --  RR: 18 (04 Nov 2023 14:07) (18 - 18)  SpO2: 98% (04 Nov 2023 14:07) (98% - 98%)    Parameters below as of 04 Nov 2023 14:07  Patient On (Oxygen Delivery Method): room air        PE  NAD  Awake, alert  Anicteric  finger wrapped  full exam deferred today                          12.3   6.99  )-----------( 160      ( 03 Nov 2023 11:08 )             38.9       11-04    141  |  103  |  13  ----------------------------<  87  3.9   |  23  |  1.01    Ca    9.5      04 Nov 2023 07:16  Phos  2.5     11-04  Mg     2.0     11-04    TPro  7.3  /  Alb  4.1  /  TBili  0.4  /  DBili  x   /  AST  28  /  ALT  15  /  AlkPhos  75  11-04

## 2023-11-04 NOTE — PROGRESS NOTE ADULT - PROBLEM SELECTOR PLAN 1
razor blade injury, resultant edemaeval'd by plastic surgery Dr. Kirkpatrick in ED c/f LD3 cellulitis, possible flexor tenosynovitis, started with unasyn/dox, now placed on cefazolin, patient is non toxic appearing but immunocompromised  - f/u MR finger  - NPO ON, f/u further plastics recs in AM re potential washout  - continue cefazolin razor blade injury, resultant edemaeval'd by plastic surgery Dr. Kirkpatrick in ED c/f LD3 cellulitis, possible flexor tenosynovitis, started with unasyn/dox, now placed on cefazolin, patient is non toxic appearing but immunocompromised  - f/u MR finger - indicative of tenosynovitis  - S/p washout  - continue cefazolin, will send home on cephalexin

## 2023-11-04 NOTE — DISCHARGE NOTE NURSING/CASE MANAGEMENT/SOCIAL WORK - NSDCFUADDAPPT_GEN_ALL_CORE_FT
APPTS ARE READY TO BE MADE: [X] YES    Best Family or Patient Contact (if needed):    Additional Information about above appointments (if needed):    1: Needs follow-up with Dr. Jett for INR check early next week.  2. Needs follow-up with Dr. Kirkpatrick of plastic surgery in 1-2 weeks.  3: Should follow up with rheumatology to assess further need for warfarin     Other comments or requests:

## 2023-11-04 NOTE — PROGRESS NOTE ADULT - SUBJECTIVE AND OBJECTIVE BOX
Plastic Surgery    SUBJECTIVE: Pt seen and examined on rounds with team. No acute events overnight.        OBJECTIVE    PHYSICAL EXAM:   General: NAD, Lying in bed   Neuro: Awake and alert  Left 3rd digit with improved edema, erythema, no purulence, wounds healthy appearing    VITALS  T(C): 36.9 (11-04-23 @ 06:05), Max: 36.9 (11-04-23 @ 06:05)  HR: 66 (11-04-23 @ 06:23) (63 - 69)  BP: 118/72 (11-04-23 @ 06:23) (111/66 - 118/77)  RR: 18 (11-04-23 @ 06:05) (18 - 18)  SpO2: 98% (11-04-23 @ 06:05) (98% - 100%)  CAPILLARY BLOOD GLUCOSE          Is/Os    11-03 @ 07:01  -  11-04 @ 07:00  --------------------------------------------------------  IN:    IV PiggyBack: 100 mL    Oral Fluid: 1320 mL  Total IN: 1420 mL    OUT:  Total OUT: 0 mL    Total NET: 1420 mL          MEDICATIONS (STANDING): ceFAZolin   IVPB 2000 milliGRAM(s) IV Intermittent every 8 hours  hydroxychloroquine 200 milliGRAM(s) Oral two times a day  lisinopril 10 milliGRAM(s) Oral daily  metoprolol succinate  milliGRAM(s) Oral daily  venlafaxine 75 milliGRAM(s) Oral daily    MEDICATIONS (PRN):acetaminophen     Tablet .. 975 milliGRAM(s) Oral every 6 hours PRN Temp greater or equal to 38C (100.4F), Mild Pain (1 - 3)  oxyCODONE    IR 5 milliGRAM(s) Oral every 6 hours PRN Moderate Pain (4 - 6)  oxyCODONE    IR 10 milliGRAM(s) Oral every 6 hours PRN Severe Pain (7 - 10)      LABS  CBC (11-03 @ 11:08)                              12.3<L>                         6.99    )----------------(  160        85.6<H>% Neutrophils, 11.3<L>% Lymphocytes, ANC: 5.98                                38.9<L>    BMP (11-04 @ 07:16)             141     |  103     |  13    		Ca++ --      Ca 9.5                ---------------------------------( 87    		Mg 2.0                3.9     |  23      |  1.01  			Ph 2.5     BMP (11-03 @ 11:08)             136     |  98      |  17    		Ca++ --      Ca 9.2                ---------------------------------( 106<H>		Mg 1.9                3.9     |  22      |  0.90  			Ph 2.9       LFTs (11-04 @ 07:16)      TPro 7.3 / Alb 4.1 / TBili 0.4 / DBili -- / AST 28 / ALT 15 / AlkPhos 75    Coags (11-04 @ 07:15)  aPTT 36.9<H> / INR 1.74<H> / PT 18.0<H>  Coags (11-03 @ 11:08)  aPTT 33.3 / INR 2.07<H> / PT 22.3<H>            IMAGING STUDIES

## 2023-11-04 NOTE — PROGRESS NOTE ADULT - ATTENDING COMMENTS
33M SLE/APLS/Raynauds (on chronic immunosuppression c/b cutaneous LE vasculitis and periungual infarcts, polyarthritis, nephritis, myopericarditis, RLE DVT/PE 2013 on AC, osteonecrosis hips/shoulders/knees, followed by rheum Dr. Mcdonald), HF w/ recovered EF (followed by cards Dr. Ventura) a/w L 3rd digit cellulitis, possible flexor tenosynovitis in context of laceration (razor blade injury while organizing shaving tools), s/p washout by plastic surgeon Dr. Kirkpatrick.   Endorsing some swelling on adjacent index finger, but no significant pain; overall pain and swelling improving  Seen by plastics this AM, cleared for discharge   INR this AM 1.74, likely 2/2 coumadin being held     will d/c home today, will switch ancef to keflex 500mg q6h to complete total of 14 day course  outpt f/u with plastics   c/w coumadin 7.5mg daily - INR check in next 2-3 days, will inform his hematology team     stable for discharge home   38 minutes spent coordinating discharge 33M SLE/APLS/Raynauds (on chronic immunosuppression c/b cutaneous LE vasculitis and periungual infarcts, polyarthritis, nephritis, myopericarditis, RLE DVT/PE 2013 on AC, osteonecrosis hips/shoulders/knees, followed by rheum Dr. Mcdonald), HF w/ recovered EF (followed by cards Dr. Ventura) a/w L 3rd digit cellulitis, possible flexor tenosynovitis in context of laceration (razor blade injury while organizing shaving tools), s/p washout by plastic surgeon Dr. Kirkpatrick.   Endorsing some swelling on adjacent index finger, but no significant pain; overall pain and swelling improving  Seen by plastics this AM, cleared for discharge   INR this AM 1.74, likely 2/2 coumadin being held     will d/c home today, will switch ancef to keflex 500mg q6h to complete total of 14 day course  outpt f/u with plastics   d/w hematology team regarding subtherapeutic INR - will give 9mg coumadin tonight, and resume home dose tmrw; f/u hematology as outpt     stable for discharge home   38 minutes spent coordinating discharge

## 2023-11-04 NOTE — DISCHARGE NOTE PROVIDER - NSDCMRMEDTOKEN_GEN_ALL_CORE_FT
Calcium, Vitamin D, Vitamin C:   CellCept 500 mg oral tablet: 3 tab(s) orally 2 times a day  cephalexin 500 mg oral tablet: 1 tab(s) orally every 6 hours  Coumadin 7.5 mg oral tablet: 1 tab(s) orally once a day  Effexor 75 mg oral tablet: 1 tab(s) orally once a day  lisinopril 10 mg oral tablet: 1 tab(s) orally once a day  metoprolol succinate 200 mg oral capsule, extended release: 1 cap(s) orally once a day  Plaquenil 200 mg oral tablet: 1 tab(s) orally 2 times a day   Calcium, Vitamin D, Vitamin C:   cephalexin 500 mg oral tablet: 1 tab(s) orally every 6 hours  Coumadin 7.5 mg oral tablet: 1 tab(s) orally once a day  Effexor 75 mg oral tablet: 1 tab(s) orally once a day  lisinopril 10 mg oral tablet: 1 tab(s) orally once a day  metoprolol succinate 200 mg oral capsule, extended release: 1 cap(s) orally once a day  Plaquenil 200 mg oral tablet: 1 tab(s) orally 2 times a day   Calcium, Vitamin D, Vitamin C:   cephalexin 500 mg oral tablet: 1 tab(s) orally every 6 hours  cephalexin 500 mg oral tablet: 1 tab(s) orally every 6 hours  Coumadin 7.5 mg oral tablet: 1 tab(s) orally once a day  Effexor 75 mg oral tablet: 1 tab(s) orally once a day  lisinopril 10 mg oral tablet: 1 tab(s) orally once a day  metoprolol succinate 200 mg oral capsule, extended release: 1 cap(s) orally once a day  Plaquenil 200 mg oral tablet: 1 tab(s) orally 2 times a day

## 2023-11-04 NOTE — DISCHARGE NOTE PROVIDER - NSDCCPCAREPLAN_GEN_ALL_CORE_FT
PRINCIPAL DISCHARGE DIAGNOSIS  Diagnosis: Flexor tenosynovitis of finger  Assessment and Plan of Treatment: You were admitted with flexor tenosynovitis of your left middle finger. Plastic surgery was able to successfully washout the wound. We are sending you home with an oral antibiotics called cephalexin, which you should take for an extra 10 days after discharge. Please make sure to follow up with plastic surgery and your primary care provider in 1-2 weeks.     PRINCIPAL DISCHARGE DIAGNOSIS  Diagnosis: Flexor tenosynovitis of finger  Assessment and Plan of Treatment: You were admitted with flexor tenosynovitis of your left middle finger. Plastic surgery was able to successfully washout the wound. We are sending you home with an oral antibiotics called cephalexin, which you should take for an extra 10 days after discharge. Please make sure to follow up with plastic surgery and your primary care provider in 1-2 weeks.      SECONDARY DISCHARGE DIAGNOSES  Diagnosis: Systemic lupus erythematosus  Assessment and Plan of Treatment: Hold your cellcept until you complete antibiotics. Please follow-up with rheumatology.

## 2023-11-05 RX ORDER — CEPHALEXIN 500 MG
1 CAPSULE ORAL
Qty: 40 | Refills: 0
Start: 2023-11-05 | End: 2023-11-14

## 2023-11-07 LAB
CULTURE RESULTS: SIGNIFICANT CHANGE UP
SPECIMEN SOURCE: SIGNIFICANT CHANGE UP

## 2023-11-13 ENCOUNTER — TRANSCRIPTION ENCOUNTER (OUTPATIENT)
Age: 33
End: 2023-11-13

## 2023-11-16 ENCOUNTER — APPOINTMENT (OUTPATIENT)
Dept: CARE COORDINATION | Facility: HOME HEALTH | Age: 33
End: 2023-11-16
Payer: MEDICAID

## 2023-11-16 DIAGNOSIS — M65.9 SYNOVITIS AND TENOSYNOVITIS, UNSPECIFIED: ICD-10-CM

## 2023-11-16 PROCEDURE — 99347 HOME/RES VST EST SF MDM 20: CPT | Mod: 95

## 2023-11-21 ENCOUNTER — APPOINTMENT (OUTPATIENT)
Dept: RHEUMATOLOGY | Facility: CLINIC | Age: 33
End: 2023-11-21
Payer: MEDICAID

## 2023-11-21 VITALS
SYSTOLIC BLOOD PRESSURE: 109 MMHG | HEART RATE: 84 BPM | RESPIRATION RATE: 16 BRPM | DIASTOLIC BLOOD PRESSURE: 73 MMHG | OXYGEN SATURATION: 95 % | TEMPERATURE: 98.8 F

## 2023-11-21 VITALS
HEART RATE: 68 BPM | SYSTOLIC BLOOD PRESSURE: 105 MMHG | OXYGEN SATURATION: 79 % | DIASTOLIC BLOOD PRESSURE: 71 MMHG | RESPIRATION RATE: 16 BRPM

## 2023-11-21 PROCEDURE — 96413 CHEMO IV INFUSION 1 HR: CPT

## 2023-11-21 RX ORDER — BELIMUMAB 400 MG/5ML
400 INJECTION, POWDER, LYOPHILIZED, FOR SOLUTION INTRAVENOUS
Qty: 1 | Refills: 0 | Status: COMPLETED
Start: 2023-06-13

## 2023-11-22 LAB
ALBUMIN SERPL ELPH-MCNC: 4.6 G/DL
ALP BLD-CCNC: 89 U/L
ALT SERPL-CCNC: 14 U/L
ANION GAP SERPL CALC-SCNC: 14 MMOL/L
APPEARANCE: CLEAR
AST SERPL-CCNC: 24 U/L
BACTERIA: NEGATIVE /HPF
BILIRUB SERPL-MCNC: 0.3 MG/DL
BILIRUBIN URINE: NEGATIVE
BLOOD URINE: NEGATIVE
BUN SERPL-MCNC: 10 MG/DL
C3 SERPL-MCNC: 102 MG/DL
C4 SERPL-MCNC: 18 MG/DL
CALCIUM SERPL-MCNC: 9.7 MG/DL
CAST: 2 /LPF
CHLORIDE SERPL-SCNC: 101 MMOL/L
CK SERPL-CCNC: 333 U/L
CO2 SERPL-SCNC: 25 MMOL/L
COLOR: YELLOW
CREAT SERPL-MCNC: 1 MG/DL
CREAT SPEC-SCNC: 315 MG/DL
CREAT/PROT UR: 0.1 RATIO
EGFR: 103 ML/MIN/1.73M2
EPITHELIAL CELLS: 1 /HPF
GLUCOSE QUALITATIVE U: NEGATIVE MG/DL
KETONES URINE: ABNORMAL MG/DL
LEUKOCYTE ESTERASE URINE: NEGATIVE
MICROSCOPIC-UA: NORMAL
NITRITE URINE: NEGATIVE
PH URINE: 5.5
POTASSIUM SERPL-SCNC: 4.6 MMOL/L
PROT SERPL-MCNC: 7.2 G/DL
PROT UR-MCNC: 19 MG/DL
PROTEIN URINE: 30 MG/DL
RED BLOOD CELLS URINE: 2 /HPF
SODIUM SERPL-SCNC: 140 MMOL/L
SPECIFIC GRAVITY URINE: 1.02
UROBILINOGEN URINE: 0.2 MG/DL
WHITE BLOOD CELLS URINE: 1 /HPF

## 2023-11-23 LAB — DSDNA AB SER-ACNC: 157 IU/ML

## 2023-12-05 ENCOUNTER — APPOINTMENT (OUTPATIENT)
Dept: HEART AND VASCULAR | Facility: CLINIC | Age: 33
End: 2023-12-05
Payer: MEDICAID

## 2023-12-05 ENCOUNTER — APPOINTMENT (OUTPATIENT)
Dept: UROLOGY | Facility: CLINIC | Age: 33
End: 2023-12-05
Payer: MEDICAID

## 2023-12-05 VITALS
HEART RATE: 97 BPM | BODY MASS INDEX: 28.79 KG/M2 | WEIGHT: 190 LBS | TEMPERATURE: 98.1 F | DIASTOLIC BLOOD PRESSURE: 88 MMHG | HEIGHT: 68 IN | SYSTOLIC BLOOD PRESSURE: 110 MMHG | OXYGEN SATURATION: 99 %

## 2023-12-05 VITALS
TEMPERATURE: 98.1 F | DIASTOLIC BLOOD PRESSURE: 88 MMHG | BODY MASS INDEX: 28.79 KG/M2 | HEART RATE: 97 BPM | WEIGHT: 190 LBS | OXYGEN SATURATION: 99 % | SYSTOLIC BLOOD PRESSURE: 110 MMHG | HEIGHT: 68 IN

## 2023-12-05 DIAGNOSIS — I42.8 OTHER CARDIOMYOPATHIES: ICD-10-CM

## 2023-12-05 DIAGNOSIS — N52.9 MALE ERECTILE DYSFUNCTION, UNSPECIFIED: ICD-10-CM

## 2023-12-05 PROCEDURE — 99214 OFFICE O/P EST MOD 30 MIN: CPT

## 2023-12-05 PROCEDURE — 99213 OFFICE O/P EST LOW 20 MIN: CPT

## 2023-12-13 RX ORDER — BELIMUMAB 400 MG/5ML
400 INJECTION, POWDER, LYOPHILIZED, FOR SOLUTION INTRAVENOUS
Refills: 0 | Status: COMPLETED | OUTPATIENT
Start: 2023-12-13 | End: 1900-01-01

## 2023-12-19 ENCOUNTER — APPOINTMENT (OUTPATIENT)
Dept: RHEUMATOLOGY | Facility: CLINIC | Age: 33
End: 2023-12-19

## 2024-01-16 ENCOUNTER — APPOINTMENT (OUTPATIENT)
Dept: RHEUMATOLOGY | Facility: CLINIC | Age: 34
End: 2024-01-16
Payer: MEDICAID

## 2024-01-16 ENCOUNTER — LABORATORY RESULT (OUTPATIENT)
Age: 34
End: 2024-01-16

## 2024-01-16 VITALS
TEMPERATURE: 98.3 F | OXYGEN SATURATION: 98 % | HEART RATE: 80 BPM | SYSTOLIC BLOOD PRESSURE: 115 MMHG | RESPIRATION RATE: 16 BRPM | DIASTOLIC BLOOD PRESSURE: 76 MMHG

## 2024-01-16 VITALS
OXYGEN SATURATION: 98 % | RESPIRATION RATE: 16 BRPM | HEART RATE: 73 BPM | SYSTOLIC BLOOD PRESSURE: 117 MMHG | DIASTOLIC BLOOD PRESSURE: 74 MMHG

## 2024-01-16 PROCEDURE — 96413 CHEMO IV INFUSION 1 HR: CPT

## 2024-01-16 RX ORDER — BELIMUMAB 400 MG/5ML
400 INJECTION, POWDER, LYOPHILIZED, FOR SOLUTION INTRAVENOUS
Qty: 1 | Refills: 0 | Status: COMPLETED
Start: 2023-12-13

## 2024-01-16 NOTE — HISTORY OF PRESENT ILLNESS
[N/A] : N/A [Denies] : Denies [No] : No [Yes] : Yes [de-identified] : rash to face [24g] : 24g [Start Time: ___] : Medication Start Time: [unfilled] [End Time: ___] : Medication End Time: [unfilled] [Medication Name: ___] : Medication Name: [unfilled] [Total Amount Administered: ___] : Total Amount Administered: [unfilled] [IV discontinued. Intact. No signs or symptoms of IV complications noted. Time: ___] : IV discontinued. Intact. No signs or symptoms of IV complications noted. Time: [unfilled] [Patient  instructed to seek medical attention with signs and symptoms of adverse effects] : Patient  instructed to seek medical attention with signs and symptoms of adverse effects [Patient left unit in no acute distress] : Patient left unit in no acute distress [Medications administered as ordered and tolerated well.] : Medications administered as ordered and tolerated well. [Blood drawn at time of visit] : Blood drawn at time of visit [de-identified] : left hand radial vein [de-identified] : labs drawn as prescribed [de-identified] : Patient presents for scheduled Benlysta infusion, ambulatory in [facility]. Patient reports to be doing well, other than feeling fatigue and mild rash to face. Patient denies of having any complaints or symptoms today. Medication administered as prescribed and infusion tolerated well. Patient left unit ambulatory in [facility].

## 2024-01-17 LAB
ALBUMIN SERPL ELPH-MCNC: 4.4 G/DL
ALP BLD-CCNC: 84 U/L
ALT SERPL-CCNC: 11 U/L
ANION GAP SERPL CALC-SCNC: 17 MMOL/L
APPEARANCE: CLEAR
AST SERPL-CCNC: 25 U/L
BACTERIA: NEGATIVE /HPF
BILIRUB SERPL-MCNC: 0.3 MG/DL
BILIRUBIN URINE: NEGATIVE
BLOOD URINE: NEGATIVE
BUN SERPL-MCNC: 14 MG/DL
C3 SERPL-MCNC: 107 MG/DL
C4 SERPL-MCNC: 17 MG/DL
CALCIUM SERPL-MCNC: 9.3 MG/DL
CAST: 0 /LPF
CHLORIDE SERPL-SCNC: 101 MMOL/L
CK SERPL-CCNC: 378 U/L
CO2 SERPL-SCNC: 22 MMOL/L
COLOR: YELLOW
CREAT SERPL-MCNC: 1.07 MG/DL
CREAT SPEC-SCNC: 268 MG/DL
CREAT/PROT UR: 0.1 RATIO
EGFR: 95 ML/MIN/1.73M2
EPITHELIAL CELLS: 0 /HPF
GLUCOSE QUALITATIVE U: NEGATIVE MG/DL
KETONES URINE: NEGATIVE MG/DL
LEUKOCYTE ESTERASE URINE: NEGATIVE
MICROSCOPIC-UA: NORMAL
NITRITE URINE: NEGATIVE
PH URINE: 5.5
POTASSIUM SERPL-SCNC: 4.1 MMOL/L
PROT SERPL-MCNC: 7 G/DL
PROT UR-MCNC: 18 MG/DL
PROTEIN URINE: 30 MG/DL
RED BLOOD CELLS URINE: 2 /HPF
SODIUM SERPL-SCNC: 140 MMOL/L
SPECIFIC GRAVITY URINE: 1.03
UROBILINOGEN URINE: 0.2 MG/DL
WHITE BLOOD CELLS URINE: 0 /HPF

## 2024-01-18 LAB — DSDNA AB SER-ACNC: 169 IU/ML

## 2024-02-20 ENCOUNTER — APPOINTMENT (OUTPATIENT)
Dept: RHEUMATOLOGY | Facility: CLINIC | Age: 34
End: 2024-02-20
Payer: MEDICAID

## 2024-02-20 ENCOUNTER — LABORATORY RESULT (OUTPATIENT)
Age: 34
End: 2024-02-20

## 2024-02-20 VITALS
HEART RATE: 84 BPM | OXYGEN SATURATION: 97 % | SYSTOLIC BLOOD PRESSURE: 106 MMHG | TEMPERATURE: 98 F | RESPIRATION RATE: 16 BRPM | DIASTOLIC BLOOD PRESSURE: 67 MMHG

## 2024-02-20 VITALS
HEART RATE: 73 BPM | SYSTOLIC BLOOD PRESSURE: 100 MMHG | RESPIRATION RATE: 16 BRPM | DIASTOLIC BLOOD PRESSURE: 62 MMHG | OXYGEN SATURATION: 98 %

## 2024-02-20 PROCEDURE — 96413 CHEMO IV INFUSION 1 HR: CPT

## 2024-02-20 RX ORDER — BELIMUMAB 400 MG/5ML
400 INJECTION, POWDER, LYOPHILIZED, FOR SOLUTION INTRAVENOUS
Qty: 1 | Refills: 0 | Status: COMPLETED
Start: 2023-12-13

## 2024-02-20 NOTE — HISTORY OF PRESENT ILLNESS
[Denies] : Denies [N/A] : N/A [No] : No [Yes] : Yes [24g] : 24g [Start Time: ___] : Medication Start Time: [unfilled] [End Time: ___] : Medication End Time: [unfilled] [Medication Name: ___] : Medication Name: [unfilled] [Total Amount Administered: ___] : Total Amount Administered: [unfilled] [IV discontinued. Intact. No signs or symptoms of IV complications noted. Time: ___] : IV discontinued. Intact. No signs or symptoms of IV complications noted. Time: [unfilled] [Patient  instructed to seek medical attention with signs and symptoms of adverse effects] : Patient  instructed to seek medical attention with signs and symptoms of adverse effects [Patient left unit in no acute distress] : Patient left unit in no acute distress [Medications administered as ordered and tolerated well.] : Medications administered as ordered and tolerated well. [Blood drawn at time of visit] : Blood drawn at time of visit [de-identified] : URI and cyst [de-identified] : rash to face [de-identified] : right hand radial vein [de-identified] : labs drawn as prescribed [de-identified] : Patient presents for scheduled Benlysta infusion, ambulatory in [facility]. Patient reports recovering from URI and also taking ABX for cyst to the right lower back. Patient reports continues to reports fatigue and mild rash to face. Patient admits taking Eliquis instead Coumadin/ denies any s/s of bleeding. Patient denies of having any other complaints or symptoms today. Medication administered as prescribed, and infusion tolerated well. Patient left unit ambulatory in [facility].

## 2024-02-21 LAB
ALBUMIN SERPL ELPH-MCNC: 4.5 G/DL
ALP BLD-CCNC: 85 U/L
ALT SERPL-CCNC: 22 U/L
ANION GAP SERPL CALC-SCNC: 16 MMOL/L
APPEARANCE: CLEAR
AST SERPL-CCNC: 28 U/L
BACTERIA: NEGATIVE /HPF
BILIRUB SERPL-MCNC: 0.5 MG/DL
BILIRUBIN URINE: NEGATIVE
BLOOD URINE: NEGATIVE
BUN SERPL-MCNC: 11 MG/DL
C3 SERPL-MCNC: 118 MG/DL
C4 SERPL-MCNC: 22 MG/DL
CALCIUM SERPL-MCNC: 10.1 MG/DL
CAST: 0 /LPF
CHLORIDE SERPL-SCNC: 100 MMOL/L
CK SERPL-CCNC: 525 U/L
CO2 SERPL-SCNC: 24 MMOL/L
COLOR: YELLOW
CREAT SERPL-MCNC: 1.08 MG/DL
CREAT SPEC-SCNC: 147 MG/DL
CREAT/PROT UR: 0.1 RATIO
DSDNA AB SER-ACNC: 110 IU/ML
EGFR: 94 ML/MIN/1.73M2
EPITHELIAL CELLS: 0 /HPF
GLUCOSE QUALITATIVE U: NEGATIVE MG/DL
KETONES URINE: NEGATIVE MG/DL
LEUKOCYTE ESTERASE URINE: NEGATIVE
MICROSCOPIC-UA: NORMAL
NITRITE URINE: NEGATIVE
PH URINE: 5.5
POTASSIUM SERPL-SCNC: 4.2 MMOL/L
PROT SERPL-MCNC: 7 G/DL
PROT UR-MCNC: 7 MG/DL
PROTEIN URINE: NEGATIVE MG/DL
RED BLOOD CELLS URINE: 1 /HPF
SODIUM SERPL-SCNC: 141 MMOL/L
SPECIFIC GRAVITY URINE: 1.01
UROBILINOGEN URINE: 0.2 MG/DL
WHITE BLOOD CELLS URINE: 0 /HPF

## 2024-03-04 ENCOUNTER — APPOINTMENT (OUTPATIENT)
Dept: RHEUMATOLOGY | Facility: CLINIC | Age: 34
End: 2024-03-04
Payer: COMMERCIAL

## 2024-03-04 VITALS
BODY MASS INDEX: 30.31 KG/M2 | OXYGEN SATURATION: 97 % | SYSTOLIC BLOOD PRESSURE: 107 MMHG | HEART RATE: 73 BPM | HEIGHT: 68 IN | WEIGHT: 200 LBS | DIASTOLIC BLOOD PRESSURE: 75 MMHG

## 2024-03-04 DIAGNOSIS — Z79.01 LONG TERM (CURRENT) USE OF ANTICOAGULANTS: ICD-10-CM

## 2024-03-04 PROCEDURE — 99214 OFFICE O/P EST MOD 30 MIN: CPT

## 2024-03-04 PROCEDURE — G2211 COMPLEX E/M VISIT ADD ON: CPT

## 2024-03-04 NOTE — ASSESSMENT
[FreeTextEntry1] : 1.  SLE: In 2007 he developed fatigue and chest pain for which he was admitted to [facility].  The details are lacking, but he was diagnosed with pericarditis and myocarditis.  At that time a diagnosis of SLE was made, and he was treated with prednisone and enalapril.  He subsequently developed a butterfly rash, polyarthritis, and Raynauds.  In 2013 he had a DVT of the right leg complicated by a PE.  His disease stabilized, but in April 2016 he flared with marked alopecia, fatigue, and arthritis.  By the summer 2016 his disease was under control.  While serologically active, he had little in the way of active clinical disease until the fall 2016.  He had two admissions to [facility] for fever and cough.  The initial diagnosis was that of a viral infection.  He was discharged but readmitted once again for fever and cough. After an extensive evaluation, which failed to identify an infectious cause, the prednisone was increased to 20 mg/d.  CT scan of the chest failed to demonstrate parenchymal disease.  Since going home he has continued with low grade fever and cough.  No localizing symptoms.  He also had myalgias and arthralgias, profound fatigue, "itchy" hands.  Several changes to medications were made, chief of which was to change to methylprednisolone and increase the dose.  He felt much better with near disappearance of cough, a return of energy, disappearance of fever.  However, he had joint pain and swelling in the hands as well as pain in shoulders, ankles, and knees. He in early Dec 2016 developed cutaneous vasculitis of the legs as well as periungual infarcts. A kidney biopsy was performed because of rising proteinuria (see below).  He enrolled in the BLISS-LN study and did very well.  After the completion of the study, he was maintained on commercial Benlysta and did well.  However, in early 2023 following a sinus infection, his disease seemed to flare with rash, arthritis, increased serologies, and increased proteinuria.  He had missed one dose of belimumab. The MMF dose was increased to 3 g/d.  With the increase he felt a little better with reduced arthritic pain; the rash waxed and waned.  He developed some alopecia.  Although the proteinuria went down, he was still serologically active in May 2023, and I therefore discussed other therapeutic options (dior, CAR-T).   2.  Myocarditis: Cardiac function improved and was normal or close to it after the start of Benlysta.  3.  Pericarditis 4.  DVT/PE: chronically anticoagulated. He saw hematology in Oct 2023 (labs not available)  who suggested stopping warfarin.   5.  Vit D deficiency 6.  Cough:  cause unknown:  consider prolonged viral infection, lisinopril (stopped Nov. 21, 2016), SLE (though CT scan of lung in [facility] did not show parenchymal disease), allergic reaction (cats at home, drugs). Cough disappeared on higher doses of steroids (see above).  7.  Low DLco:  PFT's done in late 2016 demonstrated a DLco of 40% predicted.   8.  Lupus Nephritis: proteinuria raised the question of kidney biopsy.  Proteinuria has been reproducible, and the need for a kidney biopsy was expressed to the patient.  Anticoagulation in the tania-procedure period discussed.  9.  Vasculitis:  s/p skin biopsy performed to confirm the diagnosis of vasculitis.   10.  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  The official report also noted crescents in 2/21 glomeruli.  Various therapeutic options were discussed (higher dose MMF, Cytoxan, study). The decision was to maximize the MMF and screen for the belimumab study.  He  received two 500 mg doses of Solumedrol as well. He enrolled in the BLISS-LN study and did very well.  After the completion of the study, he was maintained on commercial Benlysta and did well.  However, in early 2023 following a sinus infection, his disease seemed to flare with rash, arthritis, increased serologies, and increased proteinuria.  He had missed one dose of belimumab.  With the increase he felt a little better with reduced arthritic pain; the rash waxed and waned.  He developed some alopecia.  Although the proteinuria went down, he was still serologically active in May 2023, and I therefore discussed other therapeutic options (dior, CAR-T).  His nephritis improved in that proteinuria disappeared.  11. Osteonecrosis: onset in hips but also in shoulders/knees.  He was seen at [facility], and according to the patient there was a difference of opinion between two orthopedists about doing a core decompression. Meanwhile, he enrolled in a study of AVN being conducted at [facility]. Surgery is in his future.  12. Fatigue 13. Myopathy: CK in the 2,000's in the spring 2023 most assuredly from an intense exercise program.  14. Anticoagulation, chronic: switched to Eliquis from warfarin  Plan Lab tests Reduce MMF to 2.5 g/d in divided doses High risk medications used in the treatment of rheumatic diseases include steroids, disease modifying agents, immunosuppressive therapies, antimalarials, biologics, and chemotherapy. Regardless of which drug or class of drug, the potential toxicities of these therapies mandate close monitoring in the form of a history, physical, and laboratory tests. Systemic Lupus Erythematosus, known as lupus, is a chronic autoimmune disease that can affect any organ in the body posing threats to proper organ function and even to life. Therefore, close surveillance of all bodily functions is required, including but not limited to central and peripheral nervous system, ocular and auditory systems, cardiopulmonary function, kidney function, mucocutaneous and musculoskeletal systems as well as constitutional manifestations. Surveillance consists of history, physical, and laboratory tests. Treatment varies, but most of the drugs used are high risk and therefore also require close monitoring in the form of blood and urine tests. Shingrix vaccine recommended Needs HCQ level Return 3 months

## 2024-03-04 NOTE — HISTORY OF PRESENT ILLNESS
[FreeTextEntry1] : 1.  SLE: In 2007 he developed fatigue and chest pain for which he was admitted to [facility].  The details are lacking, but he was diagnosed with pericarditis and myocarditis.  At that time a diagnosis of SLE was made, and he was treated with prednisone and enalapril.  He subsequently developed a butterfly rash, polyarthritis, and Raynauds.  In 2013 he had a DVT of the right leg complicated by a PE.  His disease stabilized, but in April 2016 he flared with marked alopecia, fatigue, and arthritis.  By the summer 2016 his disease was under control.  While serologically active, he had little in the way of active clinical disease until the fall 2016.  He had two admissions to [facility] for fever and cough.  The initial diagnosis was that of a viral infection.  He was discharged but readmitted once again for fever and cough. After an extensive evaluation, which failed to identify an infectious cause, the prednisone was increased to 20 mg/d.  CT scan of the chest failed to demonstrate parenchymal disease.  Since going home he has continued with low grade fever and cough.  No localizing symptoms.  He also had myalgias and arthralgias, profound fatigue, "itchy" hands.  Several changes to medications were made, chief of which was to change to methylprednisolone and increase the dose.  He felt much better with near disappearance of cough, a return of energy, disappearance of fever.  However, he had joint pain and swelling in the hands as well as pain in shoulders, ankles, and knees. He in early Dec 2016 developed cutaneous vasculitis of the legs as well as periungual infarcts. A kidney biopsy was performed because of rising proteinuria (see below).  He enrolled in the BLISS-LN study and did very well.  After the completion of the study, he was maintained on commercial Benlysta and did well.  However, in early 2023 following a sinus infection, his disease seemed to flare with rash, arthritis, increased serologies, and increased proteinuria.  He had missed one dose of belimumab. The MMF dose was increased to 3 g/d.  With the increase he felt a little better with reduced arthritic pain; the rash waxed and waned.  He developed some alopecia.  Although the proteinuria went down, he was still serologically active in May 2023, and I therefore discussed other therapeutic options (dior, CAR-T).   2.  Myocarditis: Cardiac function improved and was normal or close to it after the start of Benlysta.  3.  Pericarditis 4.  DVT/PE: chronically anticoagulated. He saw hematology in Oct 2023 (labs not available)  who suggested stopping warfarin.   5.  Vit D deficiency 6.  Cough:  cause unknown:  consider prolonged viral infection, lisinopril (stopped Nov. 21, 2016), SLE (though CT scan of lung in [facility] did not show parenchymal disease), allergic reaction (cats at home, drugs). Cough disappeared on higher doses of steroids (see above).  7.  Low DLco:  PFT's done in late 2016 demonstrated a DLco of 40% predicted.   8.  Lupus Nephritis: proteinuria raised the question of kidney biopsy.  Proteinuria has been reproducible, and the need for a kidney biopsy was expressed to the patient.  Anticoagulation in the tania-procedure period discussed.  9.  Vasculitis:  s/p skin biopsy performed to confirm the diagnosis of vasculitis.   10.  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  The official report also noted crescents in 2/21 glomeruli.  Various therapeutic options were discussed (higher dose MMF, Cytoxan, study). The decision was to maximize the MMF and screen for the belimumab study.  He  received two 500 mg doses of Solumedrol as well. He enrolled in the BLISS-LN study and did very well.  After the completion of the study, he was maintained on commercial Benlysta and did well.  However, in early 2023 following a sinus infection, his disease seemed to flare with rash, arthritis, increased serologies, and increased proteinuria.  He had missed one dose of belimumab.  With the increase he felt a little better with reduced arthritic pain; the rash waxed and waned.  He developed some alopecia.  Although the proteinuria went down, he was still serologically active in May 2023, and I therefore discussed other therapeutic options (dior, CAR-T).  His nephritis improved in that proteinuria disappeared.  11. Osteonecrosis: onset in hips but also in shoulders/knees.  He was seen at [facility], and according to the patient there was a difference of opinion between two orthopedists about doing a core decompression. Meanwhile, he enrolled in a study of AVN being conducted at [facility]. Surgery is in his future.  12. Fatigue 13. Myopathy: CK in the 2,000's in the spring 2023 most assuredly from an intense exercise program.  14. Anticoagulation, chronic: switched to Eliquis from warfarin  Meds lisinopril 10 mg/d   metoprolol 200 mg/d Plaquenil 400 mg/d CellCept 3000 mg/d in divided doses Eliquis Ca Vit D Vit C Effexor 75 mg/d  Vaccines Prevnar 13  8/8/16  ([id number] exp 8/17) PNVX 23 11/13/17 PNVX 23 2/14/23 ([id number]; exp 9/30/23) Fluzone 9/15/16 ([place] 5629 KA; exp 30JUN17) Fluzone Quadrivalent  10/24/2023 (U [id number]; exp 6/30/2024)

## 2024-03-18 DIAGNOSIS — M32.14 GLOMERULAR DISEASE IN SYSTEMIC LUPUS ERYTHEMATOSUS: ICD-10-CM

## 2024-03-21 ENCOUNTER — APPOINTMENT (OUTPATIENT)
Dept: RHEUMATOLOGY | Facility: CLINIC | Age: 34
End: 2024-03-21
Payer: COMMERCIAL

## 2024-03-21 VITALS — SYSTOLIC BLOOD PRESSURE: 106 MMHG | HEART RATE: 63 BPM | OXYGEN SATURATION: 98 % | DIASTOLIC BLOOD PRESSURE: 70 MMHG

## 2024-03-21 VITALS
TEMPERATURE: 98.2 F | RESPIRATION RATE: 16 BRPM | SYSTOLIC BLOOD PRESSURE: 100 MMHG | DIASTOLIC BLOOD PRESSURE: 61 MMHG | HEART RATE: 72 BPM | OXYGEN SATURATION: 96 %

## 2024-03-21 PROCEDURE — 96413 CHEMO IV INFUSION 1 HR: CPT

## 2024-03-21 RX ORDER — BELIMUMAB 400 MG/5ML
400 INJECTION, POWDER, LYOPHILIZED, FOR SOLUTION INTRAVENOUS
Qty: 1 | Refills: 0 | Status: COMPLETED
Start: 2023-12-13

## 2024-03-21 NOTE — HISTORY OF PRESENT ILLNESS
[N/A] : N/A [Denies] : Denies [No] : No [Yes] : Yes [Declined] : Declined [de-identified] : rash to face [24g] : 24g [End Time: ___] : Medication End Time: [unfilled] [Start Time: ___] : Medication Start Time: [unfilled] [Medication Name: ___] : Medication Name: [unfilled] [Total Amount Administered: ___] : Total Amount Administered: [unfilled] [IV discontinued. Intact. No signs or symptoms of IV complications noted. Time: ___] : IV discontinued. Intact. No signs or symptoms of IV complications noted. Time: [unfilled] [Patient left unit in no acute distress] : Patient left unit in no acute distress [Patient  instructed to seek medical attention with signs and symptoms of adverse effects] : Patient  instructed to seek medical attention with signs and symptoms of adverse effects [Medications administered as ordered and tolerated well.] : Medications administered as ordered and tolerated well. [Blood drawn at time of visit] : Blood drawn at time of visit [de-identified] : left forearm  [de-identified] : Patient presents for scheduled Benlysta infusion, ambulatory in [facility]. Patient reports continues to report rash to face, other than that, patient denies any other symptoms or concerns at this time. Medication administered as prescribed, and infusion tolerated well. Patient left unit ambulatory in [facility]. [de-identified] : labs drawn as prescribed

## 2024-03-22 LAB
ALBUMIN SERPL ELPH-MCNC: 4.2 G/DL
ALP BLD-CCNC: 76 U/L
ALT SERPL-CCNC: 12 U/L
ANION GAP SERPL CALC-SCNC: 17 MMOL/L
APPEARANCE: CLEAR
AST SERPL-CCNC: 25 U/L
BACTERIA: NEGATIVE /HPF
BASOPHILS # BLD AUTO: 0.01 K/UL
BASOPHILS NFR BLD AUTO: 0.2 %
BILIRUB SERPL-MCNC: 0.3 MG/DL
BILIRUBIN URINE: NEGATIVE
BLOOD URINE: NEGATIVE
BUN SERPL-MCNC: 11 MG/DL
C3 SERPL-MCNC: 101 MG/DL
C4 SERPL-MCNC: 20 MG/DL
CALCIUM SERPL-MCNC: 9.4 MG/DL
CAST: 0 /LPF
CHLORIDE SERPL-SCNC: 101 MMOL/L
CK SERPL-CCNC: 372 U/L
CO2 SERPL-SCNC: 26 MMOL/L
COLOR: YELLOW
CREAT SERPL-MCNC: 1.07 MG/DL
CREAT SPEC-SCNC: 254 MG/DL
CREAT/PROT UR: 0.1 RATIO
EGFR: 95 ML/MIN/1.73M2
EOSINOPHIL # BLD AUTO: 0.05 K/UL
EOSINOPHIL NFR BLD AUTO: 0.9 %
EPITHELIAL CELLS: 1 /HPF
GLUCOSE QUALITATIVE U: NEGATIVE MG/DL
HCT VFR BLD CALC: 41.4 %
HGB BLD-MCNC: 12.5 G/DL
IMM GRANULOCYTES NFR BLD AUTO: 0.2 %
KETONES URINE: NEGATIVE MG/DL
LEUKOCYTE ESTERASE URINE: NEGATIVE
LYMPHOCYTES # BLD AUTO: 1.08 K/UL
LYMPHOCYTES NFR BLD AUTO: 20.2 %
MAN DIFF?: NORMAL
MCHC RBC-ENTMCNC: 28.4 PG
MCHC RBC-ENTMCNC: 30.2 GM/DL
MCV RBC AUTO: 94.1 FL
MICROSCOPIC-UA: NORMAL
MONOCYTES # BLD AUTO: 0.56 K/UL
MONOCYTES NFR BLD AUTO: 10.5 %
NEUTROPHILS # BLD AUTO: 3.63 K/UL
NEUTROPHILS NFR BLD AUTO: 68 %
NITRITE URINE: NEGATIVE
PH URINE: 5
PLATELET # BLD AUTO: 188 K/UL
POTASSIUM SERPL-SCNC: 3.8 MMOL/L
PROT SERPL-MCNC: 6.7 G/DL
PROT UR-MCNC: 13 MG/DL
PROTEIN URINE: NORMAL MG/DL
RBC # BLD: 4.4 M/UL
RBC # FLD: 13.9 %
RED BLOOD CELLS URINE: 1 /HPF
SODIUM SERPL-SCNC: 143 MMOL/L
SPECIFIC GRAVITY URINE: 1.03
UROBILINOGEN URINE: 0.2 MG/DL
WBC # FLD AUTO: 5.34 K/UL
WHITE BLOOD CELLS URINE: 1 /HPF

## 2024-03-23 LAB — DSDNA AB SER-ACNC: 89 IU/ML

## 2024-03-26 LAB — HYDROXYCHLOROQUINE CONCENTRATION: 1162 NG/ML

## 2024-04-16 ENCOUNTER — APPOINTMENT (OUTPATIENT)
Dept: RHEUMATOLOGY | Facility: CLINIC | Age: 34
End: 2024-04-16
Payer: COMMERCIAL

## 2024-04-16 VITALS
HEART RATE: 62 BPM | RESPIRATION RATE: 16 BRPM | SYSTOLIC BLOOD PRESSURE: 115 MMHG | DIASTOLIC BLOOD PRESSURE: 72 MMHG | OXYGEN SATURATION: 99 %

## 2024-04-16 VITALS
TEMPERATURE: 98 F | SYSTOLIC BLOOD PRESSURE: 125 MMHG | DIASTOLIC BLOOD PRESSURE: 78 MMHG | HEART RATE: 69 BPM | OXYGEN SATURATION: 97 % | RESPIRATION RATE: 16 BRPM

## 2024-04-16 PROCEDURE — 96413 CHEMO IV INFUSION 1 HR: CPT

## 2024-04-16 RX ORDER — BELIMUMAB 400 MG/5ML
400 INJECTION, POWDER, LYOPHILIZED, FOR SOLUTION INTRAVENOUS
Qty: 1 | Refills: 0 | Status: COMPLETED
Start: 2023-12-13

## 2024-04-16 NOTE — HISTORY OF PRESENT ILLNESS
[6] : 6 [N/A] : N/A [Denies] : Denies [No] : No [Yes] : Yes [de-identified] : post implant placement  pain [de-identified] : rash to face [24g] : 24g [Start Time: ___] : Medication Start Time: [unfilled] [End Time: ___] : Medication End Time: [unfilled] [Medication Name: ___] : Medication Name: [unfilled] [Total Amount Administered: ___] : Total Amount Administered: [unfilled] [IV discontinued. Intact. No signs or symptoms of IV complications noted. Time: ___] : IV discontinued. Intact. No signs or symptoms of IV complications noted. Time: [unfilled] [Patient  instructed to seek medical attention with signs and symptoms of adverse effects] : Patient  instructed to seek medical attention with signs and symptoms of adverse effects [Patient left unit in no acute distress] : Patient left unit in no acute distress [Medications administered as ordered and tolerated well.] : Medications administered as ordered and tolerated well. [Blood drawn at time of visit] : Blood drawn at time of visit [de-identified] : left hand radial vein [de-identified] : labs drawn as prescribed [de-identified] : Patient presents for scheduled Benlysta infusion, ambulatory in [facility]. Patient reports having implant placement procedure and currently on ABX prophylactically x 7 days. Reports pain as rated above. Patient continues to reports fatigue and mild rash to face. Patient admits taking Eliquis instead Coumadin/ denies any s/s of bleeding. Patient denies of having any other complaints or symptoms today. Medication administered as prescribed, and infusion tolerated well. Patient left unit ambulatory in [facility].

## 2024-04-17 LAB
ALBUMIN SERPL ELPH-MCNC: 4.2 G/DL
ALP BLD-CCNC: 81 U/L
ALT SERPL-CCNC: 14 U/L
ANION GAP SERPL CALC-SCNC: 19 MMOL/L
APPEARANCE: CLEAR
AST SERPL-CCNC: 38 U/L
BACTERIA: NEGATIVE /HPF
BASOPHILS # BLD AUTO: 0.02 K/UL
BASOPHILS NFR BLD AUTO: 0.4 %
BILIRUB SERPL-MCNC: 0.4 MG/DL
BILIRUBIN URINE: NEGATIVE
BLOOD URINE: NEGATIVE
BUN SERPL-MCNC: 10 MG/DL
CALCIUM OXALATE CRYSTALS: PRESENT
CALCIUM SERPL-MCNC: 9.5 MG/DL
CAST: 0 /LPF
CHLORIDE SERPL-SCNC: 101 MMOL/L
CK SERPL-CCNC: 340 U/L
CO2 SERPL-SCNC: 22 MMOL/L
COLOR: NORMAL
CREAT SERPL-MCNC: 1.03 MG/DL
CREAT SPEC-SCNC: 354 MG/DL
CREAT/PROT UR: 0.1 RATIO
DSDNA AB SER-ACNC: 70 IU/ML
EGFR: 99 ML/MIN/1.73M2
EOSINOPHIL # BLD AUTO: 0.01 K/UL
EOSINOPHIL NFR BLD AUTO: 0.2 %
EPITHELIAL CELLS: 0 /HPF
GLUCOSE QUALITATIVE U: NEGATIVE MG/DL
HCT VFR BLD CALC: 41.9 %
HGB BLD-MCNC: 12.7 G/DL
IMM GRANULOCYTES NFR BLD AUTO: 0 %
KETONES URINE: ABNORMAL MG/DL
LEUKOCYTE ESTERASE URINE: NEGATIVE
LYMPHOCYTES # BLD AUTO: 1.15 K/UL
LYMPHOCYTES NFR BLD AUTO: 22.8 %
MAN DIFF?: NORMAL
MCHC RBC-ENTMCNC: 28.6 PG
MCHC RBC-ENTMCNC: 30.3 GM/DL
MCV RBC AUTO: 94.4 FL
MICROSCOPIC-UA: NORMAL
MONOCYTES # BLD AUTO: 0.46 K/UL
MONOCYTES NFR BLD AUTO: 9.1 %
NEUTROPHILS # BLD AUTO: 3.4 K/UL
NEUTROPHILS NFR BLD AUTO: 67.5 %
NITRITE URINE: NEGATIVE
PH URINE: 5.5
PLATELET # BLD AUTO: 178 K/UL
POTASSIUM SERPL-SCNC: 4.9 MMOL/L
PROT SERPL-MCNC: 6.8 G/DL
PROT UR-MCNC: 24 MG/DL
PROTEIN URINE: NORMAL MG/DL
RBC # BLD: 4.44 M/UL
RBC # FLD: 14.2 %
RED BLOOD CELLS URINE: 3 /HPF
REVIEW: NORMAL
SODIUM SERPL-SCNC: 142 MMOL/L
SPECIFIC GRAVITY URINE: >1.03
UROBILINOGEN URINE: 0.2 MG/DL
WBC # FLD AUTO: 5.04 K/UL
WHITE BLOOD CELLS URINE: 0 /HPF

## 2024-04-18 LAB
C3 SERPL-MCNC: 111 MG/DL
C4 SERPL-MCNC: 21 MG/DL

## 2024-05-03 ENCOUNTER — RX RENEWAL (OUTPATIENT)
Age: 34
End: 2024-05-03

## 2024-05-14 ENCOUNTER — APPOINTMENT (OUTPATIENT)
Dept: RHEUMATOLOGY | Facility: CLINIC | Age: 34
End: 2024-05-14
Payer: COMMERCIAL

## 2024-05-14 VITALS
RESPIRATION RATE: 16 BRPM | SYSTOLIC BLOOD PRESSURE: 105 MMHG | DIASTOLIC BLOOD PRESSURE: 68 MMHG | HEART RATE: 75 BPM | OXYGEN SATURATION: 98 %

## 2024-05-14 VITALS
DIASTOLIC BLOOD PRESSURE: 59 MMHG | HEART RATE: 80 BPM | RESPIRATION RATE: 16 BRPM | TEMPERATURE: 98.3 F | SYSTOLIC BLOOD PRESSURE: 97 MMHG | OXYGEN SATURATION: 98 %

## 2024-05-14 PROCEDURE — 96413 CHEMO IV INFUSION 1 HR: CPT

## 2024-05-14 RX ORDER — BELIMUMAB 400 MG/5ML
400 INJECTION, POWDER, LYOPHILIZED, FOR SOLUTION INTRAVENOUS
Qty: 1 | Refills: 0 | Status: COMPLETED
Start: 2023-12-13

## 2024-05-14 NOTE — HISTORY OF PRESENT ILLNESS
[N/A] : N/A [Denies] : Denies [No] : No [Yes] : Yes [Informed consent documented in EHR.] : Informed consent documented in EHR. [de-identified] : rash to face [24g] : 24g [Start Time: ___] : Medication Start Time: [unfilled] [End Time: ___] : Medication End Time: [unfilled] [Medication Name: ___] : Medication Name: [unfilled] [Total Amount Administered: ___] : Total Amount Administered: [unfilled] [IV discontinued. Intact. No signs or symptoms of IV complications noted. Time: ___] : IV discontinued. Intact. No signs or symptoms of IV complications noted. Time: [unfilled] [Patient  instructed to seek medical attention with signs and symptoms of adverse effects] : Patient  instructed to seek medical attention with signs and symptoms of adverse effects [Patient left unit in no acute distress] : Patient left unit in no acute distress [Medications administered as ordered and tolerated well.] : Medications administered as ordered and tolerated well. [Blood drawn at time of visit] : Blood drawn at time of visit [de-identified] : left hand radial vein [de-identified] : labs drawn as prescribed [de-identified] : Patient presents for scheduled Benlysta infusion, ambulatory in [facility]. Patient continues to reports fatigue and mild rash to face. Patient admits taking Eliquis instead Coumadin/ denies any s/s of bleeding. Patient denies of having any other complaints or symptoms today. Medication administered as prescribed, and infusion tolerated well. Patient left unit ambulatory in [facility].

## 2024-05-15 LAB
ALBUMIN SERPL ELPH-MCNC: 4.5 G/DL
ALP BLD-CCNC: 92 U/L
ALT SERPL-CCNC: 11 U/L
ANION GAP SERPL CALC-SCNC: 22 MMOL/L
APPEARANCE: CLEAR
AST SERPL-CCNC: 26 U/L
BACTERIA: NEGATIVE /HPF
BASOPHILS # BLD AUTO: 0.02 K/UL
BASOPHILS NFR BLD AUTO: 0.3 %
BILIRUB SERPL-MCNC: 0.4 MG/DL
BILIRUBIN URINE: NEGATIVE
BLOOD URINE: NEGATIVE
BUN SERPL-MCNC: 11 MG/DL
C3 SERPL-MCNC: 99 MG/DL
C4 SERPL-MCNC: 21 MG/DL
CALCIUM SERPL-MCNC: 9.8 MG/DL
CAST: 2 /LPF
CHLORIDE SERPL-SCNC: 101 MMOL/L
CK SERPL-CCNC: 270 U/L
CO2 SERPL-SCNC: 20 MMOL/L
COLOR: YELLOW
CREAT SERPL-MCNC: 1.19 MG/DL
CREAT SPEC-SCNC: 300 MG/DL
CREAT/PROT UR: 0.1 RATIO
DSDNA AB SER-ACNC: 67 IU/ML
EGFR: 83 ML/MIN/1.73M2
EOSINOPHIL # BLD AUTO: 0.07 K/UL
EOSINOPHIL NFR BLD AUTO: 1.1 %
EPITHELIAL CELLS: 2 /HPF
GLUCOSE QUALITATIVE U: NEGATIVE MG/DL
HCT VFR BLD CALC: 44.4 %
HGB BLD-MCNC: 13.6 G/DL
IMM GRANULOCYTES NFR BLD AUTO: 0.2 %
KETONES URINE: ABNORMAL MG/DL
LEUKOCYTE ESTERASE URINE: NEGATIVE
LYMPHOCYTES # BLD AUTO: 1.34 K/UL
LYMPHOCYTES NFR BLD AUTO: 20.4 %
MAN DIFF?: NORMAL
MCHC RBC-ENTMCNC: 29 PG
MCHC RBC-ENTMCNC: 30.6 GM/DL
MCV RBC AUTO: 94.7 FL
MICROSCOPIC-UA: NORMAL
MONOCYTES # BLD AUTO: 0.46 K/UL
MONOCYTES NFR BLD AUTO: 7 %
NEUTROPHILS # BLD AUTO: 4.66 K/UL
NEUTROPHILS NFR BLD AUTO: 71 %
NITRITE URINE: NEGATIVE
PH URINE: 5.5
PLATELET # BLD AUTO: 168 K/UL
POTASSIUM SERPL-SCNC: 3.8 MMOL/L
PROT SERPL-MCNC: 7 G/DL
PROT UR-MCNC: 15 MG/DL
PROTEIN URINE: NORMAL MG/DL
RBC # BLD: 4.69 M/UL
RBC # FLD: 13.7 %
RED BLOOD CELLS URINE: 1 /HPF
SODIUM SERPL-SCNC: 143 MMOL/L
SPECIFIC GRAVITY URINE: 1.03
UROBILINOGEN URINE: 0.2 MG/DL
WBC # FLD AUTO: 6.56 K/UL
WHITE BLOOD CELLS URINE: 0 /HPF

## 2024-05-28 ENCOUNTER — RX RENEWAL (OUTPATIENT)
Age: 34
End: 2024-05-28

## 2024-05-29 NOTE — PATIENT PROFILE ADULT. - PATIENT REPRESENTATIVE PHONE
Cholesterol is significantly high.  I will recommend working on low-fat diet and regular exercise.  We can refer him to dietitian also.  Will recommend repeating fasting lipid profile in 6 months.  Rest of the labs are normal. [phone]

## 2024-06-04 ENCOUNTER — APPOINTMENT (OUTPATIENT)
Dept: RHEUMATOLOGY | Facility: CLINIC | Age: 34
End: 2024-06-04
Payer: COMMERCIAL

## 2024-06-04 VITALS
DIASTOLIC BLOOD PRESSURE: 78 MMHG | WEIGHT: 175 LBS | TEMPERATURE: 98.6 F | RESPIRATION RATE: 16 BRPM | BODY MASS INDEX: 26.52 KG/M2 | HEART RATE: 110 BPM | OXYGEN SATURATION: 98 % | HEIGHT: 68 IN | SYSTOLIC BLOOD PRESSURE: 130 MMHG

## 2024-06-04 DIAGNOSIS — M32.9 SYSTEMIC LUPUS ERYTHEMATOSUS, UNSPECIFIED: ICD-10-CM

## 2024-06-04 DIAGNOSIS — M87.9 OSTEONECROSIS, UNSPECIFIED: ICD-10-CM

## 2024-06-04 DIAGNOSIS — Z79.899 OTHER LONG TERM (CURRENT) DRUG THERAPY: ICD-10-CM

## 2024-06-04 DIAGNOSIS — Z79.60 LONG TERM (CURRENT) USE OF UNSPECIFIED IMMUNOMODULATORS AND IMMUNOSUPPRESSANTS: ICD-10-CM

## 2024-06-04 DIAGNOSIS — L30.9 DERMATITIS, UNSPECIFIED: ICD-10-CM

## 2024-06-04 PROCEDURE — G2211 COMPLEX E/M VISIT ADD ON: CPT

## 2024-06-04 PROCEDURE — 99215 OFFICE O/P EST HI 40 MIN: CPT

## 2024-06-04 NOTE — ASSESSMENT
[FreeTextEntry1] : 1.  SLE: In 2007 he developed fatigue and chest pain for which he was admitted to [facility].  The details are lacking, but he was diagnosed with pericarditis and myocarditis.  At that time a diagnosis of SLE was made, and he was treated with prednisone and enalapril.  He subsequently developed a butterfly rash, polyarthritis, and Raynauds.  In 2013 he had a DVT of the right leg complicated by a PE.  His disease stabilized, but in April 2016 he flared with marked alopecia, fatigue, and arthritis.  By the summer 2016 his disease was under control.  While serologically active, he had little in the way of active clinical disease until the fall 2016.  He had two admissions to [facility] for fever and cough.  The initial diagnosis was that of a viral infection.  He was discharged but readmitted once again for fever and cough. After an extensive evaluation, which failed to identify an infectious cause, the prednisone was increased to 20 mg/d.  CT scan of the chest failed to demonstrate parenchymal disease.  Since going home he has continued with low grade fever and cough.  No localizing symptoms.  He also had myalgias and arthralgias, profound fatigue, "itchy" hands.  Several changes to medications were made, chief of which was to change to methylprednisolone and increase the dose.  He felt much better with near disappearance of cough, a return of energy, disappearance of fever.  However, he had joint pain and swelling in the hands as well as pain in shoulders, ankles, and knees. He in early Dec 2016 developed cutaneous vasculitis of the legs as well as periungual infarcts. A kidney biopsy was performed because of rising proteinuria (see below).  He enrolled in the BLISS-LN study and did very well.  After the completion of the study, he was maintained on commercial Benlysta and did well.  However, in early 2023 following a sinus infection, his disease seemed to flare with rash, arthritis, increased serologies, and increased proteinuria.  He had missed one dose of belimumab. The MMF dose was increased to 3 g/d.  With the increase he felt a little better with reduced arthritic pain; the rash waxed and waned.  He developed some alopecia.  Although the proteinuria went down, he was still serologically active in May 2023, and I therefore discussed other therapeutic options (dior, CAR-T).  His renal disease seemed to improve further.  In Jun 2024 after visiting [facility] (very red), he developed rash on his face and upper arms. He started Opzelua cream (JAK1/2 inh) with minimal improvement, although he had only used it for two weeks on the face and not at all on the arms.  2.  Myocarditis: Cardiac function improved and was normal or close to it after the start of Benlysta.  3.  Pericarditis 4.  DVT/PE: chronically anticoagulated. He saw hematology in Oct 2023 (labs not available)  who suggested stopping warfarin.   5.  Vit D deficiency 6.  Cough:  cause unknown:  consider prolonged viral infection, lisinopril (stopped Nov. 21, 2016), SLE (though CT scan of lung in [facility] did not show parenchymal disease), allergic reaction (cats at home, drugs). Cough disappeared on higher doses of steroids (see above).  7.  Low DLco:  PFT's done in late 2016 demonstrated a DLco of 40% predicted.   8.  Lupus Nephritis: proteinuria raised the question of kidney biopsy.  Proteinuria has been reproducible, and the need for a kidney biopsy was expressed to the patient.  Anticoagulation in the tania-procedure period discussed.  9.  Vasculitis:  s/p skin biopsy performed to confirm the diagnosis of vasculitis.   10.  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  The official report also noted crescents in 2/21 glomeruli.  Various therapeutic options were discussed (higher dose MMF, Cytoxan, study). The decision was to maximize the MMF and screen for the belimumab study.  He  received two 500 mg doses of Solumedrol as well. He enrolled in the BLISS-LN study and did very well.  After the completion of the study, he was maintained on commercial Benlysta and did well.  However, in early 2023 following a sinus infection, his disease seemed to flare with rash, arthritis, increased serologies, and increased proteinuria.  He had missed one dose of belimumab.  With the increase he felt a little better with reduced arthritic pain; the rash waxed and waned.  He developed some alopecia.  Although the proteinuria went down, he was still serologically active in May 2023, and I therefore discussed other therapeutic options (dior, CAR-T).  His nephritis improved in that proteinuria disappeared.  11. Osteonecrosis: onset in hips but also in shoulders/knees.  He was seen at [facility], and according to the patient there was a difference of opinion between two orthopedists about doing a core decompression. Meanwhile, he enrolled in a study of AVN being conducted at [facility]. Surgery is in his future.  12. Fatigue 13. Myopathy: CK in the 2,000's in the spring 2023 most assuredly from an intense exercise program.  14. Anticoagulation, chronic: switched to Eliquis from warfarin  Plan Lab tests reviewed Continue MMF 2.5 g/d in divided doses High risk medications used in the treatment of rheumatic diseases include steroids, disease modifying agents, immunosuppressive therapies, antimalarials, biologics, and chemotherapy. Regardless of which drug or class of drug, the potential toxicities of these therapies mandate close monitoring in the form of a history, physical, and laboratory tests. Systemic Lupus Erythematosus, known as lupus, is a chronic autoimmune disease that can affect any organ in the body posing threats to proper organ function and even to life. Therefore, close surveillance of all bodily functions is required, including but not limited to central and peripheral nervous system, ocular and auditory systems, cardiopulmonary function, kidney function, mucocutaneous and musculoskeletal systems as well as constitutional manifestations. Surveillance consists of history, physical, and laboratory tests. Treatment varies, but most of the drugs used are high risk and therefore also require close monitoring in the form of blood and urine tests. Shingrix vaccine recommended Needs HCQ level Continue Opzelura Sun protection Return 1 month

## 2024-06-04 NOTE — HISTORY OF PRESENT ILLNESS
[FreeTextEntry1] : 1.  SLE: In 2007 he developed fatigue and chest pain for which he was admitted to [facility].  The details are lacking, but he was diagnosed with pericarditis and myocarditis.  At that time a diagnosis of SLE was made, and he was treated with prednisone and enalapril.  He subsequently developed a butterfly rash, polyarthritis, and Raynauds.  In 2013 he had a DVT of the right leg complicated by a PE.  His disease stabilized, but in April 2016 he flared with marked alopecia, fatigue, and arthritis.  By the summer 2016 his disease was under control.  While serologically active, he had little in the way of active clinical disease until the fall 2016.  He had two admissions to [facility] for fever and cough.  The initial diagnosis was that of a viral infection.  He was discharged but readmitted once again for fever and cough. After an extensive evaluation, which failed to identify an infectious cause, the prednisone was increased to 20 mg/d.  CT scan of the chest failed to demonstrate parenchymal disease.  Since going home he has continued with low grade fever and cough.  No localizing symptoms.  He also had myalgias and arthralgias, profound fatigue, "itchy" hands.  Several changes to medications were made, chief of which was to change to methylprednisolone and increase the dose.  He felt much better with near disappearance of cough, a return of energy, disappearance of fever.  However, he had joint pain and swelling in the hands as well as pain in shoulders, ankles, and knees. He in early Dec 2016 developed cutaneous vasculitis of the legs as well as periungual infarcts. A kidney biopsy was performed because of rising proteinuria (see below).  He enrolled in the BLISS-LN study and did very well.  After the completion of the study, he was maintained on commercial Benlysta and did well.  However, in early 2023 following a sinus infection, his disease seemed to flare with rash, arthritis, increased serologies, and increased proteinuria.  He had missed one dose of belimumab. The MMF dose was increased to 3 g/d.  With the increase he felt a little better with reduced arthritic pain; the rash waxed and waned.  He developed some alopecia.  Although the proteinuria went down, he was still serologically active in May 2023, and I therefore discussed other therapeutic options (dior, CAR-T).  His renal disease seemed to improve further.  In Jun 2024 after visiting [facility] (very red), he developed rash on his face and upper arms. He started Opzelua cream (JAK1/2 inh) with minimal improvement, although he had only used it for two weeks on the face and not at all on the arms.  2.  Myocarditis: Cardiac function improved and was normal or close to it after the start of Benlysta.  3.  Pericarditis 4.  DVT/PE: chronically anticoagulated. He saw hematology in Oct 2023 (labs not available)  who suggested stopping warfarin.   5.  Vit D deficiency 6.  Cough:  cause unknown:  consider prolonged viral infection, lisinopril (stopped Nov. 21, 2016), SLE (though CT scan of lung in [facility] did not show parenchymal disease), allergic reaction (cats at home, drugs). Cough disappeared on higher doses of steroids (see above).  7.  Low DLco:  PFT's done in late 2016 demonstrated a DLco of 40% predicted.   8.  Lupus Nephritis: proteinuria raised the question of kidney biopsy.  Proteinuria has been reproducible, and the need for a kidney biopsy was expressed to the patient.  Anticoagulation in the tania-procedure period discussed.  9.  Vasculitis:  s/p skin biopsy performed to confirm the diagnosis of vasculitis.   10.  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  The official report also noted crescents in 2/21 glomeruli.  Various therapeutic options were discussed (higher dose MMF, Cytoxan, study). The decision was to maximize the MMF and screen for the belimumab study.  He  received two 500 mg doses of Solumedrol as well. He enrolled in the BLISS-LN study and did very well.  After the completion of the study, he was maintained on commercial Benlysta and did well.  However, in early 2023 following a sinus infection, his disease seemed to flare with rash, arthritis, increased serologies, and increased proteinuria.  He had missed one dose of belimumab.  With the increase he felt a little better with reduced arthritic pain; the rash waxed and waned.  He developed some alopecia.  Although the proteinuria went down, he was still serologically active in May 2023, and I therefore discussed other therapeutic options (dior, CAR-T).  His nephritis improved in that proteinuria disappeared.  11. Osteonecrosis: onset in hips but also in shoulders/knees.  He was seen at [facility], and according to the patient there was a difference of opinion between two orthopedists about doing a core decompression. Meanwhile, he enrolled in a study of AVN being conducted at [facility]. Surgery is in his future.  12. Fatigue 13. Myopathy: CK in the 2,000's in the spring 2023 most assuredly from an intense exercise program.  14. Anticoagulation, chronic: switched to Eliquis from warfarin  Meds lisinopril 10 mg/d   metoprolol 200 mg/d Plaquenil 400 mg/d CellCept 2500 mg/d in divided doses Eliquis Ca Vit D Vit C Effexor 150 mg/d Opzelura  Vaccines Prevnar 13  8/8/16  ([id number] exp 8/17) PNVX 23 11/13/17 PNVX 23 2/14/23 ([id number]; exp 9/30/23) Fluzone 9/15/16 ([place] 5629 KA; exp 30JUN17) Fluzone Quadrivalent  10/24/2023 (U [id number]; exp 6/30/2024)

## 2024-06-04 NOTE — PHYSICAL EXAM
[General Appearance - Alert] : alert [General Appearance - In No Acute Distress] : in no acute distress [General Appearance - Well Nourished] : well nourished [Sclera] : the sclera and conjunctiva were normal [Extraocular Movements] : extraocular movements were intact [Strabismus] : no strabismus was seen [Outer Ear] : the ears and nose were normal in appearance [Nasal Cavity] : the nasal mucosa and septum were normal [Oropharynx] : the oropharynx was normal [Neck Appearance] : the appearance of the neck was normal [Neck Cervical Mass (___cm)] : no neck mass was observed [Jugular Venous Distention Increased] : there was no jugular-venous distention [Thyroid Diffuse Enlargement] : the thyroid was not enlarged [Auscultation Breath Sounds / Voice Sounds] : lungs were clear to auscultation bilaterally [Heart Rate And Rhythm] : heart rate was normal and rhythm regular [Heart Sounds] : normal S1 and S2 [Heart Sounds Gallop] : no gallops [Murmurs] : no murmurs [Heart Sounds Pericardial Friction Rub] : no pericardial rub [Arterial Pulses Carotid] : carotid pulses were normal with no bruits [Full Pulse] : the pedal pulses are present [Edema] : there was no peripheral edema [Abdomen Soft] : soft [Abdomen Tenderness] : non-tender [] : no hepato-splenomegaly [Abdomen Mass (___ Cm)] : no abdominal mass palpated [Cervical Lymph Nodes Enlarged Posterior Bilaterally] : posterior cervical [Cervical Lymph Nodes Enlarged Anterior Bilaterally] : anterior cervical [Supraclavicular Lymph Nodes Enlarged Bilaterally] : supraclavicular [No CVA Tenderness] : no ~M costovertebral angle tenderness [No Spinal Tenderness] : no spinal tenderness [Skin Color & Pigmentation] : normal skin color and pigmentation [Skin Turgor] : normal skin turgor [FreeTextEntry1] : facial rash as well as rash on both proximal UE's [Sensation] : the sensory exam was normal to light touch and pinprick [Motor Exam] : the motor exam was normal [No Focal Deficits] : no focal deficits [Oriented To Time, Place, And Person] : oriented to person, place, and time [Impaired Insight] : insight and judgment were intact [Abnormal Walk] : normal gait [Affect] : the affect was normal [Nail Clubbing] : no clubbing  or cyanosis of the fingernails [Musculoskeletal - Swelling] : no joint swelling seen [Motor Tone] : muscle strength and tone were normal

## 2024-06-05 ENCOUNTER — APPOINTMENT (OUTPATIENT)
Dept: UROLOGY | Facility: CLINIC | Age: 34
End: 2024-06-05

## 2024-06-12 ENCOUNTER — LABORATORY RESULT (OUTPATIENT)
Age: 34
End: 2024-06-12

## 2024-06-12 ENCOUNTER — APPOINTMENT (OUTPATIENT)
Dept: RHEUMATOLOGY | Facility: CLINIC | Age: 34
End: 2024-06-12
Payer: COMMERCIAL

## 2024-06-12 VITALS
DIASTOLIC BLOOD PRESSURE: 68 MMHG | OXYGEN SATURATION: 99 % | SYSTOLIC BLOOD PRESSURE: 108 MMHG | TEMPERATURE: 98.1 F | HEART RATE: 85 BPM | RESPIRATION RATE: 16 BRPM

## 2024-06-12 VITALS — DIASTOLIC BLOOD PRESSURE: 73 MMHG | SYSTOLIC BLOOD PRESSURE: 112 MMHG | HEART RATE: 69 BPM

## 2024-06-12 PROCEDURE — 96413 CHEMO IV INFUSION 1 HR: CPT

## 2024-06-12 RX ORDER — BELIMUMAB 400 MG/5ML
400 INJECTION, POWDER, LYOPHILIZED, FOR SOLUTION INTRAVENOUS
Qty: 1 | Refills: 0 | Status: COMPLETED
Start: 2023-12-13

## 2024-06-12 NOTE — HISTORY OF PRESENT ILLNESS
[N/A] : N/A [Denies] : Denies [No] : No [Yes] : Yes [Declined] : Declined [Informed consent documented in EHR.] : Informed consent documented in EHR. [24g] : 24g [Start Time: ___] : Medication Start Time: [unfilled] [End Time: ___] : Medication End Time: [unfilled] [Medication Name: ___] : Medication Name: [unfilled] [Total Amount Administered: ___] : Total Amount Administered: [unfilled] [IV discontinued. Intact. No signs or symptoms of IV complications noted. Time: ___] : IV discontinued. Intact. No signs or symptoms of IV complications noted. Time: [unfilled] [Patient  instructed to seek medical attention with signs and symptoms of adverse effects] : Patient  instructed to seek medical attention with signs and symptoms of adverse effects [Patient left unit in no acute distress] : Patient left unit in no acute distress [Medications administered as ordered and tolerated well.] : Medications administered as ordered and tolerated well. [Blood drawn at time of visit] : Blood drawn at time of visit [de-identified] : rash to face and left shoulder  [de-identified] : right forearm [de-identified] : labs drawn as prescribed [de-identified] : Patient presents for scheduled Benlysta infusion, ambulatory in [facility]. Patient continues to report daily fatigue and mild rash to face and left shoulder, treating with cream. Patient admits taking Eliquis and denies any s/s of bleeding. No other complaints or symptoms today. Infusion tolerated well and patient left unit ambulatory in [facility].

## 2024-06-14 LAB
ALBUMIN SERPL ELPH-MCNC: 4.2 G/DL
ALP BLD-CCNC: 85 U/L
ALT SERPL-CCNC: 12 U/L
ANION GAP SERPL CALC-SCNC: 18 MMOL/L
APPEARANCE: CLEAR
AST SERPL-CCNC: 24 U/L
BACTERIA: NEGATIVE /HPF
BASOPHILS # BLD AUTO: 0.02 K/UL
BASOPHILS NFR BLD AUTO: 0.3 %
BILIRUB SERPL-MCNC: 0.4 MG/DL
BILIRUBIN URINE: NEGATIVE
BLOOD URINE: NEGATIVE
BUN SERPL-MCNC: 11 MG/DL
C3 SERPL-MCNC: 94 MG/DL
C4 SERPL-MCNC: 23 MG/DL
CALCIUM OXALATE CRYSTALS: PRESENT
CALCIUM SERPL-MCNC: 9.5 MG/DL
CAST: 1 /LPF
CHLORIDE SERPL-SCNC: 101 MMOL/L
CK SERPL-CCNC: 313 U/L
CO2 SERPL-SCNC: 23 MMOL/L
COLOR: NORMAL
CREAT SERPL-MCNC: 1.22 MG/DL
CREAT SPEC-SCNC: 408 MG/DL
CREAT/PROT UR: 0.1 RATIO
DSDNA AB SER-ACNC: 58 IU/ML
EGFR: 81 ML/MIN/1.73M2
EOSINOPHIL # BLD AUTO: 0.05 K/UL
EOSINOPHIL NFR BLD AUTO: 0.7 %
EPITHELIAL CELLS: 0 /HPF
GLUCOSE QUALITATIVE U: NEGATIVE MG/DL
HCT VFR BLD CALC: 38.7 %
HGB BLD-MCNC: 12.4 G/DL
IMM GRANULOCYTES NFR BLD AUTO: 0.1 %
KETONES URINE: ABNORMAL MG/DL
LEUKOCYTE ESTERASE URINE: NEGATIVE
LYMPHOCYTES # BLD AUTO: 1.28 K/UL
LYMPHOCYTES NFR BLD AUTO: 17.4 %
MAN DIFF?: NORMAL
MCHC RBC-ENTMCNC: 28.8 PG
MCHC RBC-ENTMCNC: 32 GM/DL
MCV RBC AUTO: 89.8 FL
MICROSCOPIC-UA: NORMAL
MONOCYTES # BLD AUTO: 0.61 K/UL
MONOCYTES NFR BLD AUTO: 8.3 %
MUCUS: PRESENT
NEUTROPHILS # BLD AUTO: 5.38 K/UL
NEUTROPHILS NFR BLD AUTO: 73.2 %
NITRITE URINE: NEGATIVE
PH URINE: 5.5
PLATELET # BLD AUTO: 178 K/UL
POTASSIUM SERPL-SCNC: 3.9 MMOL/L
PROT SERPL-MCNC: 6.8 G/DL
PROT UR-MCNC: 19 MG/DL
PROTEIN URINE: 30 MG/DL
RBC # BLD: 4.31 M/UL
RBC # FLD: 13.9 %
RED BLOOD CELLS URINE: 1 /HPF
REVIEW: NORMAL
SODIUM SERPL-SCNC: 141 MMOL/L
SPECIFIC GRAVITY URINE: 1.03
UROBILINOGEN URINE: 0.2 MG/DL
WBC # FLD AUTO: 7.35 K/UL
WHITE BLOOD CELLS URINE: 0 /HPF

## 2024-06-19 RX ORDER — METOPROLOL SUCCINATE 200 MG/1
200 TABLET, EXTENDED RELEASE ORAL
Qty: 90 | Refills: 3 | Status: ACTIVE | COMMUNITY
Start: 2018-12-10 | End: 1900-01-01

## 2024-06-28 ENCOUNTER — RX RENEWAL (OUTPATIENT)
Age: 34
End: 2024-06-28

## 2024-07-04 RX ORDER — BELIMUMAB 400 MG/5ML
400 INJECTION, POWDER, LYOPHILIZED, FOR SOLUTION INTRAVENOUS
Refills: 0 | Status: ACTIVE | OUTPATIENT
Start: 2024-07-03 | End: 1900-01-01

## 2024-07-09 ENCOUNTER — LABORATORY RESULT (OUTPATIENT)
Age: 34
End: 2024-07-09

## 2024-07-09 ENCOUNTER — APPOINTMENT (OUTPATIENT)
Dept: RHEUMATOLOGY | Facility: CLINIC | Age: 34
End: 2024-07-09
Payer: COMMERCIAL

## 2024-07-09 VITALS
RESPIRATION RATE: 16 BRPM | OXYGEN SATURATION: 98 % | DIASTOLIC BLOOD PRESSURE: 69 MMHG | HEART RATE: 52 BPM | SYSTOLIC BLOOD PRESSURE: 106 MMHG

## 2024-07-09 VITALS
TEMPERATURE: 99.1 F | OXYGEN SATURATION: 98 % | RESPIRATION RATE: 16 BRPM | HEART RATE: 107 BPM | SYSTOLIC BLOOD PRESSURE: 113 MMHG | DIASTOLIC BLOOD PRESSURE: 69 MMHG

## 2024-07-09 PROCEDURE — 96413 CHEMO IV INFUSION 1 HR: CPT

## 2024-07-09 RX ORDER — BELIMUMAB 400 MG/5ML
400 INJECTION, POWDER, LYOPHILIZED, FOR SOLUTION INTRAVENOUS
Qty: 1 | Refills: 0 | Status: COMPLETED
Start: 2024-07-03

## 2024-07-10 LAB
ALBUMIN SERPL ELPH-MCNC: 4.2 G/DL
ALP BLD-CCNC: 84 U/L
ALT SERPL-CCNC: 13 U/L
ANION GAP SERPL CALC-SCNC: 18 MMOL/L
APPEARANCE: CLEAR
AST SERPL-CCNC: 24 U/L
BACTERIA: NEGATIVE /HPF
BASOPHILS # BLD AUTO: 0.02 K/UL
BASOPHILS NFR BLD AUTO: 0.4 %
BILIRUB SERPL-MCNC: 0.4 MG/DL
BILIRUBIN URINE: NEGATIVE
BLOOD URINE: NEGATIVE
BUN SERPL-MCNC: 7 MG/DL
CALCIUM SERPL-MCNC: 9.3 MG/DL
CAST: 0 /LPF
CO2 SERPL-SCNC: 24 MMOL/L
COLOR: YELLOW
CREAT SERPL-MCNC: 1.04 MG/DL
CREAT SPEC-SCNC: 255 MG/DL
CREAT/PROT UR: 0.1 RATIO
DSDNA AB SER-ACNC: 53 IU/ML
EGFR: 97 ML/MIN/1.73M2
EOSINOPHIL # BLD AUTO: 0.04 K/UL
EOSINOPHIL NFR BLD AUTO: 0.7 %
EPITHELIAL CELLS: 0 /HPF
GLUCOSE QUALITATIVE U: NEGATIVE MG/DL
HCT VFR BLD CALC: 41.7 %
HGB BLD-MCNC: 12.9 G/DL
IMM GRANULOCYTES NFR BLD AUTO: 0.4 %
LEUKOCYTE ESTERASE URINE: NEGATIVE
LYMPHOCYTES # BLD AUTO: 1.3 K/UL
LYMPHOCYTES NFR BLD AUTO: 23.3 %
MAN DIFF?: NORMAL
MCHC RBC-ENTMCNC: 30.9 GM/DL
MCV RBC AUTO: 94.3 FL
MICROSCOPIC-UA: NORMAL
MONOCYTES # BLD AUTO: 0.49 K/UL
MONOCYTES NFR BLD AUTO: 8.8 %
NEUTROPHILS # BLD AUTO: 3.71 K/UL
NEUTROPHILS NFR BLD AUTO: 66.4 %
NITRITE URINE: NEGATIVE
PH URINE: 6
PLATELET # BLD AUTO: 174 K/UL
POTASSIUM SERPL-SCNC: 3.4 MMOL/L
PROT SERPL-MCNC: 6.7 G/DL
PROT UR-MCNC: 16 MG/DL
PROTEIN URINE: 30 MG/DL
RBC # BLD: 4.42 M/UL
RBC # FLD: 14.7 %
RED BLOOD CELLS URINE: 1 /HPF
SODIUM SERPL-SCNC: 143 MMOL/L
SPECIFIC GRAVITY URINE: 1.02
UROBILINOGEN URINE: 0.2 MG/DL
WBC # FLD AUTO: 5.58 K/UL
WHITE BLOOD CELLS URINE: 0 /HPF

## 2024-07-15 ENCOUNTER — RX RENEWAL (OUTPATIENT)
Age: 34
End: 2024-07-15

## 2024-07-16 ENCOUNTER — APPOINTMENT (OUTPATIENT)
Dept: RHEUMATOLOGY | Facility: CLINIC | Age: 34
End: 2024-07-16
Payer: COMMERCIAL

## 2024-07-16 VITALS
HEART RATE: 50 BPM | DIASTOLIC BLOOD PRESSURE: 68 MMHG | BODY MASS INDEX: 25.76 KG/M2 | RESPIRATION RATE: 16 BRPM | WEIGHT: 170 LBS | HEIGHT: 68 IN | SYSTOLIC BLOOD PRESSURE: 103 MMHG | OXYGEN SATURATION: 94 %

## 2024-07-16 DIAGNOSIS — M32.14 GLOMERULAR DISEASE IN SYSTEMIC LUPUS ERYTHEMATOSUS: ICD-10-CM

## 2024-07-16 DIAGNOSIS — L30.9 DERMATITIS, UNSPECIFIED: ICD-10-CM

## 2024-07-16 DIAGNOSIS — Z79.01 LONG TERM (CURRENT) USE OF ANTICOAGULANTS: ICD-10-CM

## 2024-07-16 DIAGNOSIS — M87.9 OSTEONECROSIS, UNSPECIFIED: ICD-10-CM

## 2024-07-16 DIAGNOSIS — Z79.899 OTHER LONG TERM (CURRENT) DRUG THERAPY: ICD-10-CM

## 2024-07-16 DIAGNOSIS — Z79.60 LONG TERM (CURRENT) USE OF UNSPECIFIED IMMUNOMODULATORS AND IMMUNOSUPPRESSANTS: ICD-10-CM

## 2024-07-16 DIAGNOSIS — M32.9 SYSTEMIC LUPUS ERYTHEMATOSUS, UNSPECIFIED: ICD-10-CM

## 2024-07-16 PROCEDURE — G2211 COMPLEX E/M VISIT ADD ON: CPT

## 2024-07-16 PROCEDURE — 99214 OFFICE O/P EST MOD 30 MIN: CPT

## 2024-07-19 ENCOUNTER — APPOINTMENT (OUTPATIENT)
Dept: UROLOGY | Facility: CLINIC | Age: 34
End: 2024-07-19

## 2024-08-07 ENCOUNTER — LABORATORY RESULT (OUTPATIENT)
Age: 34
End: 2024-08-07

## 2024-08-07 ENCOUNTER — APPOINTMENT (OUTPATIENT)
Dept: RHEUMATOLOGY | Facility: CLINIC | Age: 34
End: 2024-08-07

## 2024-08-07 PROCEDURE — 96413 CHEMO IV INFUSION 1 HR: CPT

## 2024-08-07 NOTE — HISTORY OF PRESENT ILLNESS
[N/A] : N/A [Denies] : Denies [No] : No [Yes] : Yes [Informed consent documented in EHR.] : Informed consent documented in EHR. [22g] : 22g [Start Time: ___] : Medication Start Time: [unfilled] [End Time: ___] : Medication End Time: [unfilled] [Medication Name: ___] : Medication Name: [unfilled] [Total Amount Administered: ___] : Total Amount Administered: [unfilled] [IV discontinued. Intact. No signs or symptoms of IV complications noted. Time: ___] : IV discontinued. Intact. No signs or symptoms of IV complications noted. Time: [unfilled] [Patient  instructed to seek medical attention with signs and symptoms of adverse effects] : Patient  instructed to seek medical attention with signs and symptoms of adverse effects [Patient left unit in no acute distress] : Patient left unit in no acute distress [Medications administered as ordered and tolerated well.] : Medications administered as ordered and tolerated well. [Blood drawn at time of visit] : Blood drawn at time of visit [de-identified] : rash to face occasionally to arm [de-identified] : right arm cephalic vein [de-identified] : labs drawn as prescribed [de-identified] : Patient presents for scheduled Benlysta infusion, ambulatory in [facility]. Patient continues to reports mild rash to face and moderate daily fatigue, Denies pain and no swelling. Patient admits taking Eliquis / denies any s/s of bleeding. Patient denies of having any other complaints or symptoms today. Medication administered as prescribed, and infusion tolerated well.

## 2024-08-08 LAB
ALBUMIN SERPL ELPH-MCNC: 4.1 G/DL
ALP BLD-CCNC: 78 U/L
ALT SERPL-CCNC: 11 U/L
ANION GAP SERPL CALC-SCNC: 20 MMOL/L
APPEARANCE: CLEAR
AST SERPL-CCNC: 25 U/L
BACTERIA: NEGATIVE /HPF
BASOPHILS # BLD AUTO: 0.02 K/UL
BASOPHILS NFR BLD AUTO: 0.4 %
BILIRUB SERPL-MCNC: 0.4 MG/DL
BILIRUBIN URINE: NEGATIVE
BLOOD URINE: NEGATIVE
BUN SERPL-MCNC: 10 MG/DL
C3 SERPL-MCNC: 89 MG/DL
C4 SERPL-MCNC: 19 MG/DL
CALCIUM SERPL-MCNC: 9.3 MG/DL
CAST: 2 /LPF
CHLORIDE SERPL-SCNC: 103 MMOL/L
CK SERPL-CCNC: 435 U/L
CO2 SERPL-SCNC: 21 MMOL/L
COLOR: YELLOW
CREAT SERPL-MCNC: 1.09 MG/DL
CREAT SPEC-SCNC: 196 MG/DL
CREAT/PROT UR: 0.1 RATIO
DSDNA AB SER-ACNC: 60 IU/ML
EGFR: 92 ML/MIN/1.73M2
EOSINOPHIL # BLD AUTO: 0.04 K/UL
EOSINOPHIL NFR BLD AUTO: 0.8 %
EPITHELIAL CELLS: 2 /HPF
GLUCOSE QUALITATIVE U: NEGATIVE MG/DL
HCT VFR BLD CALC: 39.4 %
HGB BLD-MCNC: 12 G/DL
IMM GRANULOCYTES NFR BLD AUTO: 0.2 %
KETONES URINE: NEGATIVE MG/DL
LEUKOCYTE ESTERASE URINE: NEGATIVE
LYMPHOCYTES # BLD AUTO: 1.13 K/UL
LYMPHOCYTES NFR BLD AUTO: 22.1 %
MAN DIFF?: NORMAL
MCHC RBC-ENTMCNC: 28.6 PG
MCHC RBC-ENTMCNC: 30.5 GM/DL
MCV RBC AUTO: 93.8 FL
MICROSCOPIC-UA: NORMAL
MONOCYTES # BLD AUTO: 0.52 K/UL
MONOCYTES NFR BLD AUTO: 10.2 %
NEUTROPHILS # BLD AUTO: 3.39 K/UL
NEUTROPHILS NFR BLD AUTO: 66.3 %
NITRITE URINE: NEGATIVE
PH URINE: 6
PLATELET # BLD AUTO: 144 K/UL
POTASSIUM SERPL-SCNC: 4.3 MMOL/L
PROT SERPL-MCNC: 6.4 G/DL
PROT UR-MCNC: 9 MG/DL
PROTEIN URINE: NORMAL MG/DL
RBC # BLD: 4.2 M/UL
RBC # FLD: 14.4 %
RED BLOOD CELLS URINE: 1 /HPF
SODIUM SERPL-SCNC: 144 MMOL/L
SPECIFIC GRAVITY URINE: 1.02
UROBILINOGEN URINE: 0.2 MG/DL
WBC # FLD AUTO: 5.11 K/UL
WHITE BLOOD CELLS URINE: 1 /HPF

## 2024-08-12 ENCOUNTER — RX RENEWAL (OUTPATIENT)
Age: 34
End: 2024-08-12

## 2024-08-13 ENCOUNTER — APPOINTMENT (OUTPATIENT)
Dept: UROLOGY | Facility: CLINIC | Age: 34
End: 2024-08-13
Payer: COMMERCIAL

## 2024-08-13 DIAGNOSIS — N52.9 MALE ERECTILE DYSFUNCTION, UNSPECIFIED: ICD-10-CM

## 2024-08-13 PROCEDURE — 99213 OFFICE O/P EST LOW 20 MIN: CPT

## 2024-08-13 PROCEDURE — G2211 COMPLEX E/M VISIT ADD ON: CPT

## 2024-08-13 NOTE — HISTORY OF PRESENT ILLNESS
[Home] : at home, [unfilled] , at the time of the visit. [Medical Office: ([facility])___] : at the medical office located in  [FreeTextEntry1] : The patient-doctor relationship has been established in a face to face fashion via real time video/audio HIPAA compliant communication using telemedicine software.  He has requested care to be assessed and treated through telemedicine. He understands that there may be limitations in this process and that he may need further follow up care in the office and/or hospital setting.  Very pleasant 34-year-old gentleman who presents for follow-up of erectile dysfunction.  He feels well.  He continues to report that Cialis 20 mg significantly improves his erections.  He reports that he does not always need to use the medication to have sexual intercourse.  He is happy about this.  He continues to report that his anxiety and depression have significantly improved.  He also reports that this improves his erections.  He reports that this is the best summer that he has had in a long time.

## 2024-08-13 NOTE — ASSESSMENT
[FreeTextEntry1] : Very pleasant 34-year-old gentleman who presents for follow-up of erectile dysfunction -Continue Cialis 20 mg as needed for erectile dysfunction-refill sent to the pharmacy -Follow-up in 6 months or sooner if necessary  Patient is being seen today for evaluation and management of a chronic and longitudinal ongoing condition and I am of the primary treating physician

## 2024-09-21 ENCOUNTER — LABORATORY RESULT (OUTPATIENT)
Age: 34
End: 2024-09-21

## 2024-09-21 ENCOUNTER — APPOINTMENT (OUTPATIENT)
Dept: RHEUMATOLOGY | Facility: CLINIC | Age: 34
End: 2024-09-21
Payer: COMMERCIAL

## 2024-09-21 VITALS — DIASTOLIC BLOOD PRESSURE: 68 MMHG | OXYGEN SATURATION: 99 % | SYSTOLIC BLOOD PRESSURE: 103 MMHG | HEART RATE: 63 BPM

## 2024-09-21 VITALS
TEMPERATURE: 97.7 F | OXYGEN SATURATION: 99 % | DIASTOLIC BLOOD PRESSURE: 69 MMHG | RESPIRATION RATE: 16 BRPM | SYSTOLIC BLOOD PRESSURE: 108 MMHG | HEART RATE: 71 BPM

## 2024-09-21 PROCEDURE — 96413 CHEMO IV INFUSION 1 HR: CPT

## 2024-09-21 RX ORDER — BELIMUMAB 400 MG/5ML
400 INJECTION, POWDER, LYOPHILIZED, FOR SOLUTION INTRAVENOUS
Qty: 1 | Refills: 0 | Status: COMPLETED
Start: 2024-07-03

## 2024-09-21 NOTE — HISTORY OF PRESENT ILLNESS
[N/A] : N/A [Denies] : Denies [No] : No [Yes] : Yes [Informed consent documented in EHR.] : Informed consent documented in EHR. [22g] : 22g [Start Time: ___] : Medication Start Time: [unfilled] [End Time: ___] : Medication End Time: [unfilled] [Medication Name: ___] : Medication Name: [unfilled] [Total Amount Administered: ___] : Total Amount Administered: [unfilled] [IV discontinued. Intact. No signs or symptoms of IV complications noted. Time: ___] : IV discontinued. Intact. No signs or symptoms of IV complications noted. Time: [unfilled] [Patient  instructed to seek medical attention with signs and symptoms of adverse effects] : Patient  instructed to seek medical attention with signs and symptoms of adverse effects [Patient left unit in no acute distress] : Patient left unit in no acute distress [Medications administered as ordered and tolerated well.] : Medications administered as ordered and tolerated well. [Blood drawn at time of visit] : Blood drawn at time of visit [de-identified] : LEFT FOREARM  [de-identified] : labs drawn as prescribed [de-identified] : Patient presents for scheduled Benlysta infusion, ambulatory in [facility]. Patient reports having on and off mild rash to face and denies of having any today. Denies pain, swelling or stiffness. Patient admits taking Eliquis / denies any s/s of bleeding. Patient denies of having any other complaints or symptoms today. Medication administered as prescribed, and infusion tolerated well.

## 2024-09-22 LAB
ALBUMIN SERPL ELPH-MCNC: 4.2 G/DL
ALP BLD-CCNC: 84 U/L
ALT SERPL-CCNC: 14 U/L
ANION GAP SERPL CALC-SCNC: 18 MMOL/L
APPEARANCE: CLEAR
AST SERPL-CCNC: 24 U/L
BACTERIA: NEGATIVE /HPF
BASOPHILS # BLD AUTO: 0.02 K/UL
BASOPHILS NFR BLD AUTO: 0.3 %
BILIRUB SERPL-MCNC: 0.3 MG/DL
BILIRUBIN URINE: NEGATIVE
BLOOD URINE: NEGATIVE
BUN SERPL-MCNC: 16 MG/DL
C3 SERPL-MCNC: 110 MG/DL
C4 SERPL-MCNC: 23 MG/DL
CALCIUM SERPL-MCNC: 9.6 MG/DL
CAST: 0 /LPF
CHLORIDE SERPL-SCNC: 103 MMOL/L
CO2 SERPL-SCNC: 21 MMOL/L
COLOR: YELLOW
CREAT SERPL-MCNC: 1.08 MG/DL
CREAT SPEC-SCNC: 198 MG/DL
CREAT/PROT UR: 0.1 RATIO
EGFR: 93 ML/MIN/1.73M2
EOSINOPHIL # BLD AUTO: 0.08 K/UL
EOSINOPHIL NFR BLD AUTO: 1.3 %
EPITHELIAL CELLS: 0 /HPF
GLUCOSE QUALITATIVE U: NEGATIVE MG/DL
HCT VFR BLD CALC: 41.1 %
HGB BLD-MCNC: 12.9 G/DL
IMM GRANULOCYTES NFR BLD AUTO: 0.3 %
KETONES URINE: NEGATIVE MG/DL
LEUKOCYTE ESTERASE URINE: NEGATIVE
LYMPHOCYTES # BLD AUTO: 1.09 K/UL
LYMPHOCYTES NFR BLD AUTO: 18.2 %
MAN DIFF?: NORMAL
MCHC RBC-ENTMCNC: 28.5 PG
MCHC RBC-ENTMCNC: 31.4 GM/DL
MCV RBC AUTO: 90.7 FL
MICROSCOPIC-UA: NORMAL
MONOCYTES # BLD AUTO: 0.65 K/UL
MONOCYTES NFR BLD AUTO: 10.9 %
NEUTROPHILS # BLD AUTO: 4.12 K/UL
NEUTROPHILS NFR BLD AUTO: 69 %
NITRITE URINE: NEGATIVE
PH URINE: 5.5
PLATELET # BLD AUTO: 141 K/UL
POTASSIUM SERPL-SCNC: 4 MMOL/L
PROT SERPL-MCNC: 6.6 G/DL
PROT UR-MCNC: 12 MG/DL
PROTEIN URINE: NEGATIVE MG/DL
RBC # BLD: 4.53 M/UL
RBC # FLD: 13.5 %
RED BLOOD CELLS URINE: 0 /HPF
SODIUM SERPL-SCNC: 142 MMOL/L
SPECIFIC GRAVITY URINE: 1.02
UROBILINOGEN URINE: 0.2 MG/DL
WBC # FLD AUTO: 5.98 K/UL
WHITE BLOOD CELLS URINE: 0 /HPF

## 2024-09-23 LAB — CK SERPL-CCNC: 364 U/L

## 2024-09-24 LAB — DSDNA AB SER-ACNC: 55 IU/ML

## 2024-10-02 PROCEDURE — 99214 OFFICE O/P EST MOD 30 MIN: CPT | Mod: 95

## 2024-10-19 ENCOUNTER — APPOINTMENT (OUTPATIENT)
Dept: RHEUMATOLOGY | Facility: CLINIC | Age: 34
End: 2024-10-19
Payer: COMMERCIAL

## 2024-10-19 VITALS
TEMPERATURE: 98 F | SYSTOLIC BLOOD PRESSURE: 105 MMHG | OXYGEN SATURATION: 98 % | HEART RATE: 75 BPM | RESPIRATION RATE: 16 BRPM | DIASTOLIC BLOOD PRESSURE: 68 MMHG

## 2024-10-19 VITALS
SYSTOLIC BLOOD PRESSURE: 102 MMHG | DIASTOLIC BLOOD PRESSURE: 65 MMHG | RESPIRATION RATE: 16 BRPM | HEART RATE: 61 BPM | OXYGEN SATURATION: 98 %

## 2024-10-19 PROCEDURE — 96413 CHEMO IV INFUSION 1 HR: CPT

## 2024-10-19 RX ORDER — BELIMUMAB 400 MG/5ML
400 INJECTION, POWDER, LYOPHILIZED, FOR SOLUTION INTRAVENOUS
Qty: 1 | Refills: 0 | Status: COMPLETED
Start: 2024-07-03

## 2024-10-20 LAB
ALBUMIN SERPL ELPH-MCNC: 4.4 G/DL
ALP BLD-CCNC: 90 U/L
ALT SERPL-CCNC: 15 U/L
ANION GAP SERPL CALC-SCNC: 19 MMOL/L
APPEARANCE: CLEAR
AST SERPL-CCNC: 31 U/L
BACTERIA: NEGATIVE /HPF
BASOPHILS # BLD AUTO: 0.02 K/UL
BASOPHILS NFR BLD AUTO: 0.3 %
BILIRUB SERPL-MCNC: 0.6 MG/DL
BILIRUBIN URINE: ABNORMAL
BLOOD URINE: NEGATIVE
BUN SERPL-MCNC: 15 MG/DL
CALCIUM OXALATE CRYSTALS: PRESENT
CALCIUM SERPL-MCNC: 9.8 MG/DL
CAST: 5 /LPF
CHLORIDE SERPL-SCNC: 101 MMOL/L
CK SERPL-CCNC: 700 U/L
CO2 SERPL-SCNC: 23 MMOL/L
COLOR: NORMAL
CREAT SERPL-MCNC: 1.25 MG/DL
CREAT SPEC-SCNC: 676 MG/DL
CREAT/PROT UR: 0.1 RATIO
EGFR: 78 ML/MIN/1.73M2
EOSINOPHIL # BLD AUTO: 0.07 K/UL
EOSINOPHIL NFR BLD AUTO: 1.2 %
EPITHELIAL CELLS: 1 /HPF
GLUCOSE QUALITATIVE U: NEGATIVE MG/DL
HCT VFR BLD CALC: 41.7 %
HGB BLD-MCNC: 13 G/DL
HYALINE CASTS: PRESENT
IMM GRANULOCYTES NFR BLD AUTO: 0.2 %
KETONES URINE: ABNORMAL MG/DL
LEUKOCYTE ESTERASE URINE: NEGATIVE
LYMPHOCYTES # BLD AUTO: 1.13 K/UL
LYMPHOCYTES NFR BLD AUTO: 19.6 %
MAN DIFF?: NORMAL
MCHC RBC-ENTMCNC: 27.8 PG
MCHC RBC-ENTMCNC: 31.2 GM/DL
MCV RBC AUTO: 89.1 FL
MICROSCOPIC-UA: NORMAL
MONOCYTES # BLD AUTO: 0.7 K/UL
MONOCYTES NFR BLD AUTO: 12.1 %
MUCUS: PRESENT
NEUTROPHILS # BLD AUTO: 3.85 K/UL
NEUTROPHILS NFR BLD AUTO: 66.6 %
NITRITE URINE: NEGATIVE
PH URINE: 5
PLATELET # BLD AUTO: 186 K/UL
POTASSIUM SERPL-SCNC: 4.1 MMOL/L
PROT SERPL-MCNC: 7.2 G/DL
PROT UR-MCNC: 65 MG/DL
PROTEIN URINE: 30 MG/DL
RBC # BLD: 4.68 M/UL
RBC # FLD: 13.8 %
RED BLOOD CELLS URINE: 4 /HPF
REVIEW: NORMAL
SODIUM SERPL-SCNC: 144 MMOL/L
SPECIFIC GRAVITY URINE: >1.03
UROBILINOGEN URINE: 1 MG/DL
WBC # FLD AUTO: 5.78 K/UL
WHITE BLOOD CELLS URINE: 1 /HPF

## 2024-10-21 ENCOUNTER — APPOINTMENT (OUTPATIENT)
Dept: RHEUMATOLOGY | Facility: CLINIC | Age: 34
End: 2024-10-21

## 2024-10-21 LAB
C3 SERPL-MCNC: 116 MG/DL
C4 SERPL-MCNC: 24 MG/DL
DSDNA AB SER-ACNC: 55 IU/ML

## 2024-11-16 ENCOUNTER — APPOINTMENT (OUTPATIENT)
Dept: RHEUMATOLOGY | Facility: CLINIC | Age: 34
End: 2024-11-16
Payer: COMMERCIAL

## 2024-11-16 ENCOUNTER — LABORATORY RESULT (OUTPATIENT)
Age: 34
End: 2024-11-16

## 2024-11-16 VITALS
HEART RATE: 69 BPM | OXYGEN SATURATION: 98 % | RESPIRATION RATE: 16 BRPM | TEMPERATURE: 97.9 F | SYSTOLIC BLOOD PRESSURE: 99 MMHG | DIASTOLIC BLOOD PRESSURE: 62 MMHG

## 2024-11-16 VITALS — SYSTOLIC BLOOD PRESSURE: 105 MMHG | HEART RATE: 62 BPM | DIASTOLIC BLOOD PRESSURE: 66 MMHG

## 2024-11-16 PROCEDURE — 96413 CHEMO IV INFUSION 1 HR: CPT

## 2024-11-16 RX ORDER — BELIMUMAB 400 MG/5ML
400 INJECTION, POWDER, LYOPHILIZED, FOR SOLUTION INTRAVENOUS
Qty: 1 | Refills: 0 | Status: COMPLETED
Start: 2024-07-03

## 2024-11-18 LAB
ALBUMIN SERPL ELPH-MCNC: 3.9 G/DL
ALP BLD-CCNC: 77 U/L
ALT SERPL-CCNC: 9 U/L
ANION GAP SERPL CALC-SCNC: 14 MMOL/L
APPEARANCE: CLEAR
AST SERPL-CCNC: 23 U/L
BACTERIA: NEGATIVE /HPF
BASOPHILS # BLD AUTO: 0.05 K/UL
BASOPHILS NFR BLD AUTO: 0.9 %
BILIRUB SERPL-MCNC: 0.4 MG/DL
BILIRUBIN URINE: NEGATIVE
BLOOD URINE: NEGATIVE
BUN SERPL-MCNC: 17 MG/DL
CALCIUM SERPL-MCNC: 9.2 MG/DL
CAST: 1 /LPF
CHLORIDE SERPL-SCNC: 102 MMOL/L
CK SERPL-CCNC: 288 U/L
CO2 SERPL-SCNC: 24 MMOL/L
COLOR: YELLOW
CREAT SERPL-MCNC: 1.06 MG/DL
CREAT SPEC-SCNC: 190 MG/DL
CREAT/PROT UR: 0.1 RATIO
EGFR: 95 ML/MIN/1.73M2
EOSINOPHIL # BLD AUTO: 0.1 K/UL
EOSINOPHIL NFR BLD AUTO: 1.7 %
EPITHELIAL CELLS: 0 /HPF
GLUCOSE QUALITATIVE U: NEGATIVE MG/DL
HCT VFR BLD CALC: 38.8 %
HGB BLD-MCNC: 12.2 G/DL
KETONES URINE: NEGATIVE MG/DL
LEUKOCYTE ESTERASE URINE: NEGATIVE
LYMPHOCYTES # BLD AUTO: 1.2 K/UL
LYMPHOCYTES NFR BLD AUTO: 20.3 %
MAN DIFF?: NORMAL
MCHC RBC-ENTMCNC: 28.4 PG
MCHC RBC-ENTMCNC: 31.4 G/DL
MCV RBC AUTO: 90.4 FL
MICROSCOPIC-UA: NORMAL
MONOCYTES # BLD AUTO: 0.55 K/UL
MONOCYTES NFR BLD AUTO: 9.3 %
NEUTROPHILS # BLD AUTO: 4.02 K/UL
NEUTROPHILS NFR BLD AUTO: 67.8 %
NITRITE URINE: NEGATIVE
PH URINE: 5.5
PLATELET # BLD AUTO: 166 K/UL
POTASSIUM SERPL-SCNC: 4.5 MMOL/L
PROT SERPL-MCNC: 6.4 G/DL
PROT UR-MCNC: 27 MG/DL
PROTEIN URINE: 30 MG/DL
RBC # BLD: 4.29 M/UL
RBC # FLD: 14 %
RED BLOOD CELLS URINE: 1 /HPF
SODIUM SERPL-SCNC: 140 MMOL/L
SPECIFIC GRAVITY URINE: 1.02
UROBILINOGEN URINE: 0.2 MG/DL
WBC # FLD AUTO: 5.93 K/UL
WHITE BLOOD CELLS URINE: 1 /HPF

## 2024-11-19 LAB
C3 SERPL-MCNC: 111 MG/DL
C4 SERPL-MCNC: 22 MG/DL

## 2024-11-20 LAB — DSDNA AB SER-ACNC: 58 IU/ML

## 2024-12-09 ENCOUNTER — APPOINTMENT (OUTPATIENT)
Dept: HEART AND VASCULAR | Facility: CLINIC | Age: 34
End: 2024-12-09
Payer: COMMERCIAL

## 2024-12-09 ENCOUNTER — APPOINTMENT (OUTPATIENT)
Dept: HEART FAILURE | Facility: CLINIC | Age: 34
End: 2024-12-09
Payer: COMMERCIAL

## 2024-12-09 ENCOUNTER — NON-APPOINTMENT (OUTPATIENT)
Age: 34
End: 2024-12-09

## 2024-12-09 VITALS
WEIGHT: 164 LBS | BODY MASS INDEX: 24.86 KG/M2 | TEMPERATURE: 97.8 F | HEIGHT: 68 IN | OXYGEN SATURATION: 99 % | HEART RATE: 71 BPM | DIASTOLIC BLOOD PRESSURE: 78 MMHG | SYSTOLIC BLOOD PRESSURE: 120 MMHG

## 2024-12-09 DIAGNOSIS — M32.9 SYSTEMIC LUPUS ERYTHEMATOSUS, UNSPECIFIED: ICD-10-CM

## 2024-12-09 DIAGNOSIS — I42.8 OTHER CARDIOMYOPATHIES: ICD-10-CM

## 2024-12-09 PROCEDURE — G0405: CPT

## 2024-12-09 PROCEDURE — G2211 COMPLEX E/M VISIT ADD ON: CPT

## 2024-12-09 PROCEDURE — 99213 OFFICE O/P EST LOW 20 MIN: CPT

## 2024-12-14 ENCOUNTER — APPOINTMENT (OUTPATIENT)
Dept: RHEUMATOLOGY | Facility: CLINIC | Age: 34
End: 2024-12-14
Payer: COMMERCIAL

## 2024-12-14 VITALS — DIASTOLIC BLOOD PRESSURE: 70 MMHG | HEART RATE: 74 BPM | SYSTOLIC BLOOD PRESSURE: 110 MMHG

## 2024-12-14 VITALS
RESPIRATION RATE: 16 BRPM | OXYGEN SATURATION: 98 % | SYSTOLIC BLOOD PRESSURE: 95 MMHG | HEART RATE: 80 BPM | TEMPERATURE: 98.2 F | DIASTOLIC BLOOD PRESSURE: 59 MMHG

## 2024-12-14 PROCEDURE — 96413 CHEMO IV INFUSION 1 HR: CPT

## 2024-12-14 RX ORDER — BELIMUMAB 400 MG/5ML
400 INJECTION, POWDER, LYOPHILIZED, FOR SOLUTION INTRAVENOUS
Qty: 1 | Refills: 0 | Status: COMPLETED
Start: 2024-07-03

## 2024-12-15 LAB
ALBUMIN SERPL ELPH-MCNC: 4.2 G/DL
ALP BLD-CCNC: 87 U/L
ALT SERPL-CCNC: 11 U/L
ANION GAP SERPL CALC-SCNC: 12 MMOL/L
APPEARANCE: CLEAR
AST SERPL-CCNC: 22 U/L
BACTERIA: NEGATIVE /HPF
BASOPHILS # BLD AUTO: 0.03 K/UL
BASOPHILS NFR BLD AUTO: 0.5 %
BILIRUB SERPL-MCNC: 0.5 MG/DL
BILIRUBIN URINE: NEGATIVE
BLOOD URINE: NEGATIVE
BUN SERPL-MCNC: 14 MG/DL
CALCIUM SERPL-MCNC: 9.5 MG/DL
CAST: 0 /LPF
CHLORIDE SERPL-SCNC: 101 MMOL/L
CK SERPL-CCNC: 329 U/L
CO2 SERPL-SCNC: 27 MMOL/L
COLOR: YELLOW
CREAT SERPL-MCNC: 1.19 MG/DL
CREAT SPEC-SCNC: 136 MG/DL
CREAT/PROT UR: 0.1 RATIO
EGFR: 83 ML/MIN/1.73M2
EOSINOPHIL # BLD AUTO: 0.07 K/UL
EOSINOPHIL NFR BLD AUTO: 1.2 %
EPITHELIAL CELLS: 0 /HPF
GLUCOSE QUALITATIVE U: NEGATIVE MG/DL
HCT VFR BLD CALC: 42.3 %
HGB BLD-MCNC: 12.9 G/DL
IMM GRANULOCYTES NFR BLD AUTO: 0.2 %
KETONES URINE: NEGATIVE MG/DL
LEUKOCYTE ESTERASE URINE: NEGATIVE
LYMPHOCYTES # BLD AUTO: 1.3 K/UL
LYMPHOCYTES NFR BLD AUTO: 21.7 %
MAN DIFF?: NORMAL
MCHC RBC-ENTMCNC: 28.5 PG
MCHC RBC-ENTMCNC: 30.5 G/DL
MCV RBC AUTO: 93.6 FL
MICROSCOPIC-UA: NORMAL
MONOCYTES # BLD AUTO: 0.66 K/UL
MONOCYTES NFR BLD AUTO: 11 %
NEUTROPHILS # BLD AUTO: 3.91 K/UL
NEUTROPHILS NFR BLD AUTO: 65.4 %
NITRITE URINE: NEGATIVE
PH URINE: 6.5
PLATELET # BLD AUTO: 186 K/UL
POTASSIUM SERPL-SCNC: 4.6 MMOL/L
PROT SERPL-MCNC: 6.6 G/DL
PROT UR-MCNC: 7 MG/DL
PROTEIN URINE: NEGATIVE MG/DL
RBC # BLD: 4.52 M/UL
RBC # FLD: 13.5 %
RED BLOOD CELLS URINE: 0 /HPF
SODIUM SERPL-SCNC: 140 MMOL/L
SPECIFIC GRAVITY URINE: 1.02
UROBILINOGEN URINE: 0.2 MG/DL
WBC # FLD AUTO: 5.98 K/UL
WHITE BLOOD CELLS URINE: 3 /HPF

## 2024-12-16 LAB
C3 SERPL-MCNC: 114 MG/DL
C4 SERPL-MCNC: 26 MG/DL

## 2024-12-17 LAB — DSDNA AB SER-ACNC: 54 IU/ML

## 2024-12-18 ENCOUNTER — RX RENEWAL (OUTPATIENT)
Age: 34
End: 2024-12-18

## 2025-01-10 ENCOUNTER — APPOINTMENT (OUTPATIENT)
Dept: CV DIAGNOSITCS | Facility: HOSPITAL | Age: 35
End: 2025-01-10

## 2025-01-10 ENCOUNTER — OUTPATIENT (OUTPATIENT)
Dept: OUTPATIENT SERVICES | Facility: HOSPITAL | Age: 35
LOS: 1 days | End: 2025-01-10
Payer: COMMERCIAL

## 2025-01-10 ENCOUNTER — RESULT REVIEW (OUTPATIENT)
Age: 35
End: 2025-01-10

## 2025-01-10 DIAGNOSIS — I47.20 VENTRICULAR TACHYCARDIA, UNSPECIFIED: ICD-10-CM

## 2025-01-10 PROCEDURE — 93356 MYOCRD STRAIN IMG SPCKL TRCK: CPT

## 2025-01-10 PROCEDURE — 93306 TTE W/DOPPLER COMPLETE: CPT

## 2025-01-10 PROCEDURE — 76376 3D RENDER W/INTRP POSTPROCES: CPT

## 2025-01-10 PROCEDURE — 76376 3D RENDER W/INTRP POSTPROCES: CPT | Mod: 26

## 2025-01-10 PROCEDURE — 93306 TTE W/DOPPLER COMPLETE: CPT | Mod: 26

## 2025-01-15 RX ORDER — BELIMUMAB 400 MG/5ML
400 INJECTION, POWDER, LYOPHILIZED, FOR SOLUTION INTRAVENOUS
Refills: 0 | Status: ACTIVE | OUTPATIENT
Start: 2025-01-15 | End: 1900-01-01

## 2025-01-18 ENCOUNTER — APPOINTMENT (OUTPATIENT)
Dept: RHEUMATOLOGY | Facility: CLINIC | Age: 35
End: 2025-01-18

## 2025-01-18 VITALS
TEMPERATURE: 98.3 F | DIASTOLIC BLOOD PRESSURE: 65 MMHG | SYSTOLIC BLOOD PRESSURE: 104 MMHG | OXYGEN SATURATION: 99 % | RESPIRATION RATE: 16 BRPM | HEART RATE: 75 BPM

## 2025-01-18 VITALS
DIASTOLIC BLOOD PRESSURE: 66 MMHG | HEART RATE: 88 BPM | OXYGEN SATURATION: 95 % | RESPIRATION RATE: 16 BRPM | SYSTOLIC BLOOD PRESSURE: 109 MMHG

## 2025-01-18 LAB
ALBUMIN SERPL ELPH-MCNC: 4.2 G/DL
ALP BLD-CCNC: 88 U/L
ALT SERPL-CCNC: 12 U/L
ANION GAP SERPL CALC-SCNC: 12 MMOL/L
AST SERPL-CCNC: 23 U/L
BASOPHILS # BLD AUTO: 0.02 K/UL
BASOPHILS NFR BLD AUTO: 0.3 %
BILIRUB SERPL-MCNC: 0.4 MG/DL
BUN SERPL-MCNC: 10 MG/DL
CALCIUM SERPL-MCNC: 9.4 MG/DL
CHLORIDE SERPL-SCNC: 102 MMOL/L
CK SERPL-CCNC: 429 U/L
CO2 SERPL-SCNC: 27 MMOL/L
CREAT SERPL-MCNC: 1.13 MG/DL
CREAT SPEC-SCNC: 125 MG/DL
CREAT/PROT UR: 0.1 RATIO
EGFR: 88 ML/MIN/1.73M2
EOSINOPHIL # BLD AUTO: 0.06 K/UL
EOSINOPHIL NFR BLD AUTO: 1 %
HCT VFR BLD CALC: 42.8 %
HGB BLD-MCNC: 13.2 G/DL
IMM GRANULOCYTES NFR BLD AUTO: 0.2 %
LYMPHOCYTES # BLD AUTO: 1.17 K/UL
LYMPHOCYTES NFR BLD AUTO: 20.4 %
MAN DIFF?: NORMAL
MCHC RBC-ENTMCNC: 28.6 PG
MCHC RBC-ENTMCNC: 30.8 G/DL
MCV RBC AUTO: 92.6 FL
MONOCYTES # BLD AUTO: 0.46 K/UL
MONOCYTES NFR BLD AUTO: 8 %
NEUTROPHILS # BLD AUTO: 4.02 K/UL
NEUTROPHILS NFR BLD AUTO: 70.1 %
PLATELET # BLD AUTO: 183 K/UL
POTASSIUM SERPL-SCNC: 4.1 MMOL/L
PROT SERPL-MCNC: 6.5 G/DL
PROT UR-MCNC: 6 MG/DL
RBC # BLD: 4.62 M/UL
RBC # FLD: 13.7 %
SODIUM SERPL-SCNC: 142 MMOL/L
WBC # FLD AUTO: 5.74 K/UL

## 2025-01-18 PROCEDURE — 96413 CHEMO IV INFUSION 1 HR: CPT

## 2025-01-18 RX ORDER — BELIMUMAB 400 MG/5ML
400 INJECTION, POWDER, LYOPHILIZED, FOR SOLUTION INTRAVENOUS
Qty: 1 | Refills: 0 | Status: COMPLETED
Start: 2025-01-15

## 2025-01-19 LAB
APPEARANCE: CLEAR
BACTERIA: NEGATIVE /HPF
BILIRUBIN URINE: NEGATIVE
BLOOD URINE: NEGATIVE
CAST: 0 /LPF
COLOR: YELLOW
DSDNA AB SER-ACNC: 51 IU/ML
EPITHELIAL CELLS: 0 /HPF
GLUCOSE QUALITATIVE U: NEGATIVE MG/DL
KETONES URINE: NEGATIVE MG/DL
LEUKOCYTE ESTERASE URINE: NEGATIVE
MICROSCOPIC-UA: NORMAL
NITRITE URINE: NEGATIVE
PH URINE: 5.5
PROTEIN URINE: NEGATIVE MG/DL
RED BLOOD CELLS URINE: 0 /HPF
SPECIFIC GRAVITY URINE: 1.01
UROBILINOGEN URINE: 0.2 MG/DL
WHITE BLOOD CELLS URINE: 0 /HPF

## 2025-01-20 LAB
C3 SERPL-MCNC: 112 MG/DL
C4 SERPL-MCNC: 26 MG/DL

## 2025-01-21 ENCOUNTER — APPOINTMENT (OUTPATIENT)
Dept: RHEUMATOLOGY | Facility: CLINIC | Age: 35
End: 2025-01-21

## 2025-01-21 VITALS
HEIGHT: 60 IN | WEIGHT: 166.5 LBS | OXYGEN SATURATION: 98 % | RESPIRATION RATE: 16 BRPM | HEART RATE: 82 BPM | BODY MASS INDEX: 32.69 KG/M2 | SYSTOLIC BLOOD PRESSURE: 122 MMHG | DIASTOLIC BLOOD PRESSURE: 80 MMHG

## 2025-01-21 DIAGNOSIS — M87.9 OSTEONECROSIS, UNSPECIFIED: ICD-10-CM

## 2025-01-21 DIAGNOSIS — M32.14 GLOMERULAR DISEASE IN SYSTEMIC LUPUS ERYTHEMATOSUS: ICD-10-CM

## 2025-01-21 DIAGNOSIS — M32.9 SYSTEMIC LUPUS ERYTHEMATOSUS, UNSPECIFIED: ICD-10-CM

## 2025-01-21 DIAGNOSIS — Z79.01 LONG TERM (CURRENT) USE OF ANTICOAGULANTS: ICD-10-CM

## 2025-01-21 DIAGNOSIS — Z79.60 LONG TERM (CURRENT) USE OF UNSPECIFIED IMMUNOMODULATORS AND IMMUNOSUPPRESSANTS: ICD-10-CM

## 2025-01-21 DIAGNOSIS — Z79.899 OTHER LONG TERM (CURRENT) DRUG THERAPY: ICD-10-CM

## 2025-01-21 PROCEDURE — G2211 COMPLEX E/M VISIT ADD ON: CPT

## 2025-01-21 PROCEDURE — 99214 OFFICE O/P EST MOD 30 MIN: CPT | Mod: 25

## 2025-01-21 PROCEDURE — G0008: CPT

## 2025-01-21 PROCEDURE — 90656 IIV3 VACC NO PRSV 0.5 ML IM: CPT

## 2025-02-15 ENCOUNTER — APPOINTMENT (OUTPATIENT)
Dept: RHEUMATOLOGY | Facility: CLINIC | Age: 35
End: 2025-02-15

## 2025-02-25 ENCOUNTER — LABORATORY RESULT (OUTPATIENT)
Age: 35
End: 2025-02-25

## 2025-02-25 ENCOUNTER — APPOINTMENT (OUTPATIENT)
Dept: RHEUMATOLOGY | Facility: CLINIC | Age: 35
End: 2025-02-25
Payer: COMMERCIAL

## 2025-02-25 VITALS
TEMPERATURE: 97.9 F | SYSTOLIC BLOOD PRESSURE: 100 MMHG | OXYGEN SATURATION: 99 % | DIASTOLIC BLOOD PRESSURE: 62 MMHG | HEART RATE: 78 BPM | RESPIRATION RATE: 16 BRPM

## 2025-02-25 VITALS
HEART RATE: 73 BPM | RESPIRATION RATE: 16 BRPM | SYSTOLIC BLOOD PRESSURE: 116 MMHG | DIASTOLIC BLOOD PRESSURE: 71 MMHG | OXYGEN SATURATION: 99 %

## 2025-02-25 PROCEDURE — 96413 CHEMO IV INFUSION 1 HR: CPT

## 2025-02-25 RX ORDER — BELIMUMAB 400 MG/5ML
400 INJECTION, POWDER, LYOPHILIZED, FOR SOLUTION INTRAVENOUS
Qty: 1 | Refills: 0 | Status: COMPLETED
Start: 2025-01-15

## 2025-02-28 ENCOUNTER — APPOINTMENT (OUTPATIENT)
Dept: UROLOGY | Facility: CLINIC | Age: 35
End: 2025-02-28
Payer: COMMERCIAL

## 2025-02-28 DIAGNOSIS — N52.9 MALE ERECTILE DYSFUNCTION, UNSPECIFIED: ICD-10-CM

## 2025-02-28 PROCEDURE — 99213 OFFICE O/P EST LOW 20 MIN: CPT | Mod: 95

## 2025-02-28 PROCEDURE — G2211 COMPLEX E/M VISIT ADD ON: CPT | Mod: 95

## 2025-03-15 ENCOUNTER — APPOINTMENT (OUTPATIENT)
Dept: RHEUMATOLOGY | Facility: CLINIC | Age: 35
End: 2025-03-15

## 2025-04-08 PROCEDURE — 99214 OFFICE O/P EST MOD 30 MIN: CPT | Mod: 95

## 2025-04-19 ENCOUNTER — APPOINTMENT (OUTPATIENT)
Dept: RHEUMATOLOGY | Facility: CLINIC | Age: 35
End: 2025-04-19

## 2025-04-19 ENCOUNTER — APPOINTMENT (OUTPATIENT)
Dept: RHEUMATOLOGY | Facility: CLINIC | Age: 35
End: 2025-04-19
Payer: COMMERCIAL

## 2025-04-19 VITALS
SYSTOLIC BLOOD PRESSURE: 110 MMHG | DIASTOLIC BLOOD PRESSURE: 71 MMHG | HEART RATE: 78 BPM | OXYGEN SATURATION: 97 % | RESPIRATION RATE: 16 BRPM | TEMPERATURE: 98.7 F

## 2025-04-19 VITALS — HEART RATE: 77 BPM | OXYGEN SATURATION: 100 % | DIASTOLIC BLOOD PRESSURE: 60 MMHG | SYSTOLIC BLOOD PRESSURE: 100 MMHG

## 2025-04-19 PROCEDURE — 96413 CHEMO IV INFUSION 1 HR: CPT

## 2025-04-19 RX ORDER — BELIMUMAB 400 MG/5ML
400 INJECTION, POWDER, LYOPHILIZED, FOR SOLUTION INTRAVENOUS
Qty: 1 | Refills: 0 | Status: COMPLETED
Start: 2025-01-15

## 2025-04-28 ENCOUNTER — APPOINTMENT (OUTPATIENT)
Dept: RHEUMATOLOGY | Facility: CLINIC | Age: 35
End: 2025-04-28
Payer: COMMERCIAL

## 2025-04-28 VITALS
DIASTOLIC BLOOD PRESSURE: 65 MMHG | BODY MASS INDEX: 26.07 KG/M2 | WEIGHT: 172 LBS | SYSTOLIC BLOOD PRESSURE: 93 MMHG | HEIGHT: 68 IN | OXYGEN SATURATION: 99 % | HEART RATE: 69 BPM

## 2025-04-28 DIAGNOSIS — M32.14 GLOMERULAR DISEASE IN SYSTEMIC LUPUS ERYTHEMATOSUS: ICD-10-CM

## 2025-04-28 DIAGNOSIS — Z79.60 LONG TERM (CURRENT) USE OF UNSPECIFIED IMMUNOMODULATORS AND IMMUNOSUPPRESSANTS: ICD-10-CM

## 2025-04-28 DIAGNOSIS — Z79.899 OTHER LONG TERM (CURRENT) DRUG THERAPY: ICD-10-CM

## 2025-04-28 DIAGNOSIS — Z79.01 LONG TERM (CURRENT) USE OF ANTICOAGULANTS: ICD-10-CM

## 2025-04-28 DIAGNOSIS — M32.9 SYSTEMIC LUPUS ERYTHEMATOSUS, UNSPECIFIED: ICD-10-CM

## 2025-04-28 PROCEDURE — 99214 OFFICE O/P EST MOD 30 MIN: CPT

## 2025-04-28 PROCEDURE — G2211 COMPLEX E/M VISIT ADD ON: CPT

## 2025-05-15 DIAGNOSIS — M32.9 SYSTEMIC LUPUS ERYTHEMATOSUS, UNSPECIFIED: ICD-10-CM

## 2025-05-17 ENCOUNTER — LABORATORY RESULT (OUTPATIENT)
Age: 35
End: 2025-05-17

## 2025-05-17 ENCOUNTER — APPOINTMENT (OUTPATIENT)
Dept: RHEUMATOLOGY | Facility: CLINIC | Age: 35
End: 2025-05-17
Payer: COMMERCIAL

## 2025-05-17 VITALS
HEART RATE: 84 BPM | TEMPERATURE: 97.3 F | OXYGEN SATURATION: 100 % | RESPIRATION RATE: 16 BRPM | DIASTOLIC BLOOD PRESSURE: 68 MMHG | SYSTOLIC BLOOD PRESSURE: 121 MMHG

## 2025-05-17 VITALS — DIASTOLIC BLOOD PRESSURE: 72 MMHG | HEART RATE: 66 BPM | SYSTOLIC BLOOD PRESSURE: 111 MMHG

## 2025-05-17 LAB
ALBUMIN SERPL ELPH-MCNC: 4.3 G/DL
ALP BLD-CCNC: 97 U/L
ALT SERPL-CCNC: 22 U/L
ANION GAP SERPL CALC-SCNC: 16 MMOL/L
APPEARANCE: CLEAR
AST SERPL-CCNC: 32 U/L
BACTERIA: NEGATIVE /HPF
BILIRUB SERPL-MCNC: 0.3 MG/DL
BILIRUBIN URINE: NEGATIVE
BLOOD URINE: NEGATIVE
BUN SERPL-MCNC: 17 MG/DL
CALCIUM SERPL-MCNC: 9.6 MG/DL
CAST: 0 /LPF
CHLORIDE SERPL-SCNC: 103 MMOL/L
CK SERPL-CCNC: 362 U/L
CO2 SERPL-SCNC: 22 MMOL/L
COLOR: YELLOW
CREAT SERPL-MCNC: 1.25 MG/DL
CREAT SPEC-SCNC: 184 MG/DL
CREAT/PROT UR: 0 RATIO
EGFRCR SERPLBLD CKD-EPI 2021: 78 ML/MIN/1.73M2
EPITHELIAL CELLS: 0 /HPF
GLUCOSE QUALITATIVE U: NEGATIVE MG/DL
KETONES URINE: NEGATIVE MG/DL
LEUKOCYTE ESTERASE URINE: NEGATIVE
MICROSCOPIC-UA: NORMAL
NITRITE URINE: NEGATIVE
PH URINE: 5.5
POTASSIUM SERPL-SCNC: 4 MMOL/L
PROT SERPL-MCNC: 6.8 G/DL
PROT UR-MCNC: 8 MG/DL
PROTEIN URINE: NEGATIVE MG/DL
RED BLOOD CELLS URINE: 1 /HPF
SODIUM SERPL-SCNC: 140 MMOL/L
SPECIFIC GRAVITY URINE: 1.02
UROBILINOGEN URINE: 0.2 MG/DL
WHITE BLOOD CELLS URINE: 2 /HPF

## 2025-05-17 PROCEDURE — 96413 CHEMO IV INFUSION 1 HR: CPT

## 2025-05-17 RX ORDER — BELIMUMAB 400 MG/5ML
400 INJECTION, POWDER, LYOPHILIZED, FOR SOLUTION INTRAVENOUS
Qty: 1 | Refills: 0 | Status: COMPLETED
Start: 2025-01-15

## 2025-05-18 LAB
BASOPHILS # BLD AUTO: 0.03 K/UL
BASOPHILS NFR BLD AUTO: 0.4 %
DSDNA AB SER-ACNC: 59 IU/ML
EOSINOPHIL # BLD AUTO: 0.11 K/UL
EOSINOPHIL NFR BLD AUTO: 1.6 %
HCT VFR BLD CALC: 42.7 %
HGB BLD-MCNC: 13.4 G/DL
IMM GRANULOCYTES NFR BLD AUTO: 0.1 %
LYMPHOCYTES # BLD AUTO: 1.6 K/UL
LYMPHOCYTES NFR BLD AUTO: 24 %
MAN DIFF?: NORMAL
MCHC RBC-ENTMCNC: 29.1 PG
MCHC RBC-ENTMCNC: 31.4 G/DL
MCV RBC AUTO: 92.6 FL
MONOCYTES # BLD AUTO: 0.63 K/UL
MONOCYTES NFR BLD AUTO: 9.4 %
NEUTROPHILS # BLD AUTO: 4.29 K/UL
NEUTROPHILS NFR BLD AUTO: 64.5 %
PLATELET # BLD AUTO: 204 K/UL
RBC # BLD: 4.61 M/UL
RBC # FLD: 13.2 %
WBC # FLD AUTO: 6.67 K/UL

## 2025-05-19 LAB
C3 SERPL-MCNC: 115 MG/DL
C4 SERPL-MCNC: 24 MG/DL

## 2025-06-16 ENCOUNTER — APPOINTMENT (OUTPATIENT)
Dept: RHEUMATOLOGY | Facility: CLINIC | Age: 35
End: 2025-06-16
Payer: COMMERCIAL

## 2025-06-16 VITALS — SYSTOLIC BLOOD PRESSURE: 94 MMHG | HEART RATE: 68 BPM | DIASTOLIC BLOOD PRESSURE: 73 MMHG | OXYGEN SATURATION: 98 %

## 2025-06-16 VITALS
TEMPERATURE: 97.6 F | DIASTOLIC BLOOD PRESSURE: 70 MMHG | OXYGEN SATURATION: 97 % | HEART RATE: 86 BPM | RESPIRATION RATE: 16 BRPM | SYSTOLIC BLOOD PRESSURE: 109 MMHG

## 2025-06-16 PROCEDURE — 96413 CHEMO IV INFUSION 1 HR: CPT

## 2025-06-16 RX ORDER — BELIMUMAB 400 MG/5ML
400 INJECTION, POWDER, LYOPHILIZED, FOR SOLUTION INTRAVENOUS
Qty: 1 | Refills: 0 | Status: COMPLETED
Start: 2025-01-15

## 2025-06-17 LAB
ALBUMIN SERPL ELPH-MCNC: 4.2 G/DL
ALP BLD-CCNC: 102 U/L
ALT SERPL-CCNC: 27 U/L
ANION GAP SERPL CALC-SCNC: 23 MMOL/L
APPEARANCE: CLEAR
AST SERPL-CCNC: 39 U/L
BACTERIA: NEGATIVE /HPF
BASOPHILS # BLD AUTO: 0.03 K/UL
BASOPHILS NFR BLD AUTO: 0.4 %
BILIRUB SERPL-MCNC: 0.4 MG/DL
BILIRUBIN URINE: NEGATIVE
BLOOD URINE: NEGATIVE
BUN SERPL-MCNC: 17 MG/DL
C3 SERPL-MCNC: 119 MG/DL
C4 SERPL-MCNC: 26 MG/DL
CALCIUM SERPL-MCNC: 9.7 MG/DL
CAST: 0 /LPF
CHLORIDE SERPL-SCNC: 100 MMOL/L
CK SERPL-CCNC: 538 U/L
CO2 SERPL-SCNC: 18 MMOL/L
COLOR: YELLOW
CREAT SERPL-MCNC: 1.18 MG/DL
CREAT SPEC-SCNC: 225 MG/DL
CREAT/PROT UR: 0.1 RATIO
EGFRCR SERPLBLD CKD-EPI 2021: 83 ML/MIN/1.73M2
EOSINOPHIL # BLD AUTO: 0.09 K/UL
EOSINOPHIL NFR BLD AUTO: 1.3 %
EPITHELIAL CELLS: 0 /HPF
GLUCOSE QUALITATIVE U: NEGATIVE MG/DL
HCT VFR BLD CALC: 43.5 %
HGB BLD-MCNC: 13.2 G/DL
IMM GRANULOCYTES NFR BLD AUTO: 0.1 %
KETONES URINE: NEGATIVE MG/DL
LEUKOCYTE ESTERASE URINE: NEGATIVE
LYMPHOCYTES # BLD AUTO: 1.34 K/UL
LYMPHOCYTES NFR BLD AUTO: 19.7 %
MAN DIFF?: NORMAL
MCHC RBC-ENTMCNC: 28.3 PG
MCHC RBC-ENTMCNC: 30.3 G/DL
MCV RBC AUTO: 93.3 FL
MICROSCOPIC-UA: NORMAL
MONOCYTES # BLD AUTO: 0.73 K/UL
MONOCYTES NFR BLD AUTO: 10.8 %
NEUTROPHILS # BLD AUTO: 4.59 K/UL
NEUTROPHILS NFR BLD AUTO: 67.7 %
NITRITE URINE: NEGATIVE
PH URINE: 6
PLATELET # BLD AUTO: 209 K/UL
POTASSIUM SERPL-SCNC: 4.4 MMOL/L
PROT SERPL-MCNC: 6.8 G/DL
PROT UR-MCNC: 15 MG/DL
PROTEIN URINE: 30 MG/DL
RBC # BLD: 4.66 M/UL
RBC # FLD: 13.4 %
RED BLOOD CELLS URINE: 1 /HPF
SODIUM SERPL-SCNC: 142 MMOL/L
SPECIFIC GRAVITY URINE: 1.03
UROBILINOGEN URINE: 0.2 MG/DL
WBC # FLD AUTO: 6.79 K/UL
WHITE BLOOD CELLS URINE: 0 /HPF

## 2025-06-18 LAB — DSDNA AB SER-ACNC: 53 IU/ML

## 2025-07-01 ENCOUNTER — NON-APPOINTMENT (OUTPATIENT)
Age: 35
End: 2025-07-01

## 2025-07-19 ENCOUNTER — APPOINTMENT (OUTPATIENT)
Dept: RHEUMATOLOGY | Facility: CLINIC | Age: 35
End: 2025-07-19

## 2025-07-28 ENCOUNTER — APPOINTMENT (OUTPATIENT)
Dept: RHEUMATOLOGY | Facility: CLINIC | Age: 35
End: 2025-07-28
Payer: COMMERCIAL

## 2025-07-28 VITALS
OXYGEN SATURATION: 97 % | RESPIRATION RATE: 16 BRPM | DIASTOLIC BLOOD PRESSURE: 69 MMHG | TEMPERATURE: 98 F | HEART RATE: 70 BPM | SYSTOLIC BLOOD PRESSURE: 119 MMHG

## 2025-07-28 VITALS — HEART RATE: 71 BPM | SYSTOLIC BLOOD PRESSURE: 111 MMHG | DIASTOLIC BLOOD PRESSURE: 71 MMHG

## 2025-07-28 PROCEDURE — 96413 CHEMO IV INFUSION 1 HR: CPT

## 2025-07-28 RX ORDER — BELIMUMAB 400 MG/5ML
400 INJECTION, POWDER, LYOPHILIZED, FOR SOLUTION INTRAVENOUS
Qty: 1 | Refills: 0 | Status: COMPLETED
Start: 2025-01-15

## 2025-08-18 ENCOUNTER — APPOINTMENT (OUTPATIENT)
Dept: RHEUMATOLOGY | Facility: CLINIC | Age: 35
End: 2025-08-18

## 2025-08-25 ENCOUNTER — APPOINTMENT (OUTPATIENT)
Dept: RHEUMATOLOGY | Facility: CLINIC | Age: 35
End: 2025-08-25
Payer: COMMERCIAL

## 2025-08-25 ENCOUNTER — LABORATORY RESULT (OUTPATIENT)
Age: 35
End: 2025-08-25

## 2025-08-25 VITALS
DIASTOLIC BLOOD PRESSURE: 67 MMHG | OXYGEN SATURATION: 98 % | HEART RATE: 68 BPM | RESPIRATION RATE: 16 BRPM | SYSTOLIC BLOOD PRESSURE: 107 MMHG

## 2025-08-25 VITALS
DIASTOLIC BLOOD PRESSURE: 67 MMHG | HEART RATE: 73 BPM | TEMPERATURE: 97.9 F | OXYGEN SATURATION: 97 % | SYSTOLIC BLOOD PRESSURE: 103 MMHG | RESPIRATION RATE: 16 BRPM

## 2025-08-25 PROCEDURE — 96413 CHEMO IV INFUSION 1 HR: CPT

## 2025-08-25 RX ORDER — BELIMUMAB 400 MG/5ML
400 INJECTION, POWDER, LYOPHILIZED, FOR SOLUTION INTRAVENOUS
Qty: 1 | Refills: 0 | Status: COMPLETED
Start: 2025-01-15

## 2025-08-26 LAB
ALBUMIN SERPL ELPH-MCNC: 4.4 G/DL
ALP BLD-CCNC: 98 U/L
ALT SERPL-CCNC: 25 U/L
ANION GAP SERPL CALC-SCNC: 19 MMOL/L
APPEARANCE: CLEAR
AST SERPL-CCNC: 35 U/L
BACTERIA: NEGATIVE /HPF
BASOPHILS # BLD AUTO: 0.02 K/UL
BASOPHILS NFR BLD AUTO: 0.3 %
BILIRUB SERPL-MCNC: 0.5 MG/DL
BILIRUBIN URINE: NEGATIVE
BLOOD URINE: NEGATIVE
BUN SERPL-MCNC: 17 MG/DL
C3 SERPL-MCNC: 127 MG/DL
C4 SERPL-MCNC: 24 MG/DL
CALCIUM SERPL-MCNC: 9.6 MG/DL
CAST: 0 /LPF
CHLORIDE SERPL-SCNC: 103 MMOL/L
CK SERPL-CCNC: 398 U/L
CO2 SERPL-SCNC: 22 MMOL/L
COLOR: YELLOW
CREAT SERPL-MCNC: 1.24 MG/DL
CREAT SPEC-SCNC: 216 MG/DL
CREAT/PROT UR: 0 RATIO
DSDNA AB SER-ACNC: 55 IU/ML
EGFRCR SERPLBLD CKD-EPI 2021: 78 ML/MIN/1.73M2
EOSINOPHIL # BLD AUTO: 0.08 K/UL
EOSINOPHIL NFR BLD AUTO: 1.4 %
EPITHELIAL CELLS: 0 /HPF
GLUCOSE QUALITATIVE U: NEGATIVE MG/DL
HCT VFR BLD CALC: 42.2 %
HGB BLD-MCNC: 13 G/DL
IMM GRANULOCYTES NFR BLD AUTO: 0.2 %
KETONES URINE: NEGATIVE MG/DL
LEUKOCYTE ESTERASE URINE: NEGATIVE
LYMPHOCYTES # BLD AUTO: 1.48 K/UL
LYMPHOCYTES NFR BLD AUTO: 25.6 %
MAN DIFF?: NORMAL
MCHC RBC-ENTMCNC: 28.4 PG
MCHC RBC-ENTMCNC: 30.8 G/DL
MCV RBC AUTO: 92.3 FL
MICROSCOPIC-UA: NORMAL
MONOCYTES # BLD AUTO: 0.59 K/UL
MONOCYTES NFR BLD AUTO: 10.2 %
NEUTROPHILS # BLD AUTO: 3.61 K/UL
NEUTROPHILS NFR BLD AUTO: 62.3 %
NITRITE URINE: NEGATIVE
PH URINE: 5.5
PLATELET # BLD AUTO: 196 K/UL
POTASSIUM SERPL-SCNC: 4 MMOL/L
PROT SERPL-MCNC: 6.8 G/DL
PROT UR-MCNC: 9 MG/DL
PROTEIN URINE: NORMAL MG/DL
RBC # BLD: 4.57 M/UL
RBC # FLD: 13 %
RED BLOOD CELLS URINE: 1 /HPF
SODIUM SERPL-SCNC: 144 MMOL/L
SPECIFIC GRAVITY URINE: 1.03
UROBILINOGEN URINE: 0.2 MG/DL
WBC # FLD AUTO: 5.79 K/UL
WHITE BLOOD CELLS URINE: 0 /HPF

## 2025-08-26 RX ORDER — BELIMUMAB 400 MG/5ML
400 INJECTION, POWDER, LYOPHILIZED, FOR SOLUTION INTRAVENOUS
Qty: 1 | Refills: 0 | Status: ACTIVE | OUTPATIENT
Start: 2025-08-26

## 2025-09-23 LAB
C3 SERPL-MCNC: 111 MG/DL
C4 SERPL-MCNC: 23 MG/DL
DSDNA AB SER-ACNC: 47 IU/ML

## 2025-09-25 LAB
ALBUMIN SERPL ELPH-MCNC: 4.1 G/DL
ALP BLD-CCNC: 93 U/L
ALT SERPL-CCNC: 24 U/L
ANION GAP SERPL CALC-SCNC: 25 MMOL/L
APPEARANCE: CLEAR
AST SERPL-CCNC: 39 U/L
BACTERIA: NEGATIVE /HPF
BASOPHILS # BLD AUTO: 0.02 K/UL
BASOPHILS NFR BLD AUTO: 0.3 %
BILIRUB SERPL-MCNC: 0.4 MG/DL
BILIRUBIN URINE: NEGATIVE
BLOOD URINE: NEGATIVE
BUN SERPL-MCNC: 11 MG/DL
CALCIUM SERPL-MCNC: 9.4 MG/DL
CAST: 0 /LPF
CHLORIDE SERPL-SCNC: 100 MMOL/L
CK SERPL-CCNC: 428 U/L
CO2 SERPL-SCNC: 17 MMOL/L
COLOR: YELLOW
CREAT SERPL-MCNC: 1.08 MG/DL
CREAT SPEC-SCNC: 181 MG/DL
CREAT/PROT UR: 0.1 RATIO
EGFRCR SERPLBLD CKD-EPI 2021: 92 ML/MIN/1.73M2
EOSINOPHIL # BLD AUTO: 0.09 K/UL
EOSINOPHIL NFR BLD AUTO: 1.3 %
EPITHELIAL CELLS: 0 /HPF
GLUCOSE QUALITATIVE U: NEGATIVE MG/DL
HCT VFR BLD CALC: 43.2 %
HGB BLD-MCNC: 13.2 G/DL
IMM GRANULOCYTES NFR BLD AUTO: 0.1 %
KETONES URINE: NEGATIVE MG/DL
LEUKOCYTE ESTERASE URINE: NEGATIVE
LYMPHOCYTES # BLD AUTO: 1.34 K/UL
LYMPHOCYTES NFR BLD AUTO: 19.8 %
MAN DIFF?: NORMAL
MCHC RBC-ENTMCNC: 29.1 PG
MCHC RBC-ENTMCNC: 30.6 G/DL
MCV RBC AUTO: 95.2 FL
MICROSCOPIC-UA: NORMAL
MONOCYTES # BLD AUTO: 0.59 K/UL
MONOCYTES NFR BLD AUTO: 8.7 %
NEUTROPHILS # BLD AUTO: 4.73 K/UL
NEUTROPHILS NFR BLD AUTO: 69.8 %
NITRITE URINE: NEGATIVE
PH URINE: 6
PLATELET # BLD AUTO: 201 K/UL
POTASSIUM SERPL-SCNC: 3.8 MMOL/L
PROT SERPL-MCNC: 6.5 G/DL
PROT UR-MCNC: 10 MG/DL
PROTEIN URINE: NORMAL MG/DL
RBC # BLD: 4.54 M/UL
RBC # FLD: 13.7 %
RED BLOOD CELLS URINE: 1 /HPF
SODIUM SERPL-SCNC: 142 MMOL/L
SPECIFIC GRAVITY URINE: 1.02
UROBILINOGEN URINE: 0.2 MG/DL
WBC # FLD AUTO: 6.78 K/UL
WHITE BLOOD CELLS URINE: 0 /HPF

## (undated) DEVICE — GOWN TRIMAX LG

## (undated) DEVICE — DRSG VAC GRANUFOAM MEDIUM (BLACK)

## (undated) DEVICE — BLADE SCALPEL SAFETYLOCK #15

## (undated) DEVICE — GLV 7 PROTEXIS (WHITE)

## (undated) DEVICE — DRSG XEROFORM 5 X 9"

## (undated) DEVICE — FOLEY TRAY 16FR 5CC LTX UMETER CLOSED

## (undated) DEVICE — SOL IRR POUR H2O 250ML

## (undated) DEVICE — WARMING BLANKET UPPER ADULT

## (undated) DEVICE — MEDICATION LABELS W MARKER

## (undated) DEVICE — DRSG XEROFORM 1 X 8"

## (undated) DEVICE — SOL IRR BAG NS 0.9% 3000ML

## (undated) DEVICE — VISITEC 4X4

## (undated) DEVICE — SOL IRR POUR NS 0.9% 500ML

## (undated) DEVICE — CANISTER W/GEL INFOVAC 500ML

## (undated) DEVICE — DRAIN JACKSON PRATT 7MM FLAT FULL NO TROCAR

## (undated) DEVICE — POSITIONER FOAM EGG CRATE ULNAR 2PCS (PINK)

## (undated) DEVICE — DRAPE MAYO STAND 30"

## (undated) DEVICE — PACK MAJOR ABDOMINAL SUPINE

## (undated) DEVICE — IMMOBILIZER HAND ALUMINUM LARGE

## (undated) DEVICE — DRAPE 1/2 SHEET 40X57"

## (undated) DEVICE — DRAIN JACKSON PRATT 10MM FLAT FULL NO TROCAR

## (undated) DEVICE — DRAIN RESERVOIR FOR JACKSON PRATT 100CC CARDINAL

## (undated) DEVICE — DRSG VAC GRANUFOAM LARGE (BLACK)

## (undated) DEVICE — STRYKER INTERPULSE HANDPIECE W IRR SUCTION TUBE

## (undated) DEVICE — SPECIMEN CONTAINER 100ML

## (undated) DEVICE — STAPLER SKIN VISI-STAT 35 WIDE

## (undated) DEVICE — DRAPE INSTRUMENT POUCH 6.75" X 11"

## (undated) DEVICE — TUBING SUCTION 20FT

## (undated) DEVICE — WARMING BLANKET LOWER ADULT

## (undated) DEVICE — LAP PAD 18 X 18"

## (undated) DEVICE — VENODYNE/SCD SLEEVE CALF LARGE

## (undated) DEVICE — CANISTER KCI 500ML GEL SENSA TRAC

## (undated) DEVICE — CANISTER W/GEL INFOVAC 1000ML

## (undated) DEVICE — DRAPE TOWEL BLUE 17" X 24"

## (undated) DEVICE — GLV 7.5 PROTEXIS (WHITE)

## (undated) DEVICE — DRAPE LIGHT HANDLE COVER (BLUE)

## (undated) DEVICE — GLV 8.5 PROTEXIS (WHITE)

## (undated) DEVICE — GLV 6.5 PROTEXIS (WHITE)

## (undated) DEVICE — GLV 8 PROTEXIS (WHITE)

## (undated) DEVICE — DRSG VAC GRANUFOAM SMALL (BLACK)

## (undated) DEVICE — DRAPE 3/4 SHEET W REINFORCEMENT 56X77"

## (undated) DEVICE — BLADE SCALPEL SAFETYLOCK #10